# Patient Record
Sex: MALE | Race: WHITE | NOT HISPANIC OR LATINO | Employment: PART TIME | URBAN - METROPOLITAN AREA
[De-identification: names, ages, dates, MRNs, and addresses within clinical notes are randomized per-mention and may not be internally consistent; named-entity substitution may affect disease eponyms.]

---

## 2017-06-22 ENCOUNTER — ALLSCRIPTS OFFICE VISIT (OUTPATIENT)
Dept: OTHER | Facility: OTHER | Age: 67
End: 2017-06-22

## 2017-09-11 ENCOUNTER — TRANSCRIBE ORDERS (OUTPATIENT)
Dept: ADMINISTRATIVE | Facility: HOSPITAL | Age: 67
End: 2017-09-11

## 2017-09-11 DIAGNOSIS — R01.1 HEART MURMUR: Primary | ICD-10-CM

## 2017-09-25 ENCOUNTER — HOSPITAL ENCOUNTER (OUTPATIENT)
Dept: NON INVASIVE DIAGNOSTICS | Facility: CLINIC | Age: 67
Discharge: HOME | End: 2017-09-25
Payer: MEDICARE

## 2017-09-25 ENCOUNTER — GENERIC CONVERSION - ENCOUNTER (OUTPATIENT)
Dept: OTHER | Facility: OTHER | Age: 67
End: 2017-09-25

## 2017-09-25 DIAGNOSIS — R01.1 HEART MURMUR: ICD-10-CM

## 2017-09-25 PROCEDURE — 93306 TTE W/DOPPLER COMPLETE: CPT

## 2017-11-14 ENCOUNTER — ALLSCRIPTS OFFICE VISIT (OUTPATIENT)
Dept: OTHER | Facility: OTHER | Age: 67
End: 2017-11-14

## 2017-11-15 NOTE — CONSULTS
Assessment  1  Elevated PSA (790 93) (R97 20)    Plan  Elevated PSA    · Prostate Needle Biopsy - POC; Status:Active; Requested for:[id number];    Perform: In Office; YCS:[id number]; Ordered;PSA; Ordered By:Luis Eduardo Krueger;    Discussion/Summary  Discussion Summary:   Given the persistence of a PSA elevation of greater than 12 in conjunction with a suspicious area in the right peripheral zone on MRI, I have advised a transrectal biopsies be performed at this time  Risks including bleeding and infection discussed with patient and wife  All questions answered at this time  Counseling Documentation With Imm: The patient was counseled regarding  Self Referrals:   Self Referrals: Yes Thru friends      Chief Complaint  Chief Complaint Free Text Note Form: patient here for elevated PSA      History of Present Illness  HPI: At S patient is a pleasant gentleman seen in regards to a persistent elevated PSA of 12 2  PSA in 2013 was 2 3  Denies any recent systemic illness  No known family history of prostate cancer  He offers no urinary complaints other than enough air flow with otherwise complete bladder emptying sensation  Denies prior prostatitis, UTI, or hematuria  His primary physician had sent him for an MRI of the prostate which also confirms the presence of a suspicious area in the right peripheral zone  Review of Systems  Complete-Male Urology:  Constitutional: No fever or chills, feels well, no tiredness, no recent weight gain or weight loss  Respiratory: No complaints of shortness of breath, no wheezing, no cough, no SOB on exertion, no orthopnea or PND  Cardiovascular: No complaints of slow heart rate, no fast heart rate, no chest pain, no palpitations, no leg claudication, no lower extremity  Gastrointestinal: No complaints of abdominal pain, no constipation, no nausea or vomiting, no diarrhea or bloody stools    Genitourinary: Empty sensation-- and-- stream quality fair, but-- as noted in HPI,-- no dysuria,-- no urinary hesitancy,-- no hematuria,-- no incontinence,-- no nocturia-- and-- no feelings of urinary urgency  Musculoskeletal: No complaints of arthralgia, no myalgias, no joint swelling or stiffness, no limb pain or swelling  Integumentary: No complaints of skin rash or skin lesions, no itching, no skin wound, no dry skin  Hematologic/Lymphatic: No complaints of swollen glands, no swollen glands in the neck, does not bleed easily, no easy bruising  Neurological: No compliants of headache, no confusion, no convulsions, no numbness or tingling, no dizziness or fainting, no limb weakness, no difficulty walking  Active Problems    1  Elevated PSA (790 93) (R97 20)   2  Hypercholesterolemia (272 0) (E78 00)   3  Hypertension (401 9) (I10)   4  Localization-related symptomatic epilepsy and epileptic syndromes with complex partial seizures, not intractable, without status epilepticus (345 40) (G40 209)   5  Osteopenia (733 90) (M85 80)    Past Medical History  1  History of basal cell carcinoma (V10 83) (W87 256)  Active Problems And Past Medical History Reviewed: The active problems and past medical history were reviewed and updated today  Surgical History  1  History of Inguinal Hernia Repair  Surgical History Reviewed: The surgical history was reviewed and updated today  Family History  Father    1  Family history of   Family History Reviewed: The family history was reviewed and updated today  Social History     · Caffeine use (V49 89) (F15 90)   ·    · Never A Smoker   · Social alcohol use (Z78 9)  Social History Reviewed: The social history was reviewed and updated today  Current Meds   1  Aspirin Low Dose 81 MG TABS; TAKE 1 TABLET DAILY; Therapy: 62RUG2086 to (Evaluate:34Num5177); Last Rx:15Kgd5491 Ordered   2  Calcium + D TABS; TAKE 1 TABLET DAILY ( 1200mg); Therapy: (Recorded:93Wki5324) to Recorded   3   Fexofenadine HCl - 180 MG Oral Tablet; TAKE 1 TABLET DAILY; Therapy: (Recorded:11Kbp0761) to Recorded   4  LevETIRAcetam 1000 MG Oral Tablet; TAKE 1 TABLET TWICE DAILY; Therapy: 48JEW2967 to (Evaluate:17Jun2018)  Requested for: 46WPT2383; Last Rx:22Jun2017 Ordered   5  Lisinopril 5 MG Oral Tablet; TAKE 1 TABLET DAILY; Therapy: (Recorded:18Ndw3959) to Recorded   6  Multivitamins Oral Capsule; TAKE 1 CAPSULE DAILY; Therapy: (Recorded:94Rfj9852) to Recorded   7  Simvastatin 40 MG Oral Tablet; TAKE 1 TABLET AT BEDTIME; Therapy: 20TUW2126 to (Evaluate:24Jun2016) Recorded  Medication List Reviewed: The medication list was reviewed and updated today  Allergies  1  No Known Drug Allergies    Vitals  Vital Signs    Recorded: 67UHS1243 08:26AM   Heart Rate 78   Systolic 182   Diastolic 78   Height 6 ft    Weight 191 lb    BMI Calculated 25 9   BSA Calculated 2 09       Physical Exam   Constitutional  General appearance: No acute distress, well appearing and well nourished  Pulmonary  Respiratory effort: No increased work of breathing or signs of respiratory distress  Auscultation of lungs: Clear to auscultation  Cardiovascular  Auscultation of heart: Normal rate and rhythm, normal S1 and S2, without murmurs  Examination of extremities for edema and/or varicosities: Normal    Abdomen  Abdomen: Non-tender, no masses  Genitourinary  Scrotum contents: Normal size, no masses  Epididymis: Normal, no masses  Testes: Normal testes, no masses  Urethral meatus: Normal, no lesions  Penis: Normal, no lesions  Digital rectal exam of prostate: Normal size, no masses  -- Approx 40 gram  No nodule appreciated  Anus, perineum, and rectum: Normal    Musculoskeletal  Gait and station: Normal    Digits and nails: Normal without clubbing or cyanosis  Inspection/palpation of joints, bones, and muscles: Normal    Skin  Skin and subcutaneous tissue: Normal without rashes or lesions     Lymphatic  Palpation of lymph nodes in groin: Normal    Neurologic  Cranial nerves: Cranial nerves 2-12 intact         Signatures   Electronically signed by : ANGI Card ; Nov 14 2017  8:49AM EST                       (Author)

## 2018-01-12 VITALS
BODY MASS INDEX: 25.87 KG/M2 | HEART RATE: 78 BPM | HEIGHT: 72 IN | SYSTOLIC BLOOD PRESSURE: 160 MMHG | WEIGHT: 191 LBS | DIASTOLIC BLOOD PRESSURE: 78 MMHG

## 2018-01-12 VITALS
HEART RATE: 78 BPM | OXYGEN SATURATION: 97 % | HEIGHT: 72 IN | WEIGHT: 191.06 LBS | SYSTOLIC BLOOD PRESSURE: 118 MMHG | BODY MASS INDEX: 25.88 KG/M2 | DIASTOLIC BLOOD PRESSURE: 70 MMHG

## 2018-01-30 ENCOUNTER — TELEPHONE (OUTPATIENT)
Dept: UROLOGY | Facility: AMBULATORY SURGERY CENTER | Age: 68
End: 2018-01-30

## 2018-01-30 PROBLEM — R97.20 ELEVATED PSA: Status: ACTIVE | Noted: 2017-11-14

## 2018-01-30 NOTE — TELEPHONE ENCOUNTER
Patient called office with questions about biopsy prep for his procedure on 2/1/18  Patient states he has already stopped his ASA and vitamins as instructed  He has the antibiotic to start on the morning prior to the biopsy and has the fleet enema to do 2-3 hrs prior to procedure  He asked about eating a decent breakfast since his biopsy is at 12:30  Advised patient he can eat a regular breakfast and all other meds (besides the ASA and vitamins) can be taken on the morning of the biopsy  Patient verbalized understanding and agrees to plan

## 2018-01-30 NOTE — PROGRESS NOTES
Biopsy prostate     Date/Time 1/30/2018 8:30 AM     Performed by  Chaim Watson by YESSY Chavez       Consent: Verbal consent obtained  Written consent obtained  Risks and benefits: risks, benefits and alternatives were discussed  Consent given by: patient  Patient understanding: patient states understanding of the procedure being performed  Patient consent: the patient's understanding of the procedure matches consent given  Procedure consent: procedure consent matches procedure scheduled  Patient identity confirmed: verbally with patient      Local anesthesia used: yes     Anesthesia   Local anesthesia used: yes  Local Anesthetic: lidocaine 1% without epinephrine     Sedation   Patient sedated: no      Patient tolerance: Patient tolerated the procedure well with no immediate complications      Patient Transportation: confirmed         Patient is a pleasant gentleman seen in regards to a persistent elevated PSA of 12 2  PSA in 2013 was 2 3  Denies any recent systemic illness  No known family history of prostate cancer  He offers no urinary complaints other than enough air flow with otherwise complete bladder emptying sensation  Denies prior prostatitis, UTI, or hematuria  His primary physician had sent him for an MRI of the prostate which also confirms the presence of a suspicious area in the right peripheral zone  PROCEDURE- US GUIDED PROSTATE BIOPSY    After identification and obtaining informed consent the patient was placed in the left lateral decubitus position in the ultrasound room  He was prepped in the usual fashion  10 cc of 1% viscous lidocaine was administered per rectum  The 7 5 MHz transrectal probe was then introduced  A periprosthetic nerve block was provided by instilling 10 cc of 1% lidocaine via spinal needle into the periprosthetic space bilaterally  Serial images of the prostate were then obtained   Once completed biopsies were retrieved 4 from each peripheral zone and 2 from each central zone for a total of 12 cores  Patient tolerated this well and will follow up in one to 2 weeks for pathology results  PERTINENT FINDINGS AND PLAN    Prostate volume was 60 5 cc  Scattered calcifications were seen as well as a hypoechoic region within the right peripheral zone  Samples of this region in particular were obtained  Patient tolerated the procedure well  Post biopsy instructions provided  He will return in 2 weeks to review results

## 2018-02-01 ENCOUNTER — PROCEDURE VISIT (OUTPATIENT)
Dept: UROLOGY | Facility: AMBULATORY SURGERY CENTER | Age: 68
End: 2018-02-01
Payer: MEDICARE

## 2018-02-01 VITALS
SYSTOLIC BLOOD PRESSURE: 130 MMHG | BODY MASS INDEX: 25.39 KG/M2 | HEART RATE: 68 BPM | DIASTOLIC BLOOD PRESSURE: 82 MMHG | HEIGHT: 73 IN | WEIGHT: 191.6 LBS

## 2018-02-01 DIAGNOSIS — R97.20 ELEVATED PSA: Primary | ICD-10-CM

## 2018-02-01 PROCEDURE — G0416 PROSTATE BIOPSY, ANY MTHD: HCPCS | Performed by: PATHOLOGY

## 2018-02-01 PROCEDURE — 88344 IMHCHEM/IMCYTCHM EA MLT ANTB: CPT | Performed by: PATHOLOGY

## 2018-02-01 PROCEDURE — G0416 PROSTATE BIOPSY, ANY MTHD: HCPCS | Performed by: UROLOGY

## 2018-02-01 PROCEDURE — 88344 IMHCHEM/IMCYTCHM EA MLT ANTB: CPT | Performed by: UROLOGY

## 2018-02-01 PROCEDURE — 55700 PR BIOPSY OF PROSTATE,NEEDLE/PUNCH: CPT | Performed by: UROLOGY

## 2018-02-01 PROCEDURE — 76872 US TRANSRECTAL: CPT | Performed by: UROLOGY

## 2018-02-01 PROCEDURE — 76942 ECHO GUIDE FOR BIOPSY: CPT | Performed by: UROLOGY

## 2018-02-01 RX ORDER — LANOLIN ALCOHOL/MO/W.PET/CERES
CREAM (GRAM) TOPICAL 2 TIMES DAILY
COMMUNITY

## 2018-02-01 RX ORDER — LISINOPRIL 5 MG/1
1 TABLET ORAL DAILY
COMMUNITY

## 2018-02-01 RX ORDER — CHOLECALCIFEROL (VITAMIN D3) 125 MCG
5 CAPSULE ORAL
COMMUNITY
Start: 2017-03-06

## 2018-02-01 RX ORDER — FEXOFENADINE HCL 180 MG/1
1 TABLET ORAL DAILY
COMMUNITY

## 2018-02-01 RX ORDER — CLOTRIMAZOLE AND BETAMETHASONE DIPROPIONATE 10; .64 MG/G; MG/G
1 CREAM TOPICAL
COMMUNITY
Start: 2017-10-23 | End: 2018-05-16

## 2018-02-01 RX ORDER — SIMVASTATIN 40 MG
1 TABLET ORAL
COMMUNITY
Start: 2016-06-23

## 2018-02-01 RX ORDER — CIPROFLOXACIN 500 MG/1
1 TABLET, FILM COATED ORAL DAILY
COMMUNITY
Start: 2017-12-07 | End: 2018-05-16

## 2018-02-01 RX ORDER — LEVETIRACETAM 1000 MG/1
1 TABLET ORAL 2 TIMES DAILY
COMMUNITY
Start: 2014-12-11

## 2018-02-01 NOTE — PATIENT INSTRUCTIONS
Prostate Biopsy   WHAT YOU NEED TO KNOW:   A prostate biopsy is a procedure to remove samples of tissue from your prostate gland  The prostate is a male sex gland that makes fluid found in semen  It is located just below the bladder  After the samples are removed, they are sent to a lab and tested for cancer  DISCHARGE INSTRUCTIONS:   Seek care immediately if:   · You have heavy bleeding from your rectum  · You urinate very little or not at all  · You have pain from your procedure that gets worse, even after you take pain medicine  Contact your healthcare provider if:   · You have a fever or chills  · You feel pain or burning when you urinate  · Your urine is cloudy or smells bad  · You have questions or concerns about your condition or care  Medicines:  · Medicines  can help decrease pain  You may need medicine to prevent or treat a bacterial infection  Ask how to take pain medicine safely  · Take your medicine as directed  Contact your healthcare provider if you think your medicine is not helping or if you have side effects  Tell him or her if you are allergic to any medicine  Keep a list of the medicines, vitamins, and herbs you take  Include the amounts, and when and why you take them  Bring the list or the pill bottles to follow-up visits  Carry your medicine list with you in case of an emergency  Follow up with your healthcare provider or urologist as directed: You may need to return for more tests or procedures  Write down your questions so you remember to ask them during your visits  © 2017 [employer] Information is for End User's use only and may not be sold, redistributed or otherwise used for commercial purposes  All illustrations and images included in CareNotes® are the copyrighted property of [employer]  or [employer]  The above information is an  only   It is not intended as medical advice for individual conditions or treatments  Talk to your doctor, nurse or pharmacist before following any medical regimen to see if it is safe and effective for you

## 2018-02-09 NOTE — PROGRESS NOTES
2/13/2018    Edmundo Smart  1950  [id number]    Discussion and Plan    Patient is seen for discussion of pathology results revealing adenocarcinoma of the prostate  At this time I spent approximately 45 minutes discussing both a diagnosis of prostate cancer and treatment options  Specific treatments such as active surveillance, brachytherapy, external beam radiation therapy, and radical prostatectomy including open and robotic techniques were reviewed  Risks, benefits, and long-term disease outcomes were explained  Literature was provided and the patient will be again seen in the next one to 2 weeks to finalize treatment planning  He is strongly considering DaVinci prostatectomy  Operative risks and benefits briefly reviewed today  Staging CT and pelvis will be obtained prior to next visit  1  Prostate cancer ([facility] [address] )  - CT abdomen pelvis w wo contrast; Future      Assessment      Patient Active Problem List   Diagnosis    Elevated PSA    Prostate cancer ([facility] [address] )       History of Present Illness    Ingris Dennis is a 79 y o  male seen today in regards to a history of persistently elevated PSA of 12 2  PSA in 2013 was 2 3  Denies any recent systemic illness  No known family history of prostate cancer  He offers no urinary complaints other than enough air flow with otherwise complete bladder emptying sensation  Denies prior prostatitis, UTI, or hematuria  His primary physician had sent him for an MRI of the prostate which also confirms the presence of a suspicious area in the right peripheral zone  He recently underwent prostate biopsies      Urinary Symptom Assessment        Past Medical History  Past Medical History:   Diagnosis Date    Basal cell carcinoma        Past Social History  Past Surgical History:   Procedure Laterality Date    INGUINAL HERNIA REPAIR      PROSTATE BIOPSY  02/01/2018    Dr Wandy Joel        Past Family History  Family History   Problem Relation Age of Onset    No Known Problems Mother  Cancer Brother        Past Social history  Social History     Social History    Marital status: /Civil Union     Spouse name: N/A    Number of children: N/A    Years of education: N/A     Occupational History    Not on file  Social History Main Topics    Smoking status: Never Smoker    Smokeless tobacco: Never Used    Alcohol use Yes      Comment: social     Drug use: No    Sexual activity: Not on file     Other Topics Concern    Not on file     Social History Narrative    No narrative on file       Current Medications  Current Outpatient Prescriptions   Medication Sig Dispense Refill    aspirin (ASPIRIN LOW DOSE) 81 MG tablet Take 1 tablet by mouth daily      calcium citrate-vitamin D (CITRACAL+D) 315-200 MG-UNIT per tablet Take by mouth      clotrimazole-betamethasone (LOTRISONE) 1-0 05 % cream Apply 1 application topically      fexofenadine (ALLEGRA) 180 MG tablet Take 1 tablet by mouth daily      levETIRAcetam (KEPPRA) 1000 MG tablet Take 1 tablet by mouth 2 (two) times a day      lisinopril (ZESTRIL) 5 mg tablet Take 1 tablet by mouth daily      Melatonin 5 MG TABS Take 5 mg by mouth      Pediatric Multivitamins-Fl (MULTIVITAMINS/FL PO) Take 1 capsule by mouth daily      simvastatin (ZOCOR) 40 mg tablet Take 1 tablet by mouth      ciprofloxacin (CIPRO) 500 mg tablet Take 1 tablet by mouth daily       No current facility-administered medications for this visit  Allergies  Allergies   Allergen Reactions    Molds & Smuts      Other reaction(s): Respiratory Distress  Congestion, sore throat, coughing  Past Medical History, Social History, Family History, medications and allergies were reviewed  Review of Systems  Review of Systems   Constitutional: Negative  HENT: Negative  Eyes: Negative  Respiratory: Negative  Cardiovascular: Negative  Gastrointestinal: Negative  Endocrine: Negative      Genitourinary: Negative for difficulty urinating and hematuria  Musculoskeletal: Negative  Skin: Negative  Neurological: Negative  Hematological: Negative  Psychiatric/Behavioral: Negative  Vitals  Vitals:    02/13/18 1038   BP: 128/80   Pulse: 72         Physical Exam    Physical Exam   Constitutional: He is oriented to person, place, and time  He appears well-developed and well-nourished  HENT:   Head: Normocephalic and atraumatic  Eyes: Pupils are equal, round, and reactive to light  Neck: Normal range of motion  Cardiovascular: Normal rate, regular rhythm and normal heart sounds  Pulmonary/Chest: Effort normal and breath sounds normal  No accessory muscle usage  No respiratory distress  Abdominal: Soft  Normal appearance and bowel sounds are normal  There is no tenderness  Genitourinary: Rectum normal and penis normal  No penile tenderness  Genitourinary Comments: Right-sided prostate nodule  Musculoskeletal: Normal range of motion  Neurological: He is alert and oriented to person, place, and time  Skin: Skin is warm, dry and intact  Psychiatric: He has a normal mood and affect  His speech is normal  Cognition and memory are normal    Nursing note and vitals reviewed        Results    No results found for: PSA  Lab Results   Component Value Date    GLUCOSE 96 02/19/2015    CALCIUM 8 7 02/19/2015     02/19/2015    K 4 0 02/19/2015    CO2 29 02/19/2015     02/19/2015    BUN 16 02/19/2015    CREATININE 0 84 02/19/2015     Lab Results   Component Value Date    WBC 3 79 (L) 02/19/2015    HGB 14 6 02/19/2015    HCT 42 7 02/19/2015    MCV 92 02/19/2015     02/19/2015       No results found for this or any previous visit (from the past 1 hour(s)) ]    Case Report   Surgical Pathology Report                         Case: [id number]                                    Authorizing Provider: Lopez Polk MD             Collected:           02/01/2018 1300               Ordering Location:     [address] [place] [address]    Received:            02/01/2018 [facility]                                      [facility]                                                             Pathologist:           Yazmin Foss MD                                                           Specimens:   A) - Prostate, JUAN DANIEL                                                                                   B) - Prostate, LCZ                                                                                   C) - Prostate, RCZ                                                                                   D) - Prostate, RPZ                                                                         Final Diagnosis   A  Prostate, JUAN DANIEL, core needle biopsies:             - Benign prostate glands  - No malignancy is identified, supported on a prostate triple stain, performed on slide A2 with an appropriate control      B  Prostate, LCZ, core needle biopsy:             - Rare atypical prostate glands (less than 5% of the core biopsy), suspicious for prostatic adenocarcinoma, Mni score 3 + 3 = 6, Prognostic Grade I              - Focal loss of staining for basal cell markers (p63 and ) is identified, and positive luminal staining for p504s is noted, (prostate triple stain, performed with an appropriate control)      C  Prostate, RCZ, core needle biopsy:             - Prostatic adenocarcinoma, Denver score 3 + 4 = 7, Prognostic Grade Grade II, involving approximately 30% of 1 of 2 core biopsies              - Less than 10% Min grade 4 prostatic adenocarcinoma             - High-grade PIN              - Loss of staining for basal cell markers (p63 and ) is identified, and positive luminal staining for p504s is noted, (prostate triple stain, performed with an appropriate control)      D   Prostate, RPZ, core needle biopsies:             - Prostatic adenocarcinoma, Denver score 3 + 4 = 7, Prognostic Grade Group II, involving 2 of 4 core biopsies (80%, 75%)  - Approximately 30% Min grade 4 prostatic adenocarcinoma is identified on each core               - Prostatic adenocarcinoma, Hubbard score 3 + 3 = 6, Prognostic Grade Group I, involving less than 5% of 1 of 4 core biopsies              - Loss of staining for basal cell markers (p63 and ) is identified, and positive luminal staining for p504s is noted, (prostate triple stain, performed with an appropriate control)      Note: Unstained slides from block D2 are suggested if additional studies are indicated (e g , Prolaris)      Interpretation performed at , [address]          Electronically signed by Lucas Campbell MD on 2/6/2018 at  2:20 PM

## 2018-02-13 ENCOUNTER — OFFICE VISIT (OUTPATIENT)
Dept: UROLOGY | Facility: CLINIC | Age: 68
End: 2018-02-13
Payer: MEDICARE

## 2018-02-13 VITALS — DIASTOLIC BLOOD PRESSURE: 80 MMHG | HEART RATE: 72 BPM | SYSTOLIC BLOOD PRESSURE: 128 MMHG

## 2018-02-13 DIAGNOSIS — C61 PROSTATE CANCER (HCC): Primary | ICD-10-CM

## 2018-02-13 PROCEDURE — 99215 OFFICE O/P EST HI 40 MIN: CPT | Performed by: UROLOGY

## 2018-02-13 NOTE — PATIENT INSTRUCTIONS
Prostate Cancer   WHAT YOU NEED TO KNOW:   What do I need to know about prostate cancer? The prostate is the male sex gland that helps make semen  It is about the size of a walnut and wraps around the urethra  The urethra is the tube that carries urine from the bladder to the end of the penis  In most cases, prostate cancer is slow growing  What increases my risk for prostate cancer? · Age older than 48 years    · Father or brother with prostate cancer    · Regularly eating fried or high-fat foods    · Eating few fruits and vegetables    · Exposure to high amounts of certain chemicals, such as in cigarette smoke or alkaline batteries    · A sexually transmitted infection (STI)  What are the signs and symptoms of prostate cancer? You may have no symptoms during the early stages  In the later stages, you may have any of the following:  · Trouble starting or stopping the flow of urine    · Feeling the need to urinate often, especially at night    · Pain or a burning feeling when you urinate or ejaculate semen    · Trouble having an erection    · Blood in your urine or semen    · Not being able to urinate at all    · Pain or stiffness in your lower back, hips, or upper thighs  How is prostate cancer diagnosed? · Digital rectal examination (JAX)  is a test to check the size and shape of your prostate  Your healthcare provider will insert a gloved finger into your rectum to feel if your prostate is large, firm, or has lumps  · Prostate-specific antigen (PSA)  is a blood test to check PSA levels  These levels may be increased if you have prostate cancer  · A biopsy  is used to take a sample of your prostate gland to be tested for cancer  The sample may also help healthcare providers determine the stage of your cancer  How is prostate cancer treated? If you have early stage cancer, your healthcare provider may recommend that you have frequent tests and regular follow-up visits to watch for changes   You may also need any of the following:  · Hormone therapy  is medicine used to decrease testosterone (male hormone) levels  · Radiation therapy  is used to kill cancer cells with high-energy x-ray beams  You may receive radiation therapy from outside your body or from small beads or rods placed inside your prostate  · Surgery  may be needed, depending on the stage of the cancer  Part or all of your prostate may be removed  You may also need to have some lymph nodes taken out  This may help keep the cancer from spreading to other parts of your body  What can I do to manage my prostate cancer? · Do not smoke  Nicotine can damage blood vessels and make it more difficult to manage your prostate cancer  Smoking also increases your risk for new or returning cancer and delays healing after treatment  Do not use e-cigarettes or smokeless tobacco in place of cigarettes or to help you quit  They still contain nicotine  Ask your healthcare provider for information if you currently smoke and need help quitting  · Limit or do not drink alcohol as directed  Limit alcohol to 2 drinks per day  A drink is 12 ounces of beer, 1½ ounces of liquor, or 5 ounces of wine  · Eat a variety of healthy foods  Healthy foods include fruits, vegetables, whole-grain breads, low-fat dairy products, beans, lean meats, and fish  Your healthcare provider may also recommend changes to the amounts of calcium and vitamin D you have each day  · Manage your weight  Obesity may increase your risk for problems from prostate cancer  Limit or do not have high-calorie foods or drinks  · Exercise as directed  Exercise may help you recover after treatment and may help prevent your prostate cancer from returning  Exercise can also help you manage your weight  Try to get at least 30 minutes of exercise 5 days a week, such as walking  · Ask about sexual activity    Ask your healthcare provider when it is safe for you to start having sex after your treatment  Medicines may be given if you have trouble getting or maintaining an erection  · Manage incontinence  You may have incontinence (trouble controlling when you urinate) after treatment  Ask your healthcare provider for information on managing urinary incontinence  You may be able to gain control over your urination with techniques or medicines  · Drink liquids as directed  Ask how much liquid to drink each day and which liquids are best for you  Drink extra liquids to prevent dehydration  You will also need to replace fluid if you are vomiting or have diarrhea from cancer treatments  Call 911 for any of the following:   · Your leg feels warm, tender, and painful  It may look swollen and red  · You suddenly feel lightheaded and short of breath  · You have chest pain when you take a deep breath or cough  · You cough up blood  When should I contact my healthcare provider? · You have a fever  · You feel you cannot cope with your illness  · You have blood in your urine or have trouble urinating  · You have pain that does not get better after you take your medicine  · You have questions or concerns about your condition or care  CARE AGREEMENT:   You have the right to help plan your care  Learn about your health condition and how it may be treated  Discuss treatment options with your caregivers to decide what care you want to receive  You always have the right to refuse treatment  The above information is an  only  It is not intended as medical advice for individual conditions or treatments  Talk to your doctor, nurse or pharmacist before following any medical regimen to see if it is safe and effective for you  © 2017 [address] [employer] Information is for End User's use only and may not be sold, redistributed or otherwise used for commercial purposes   All illustrations and images included in CareNotes® are the copyrighted property of Justin OBREGON  or [employer]

## 2018-02-21 ENCOUNTER — HOSPITAL ENCOUNTER (OUTPATIENT)
Dept: RADIOLOGY | Age: 68
Discharge: HOME | End: 2018-02-21
Payer: MEDICARE

## 2018-02-21 DIAGNOSIS — C61 PROSTATE CANCER (HCC): ICD-10-CM

## 2018-02-21 PROCEDURE — 74178 CT ABD&PLV WO CNTR FLWD CNTR: CPT

## 2018-02-21 RX ADMIN — IOHEXOL 100 ML: 350 INJECTION, SOLUTION INTRAVENOUS at 13:27

## 2018-03-01 PROBLEM — R97.20 ELEVATED PSA: Status: RESOLVED | Noted: 2017-11-14 | Resolved: 2018-03-01

## 2018-03-06 ENCOUNTER — OFFICE VISIT (OUTPATIENT)
Dept: UROLOGY | Facility: CLINIC | Age: 68
End: 2018-03-06
Payer: MEDICARE

## 2018-03-06 VITALS
DIASTOLIC BLOOD PRESSURE: 86 MMHG | HEART RATE: 62 BPM | SYSTOLIC BLOOD PRESSURE: 122 MMHG | WEIGHT: 190.2 LBS | BODY MASS INDEX: 25.21 KG/M2 | HEIGHT: 73 IN

## 2018-03-06 DIAGNOSIS — C61 PROSTATE CANCER (HCC): Primary | ICD-10-CM

## 2018-03-06 PROCEDURE — 99215 OFFICE O/P EST HI 40 MIN: CPT | Performed by: UROLOGY

## 2018-05-15 ENCOUNTER — TELEPHONE (OUTPATIENT)
Dept: PREADMISSION TESTING | Facility: HOSPITAL | Age: 68
End: 2018-05-15

## 2018-05-16 ENCOUNTER — OFFICE VISIT (OUTPATIENT)
Dept: LAB | Facility: HOSPITAL | Age: 68
End: 2018-05-16
Attending: UROLOGY
Payer: MEDICARE

## 2018-05-16 ENCOUNTER — APPOINTMENT (OUTPATIENT)
Dept: LAB | Facility: HOSPITAL | Age: 68
End: 2018-05-16
Attending: UROLOGY
Payer: MEDICARE

## 2018-05-16 ENCOUNTER — TRANSCRIBE ORDERS (OUTPATIENT)
Dept: LAB | Facility: HOSPITAL | Age: 68
End: 2018-05-16

## 2018-05-16 DIAGNOSIS — C61 PROSTATE CANCER (HCC): ICD-10-CM

## 2018-05-16 LAB
ABO GROUP BLD: NORMAL
ANION GAP SERPL CALCULATED.3IONS-SCNC: 6 MMOL/L (ref 4–13)
APTT PPP: 35 SECONDS (ref 24–36)
BASOPHILS # BLD AUTO: 0.01 THOUSANDS/ΜL (ref 0–0.1)
BASOPHILS NFR BLD AUTO: 0 % (ref 0–1)
BLD GP AB SCN SERPL QL: NEGATIVE
BUN SERPL-MCNC: 22 MG/DL (ref 5–25)
CALCIUM SERPL-MCNC: 9 MG/DL (ref 8.3–10.1)
CHLORIDE SERPL-SCNC: 105 MMOL/L (ref 100–108)
CO2 SERPL-SCNC: 28 MMOL/L (ref 21–32)
CREAT SERPL-MCNC: 1.18 MG/DL (ref 0.6–1.3)
EOSINOPHIL # BLD AUTO: 0.14 THOUSAND/ΜL (ref 0–0.61)
EOSINOPHIL NFR BLD AUTO: 3 % (ref 0–6)
ERYTHROCYTE [DISTWIDTH] IN BLOOD BY AUTOMATED COUNT: 13 % (ref 11.6–15.1)
GFR SERPL CREATININE-BSD FRML MDRD: 63 ML/MIN/1.73SQ M
GLUCOSE SERPL-MCNC: 84 MG/DL (ref 65–140)
HCT VFR BLD AUTO: 43.8 % (ref 36.5–49.3)
HGB BLD-MCNC: 15.1 G/DL (ref 12–17)
IMM GRANULOCYTES # BLD AUTO: 0 THOUSAND/UL (ref 0–0.2)
IMM GRANULOCYTES NFR BLD AUTO: 0 % (ref 0–2)
INR PPP: 1.07 (ref 0.86–1.16)
LYMPHOCYTES # BLD AUTO: 1.07 THOUSANDS/ΜL (ref 0.6–4.47)
LYMPHOCYTES NFR BLD AUTO: 26 % (ref 14–44)
MCH RBC QN AUTO: 31.9 PG (ref 26.8–34.3)
MCHC RBC AUTO-ENTMCNC: 34.5 G/DL (ref 31.4–37.4)
MCV RBC AUTO: 92 FL (ref 82–98)
MONOCYTES # BLD AUTO: 0.34 THOUSAND/ΜL (ref 0.17–1.22)
MONOCYTES NFR BLD AUTO: 8 % (ref 4–12)
NEUTROPHILS # BLD AUTO: 2.62 THOUSANDS/ΜL (ref 1.85–7.62)
NEUTS SEG NFR BLD AUTO: 63 % (ref 43–75)
NRBC BLD AUTO-RTO: 0 /100 WBCS
PLATELET # BLD AUTO: 133 THOUSANDS/UL (ref 149–390)
PMV BLD AUTO: 9.1 FL (ref 8.9–12.7)
POTASSIUM SERPL-SCNC: 4.5 MMOL/L (ref 3.5–5.3)
PROTHROMBIN TIME: 13.9 SECONDS (ref 11.8–14.2)
RBC # BLD AUTO: 4.74 MILLION/UL (ref 3.88–5.62)
RH BLD: POSITIVE
SODIUM SERPL-SCNC: 139 MMOL/L (ref 136–145)
SPECIMEN EXPIRATION DATE: NORMAL
WBC # BLD AUTO: 4.18 THOUSAND/UL (ref 4.31–10.16)

## 2018-05-16 PROCEDURE — 93005 ELECTROCARDIOGRAM TRACING: CPT

## 2018-05-16 PROCEDURE — 86850 RBC ANTIBODY SCREEN: CPT

## 2018-05-16 PROCEDURE — 86900 BLOOD TYPING SEROLOGIC ABO: CPT

## 2018-05-16 PROCEDURE — 86901 BLOOD TYPING SEROLOGIC RH(D): CPT

## 2018-05-16 PROCEDURE — 85730 THROMBOPLASTIN TIME PARTIAL: CPT

## 2018-05-16 PROCEDURE — 80048 BASIC METABOLIC PNL TOTAL CA: CPT

## 2018-05-16 PROCEDURE — 85610 PROTHROMBIN TIME: CPT

## 2018-05-16 PROCEDURE — 85025 COMPLETE CBC W/AUTO DIFF WBC: CPT

## 2018-05-16 PROCEDURE — 36415 COLL VENOUS BLD VENIPUNCTURE: CPT

## 2018-05-16 NOTE — PRE-PROCEDURE INSTRUCTIONS
Pre-Surgery Instructions:   Medication Instructions    aspirin (ASPIRIN LOW DOSE) 81 MG tablet Instructed patient per Anesthesia Guidelines   calcium citrate-vitamin D (CITRACAL+D) 315-200 MG-UNIT per tablet Instructed patient per Anesthesia Guidelines   fexofenadine (ALLEGRA) 180 MG tablet Instructed patient per Anesthesia Guidelines   levETIRAcetam (KEPPRA) 1000 MG tablet Instructed patient per Anesthesia Guidelines   lisinopril (ZESTRIL) 5 mg tablet Instructed patient per Anesthesia Guidelines   Melatonin 5 MG TABS Instructed patient per Anesthesia Guidelines   Pediatric Multivitamins-Fl (MULTIVITAMINS/FL PO) Instructed patient per Anesthesia Guidelines   simvastatin (ZOCOR) 40 mg tablet Instructed patient per Anesthesia Guidelines

## 2018-05-17 LAB
ATRIAL RATE: 52 BPM
P AXIS: 50 DEGREES
PR INTERVAL: 148 MS
QRS AXIS: 76 DEGREES
QRSD INTERVAL: 94 MS
QT INTERVAL: 400 MS
QTC INTERVAL: 372 MS
T WAVE AXIS: 55 DEGREES
VENTRICULAR RATE: 52 BPM

## 2018-05-17 PROCEDURE — 93010 ELECTROCARDIOGRAM REPORT: CPT | Performed by: INTERNAL MEDICINE

## 2018-05-22 ENCOUNTER — ANESTHESIA EVENT (OUTPATIENT)
Dept: PERIOP | Facility: HOSPITAL | Age: 68
End: 2018-05-22
Payer: MEDICARE

## 2018-05-23 ENCOUNTER — ANESTHESIA (OUTPATIENT)
Dept: PERIOP | Facility: HOSPITAL | Age: 68
End: 2018-05-23
Payer: MEDICARE

## 2018-05-23 ENCOUNTER — HOSPITAL ENCOUNTER (OUTPATIENT)
Facility: HOSPITAL | Age: 68
Discharge: HOME | End: 2018-05-24
Attending: UROLOGY | Admitting: UROLOGY
Payer: MEDICARE

## 2018-05-23 DIAGNOSIS — C61 PROSTATE CANCER (HCC): ICD-10-CM

## 2018-05-23 LAB
ALBUMIN SERPL BCP-MCNC: 3.3 G/DL (ref 3.5–5)
ALP SERPL-CCNC: 47 U/L (ref 46–116)
ALT SERPL W P-5'-P-CCNC: 26 U/L (ref 12–78)
ANION GAP SERPL CALCULATED.3IONS-SCNC: 8 MMOL/L (ref 4–13)
AST SERPL W P-5'-P-CCNC: 18 U/L (ref 5–45)
BILIRUB SERPL-MCNC: 0.55 MG/DL (ref 0.2–1)
BUN SERPL-MCNC: 13 MG/DL (ref 5–25)
CALCIUM SERPL-MCNC: 8.3 MG/DL (ref 8.3–10.1)
CHLORIDE SERPL-SCNC: 110 MMOL/L (ref 100–108)
CO2 SERPL-SCNC: 24 MMOL/L (ref 21–32)
CREAT SERPL-MCNC: 1.09 MG/DL (ref 0.6–1.3)
ERYTHROCYTE [DISTWIDTH] IN BLOOD BY AUTOMATED COUNT: 12.8 % (ref 11.6–15.1)
GFR SERPL CREATININE-BSD FRML MDRD: 69 ML/MIN/1.73SQ M
GLUCOSE P FAST SERPL-MCNC: 146 MG/DL (ref 65–99)
GLUCOSE SERPL-MCNC: 146 MG/DL (ref 65–140)
HCT VFR BLD AUTO: 39.1 % (ref 36.5–49.3)
HGB BLD-MCNC: 13.6 G/DL (ref 12–17)
MCH RBC QN AUTO: 31.9 PG (ref 26.8–34.3)
MCHC RBC AUTO-ENTMCNC: 34.8 G/DL (ref 31.4–37.4)
MCV RBC AUTO: 92 FL (ref 82–98)
PLATELET # BLD AUTO: 101 THOUSANDS/UL (ref 149–390)
PMV BLD AUTO: 9.1 FL (ref 8.9–12.7)
POTASSIUM SERPL-SCNC: 3.9 MMOL/L (ref 3.5–5.3)
PROT SERPL-MCNC: 6 G/DL (ref 6.4–8.2)
RBC # BLD AUTO: 4.27 MILLION/UL (ref 3.88–5.62)
SODIUM SERPL-SCNC: 142 MMOL/L (ref 136–145)
WBC # BLD AUTO: 5.91 THOUSAND/UL (ref 4.31–10.16)

## 2018-05-23 PROCEDURE — 80053 COMPREHEN METABOLIC PANEL: CPT | Performed by: UROLOGY

## 2018-05-23 PROCEDURE — 55866 LAPS SURG PRST8ECT RPBIC RAD: CPT | Performed by: PHYSICIAN ASSISTANT

## 2018-05-23 PROCEDURE — 55866 LAPS SURG PRST8ECT RPBIC RAD: CPT | Performed by: UROLOGY

## 2018-05-23 PROCEDURE — 88309 TISSUE EXAM BY PATHOLOGIST: CPT | Performed by: PATHOLOGY

## 2018-05-23 PROCEDURE — 88341 IMHCHEM/IMCYTCHM EA ADD ANTB: CPT | Performed by: PATHOLOGY

## 2018-05-23 PROCEDURE — 88342 IMHCHEM/IMCYTCHM 1ST ANTB: CPT | Performed by: PATHOLOGY

## 2018-05-23 PROCEDURE — 85027 COMPLETE CBC AUTOMATED: CPT | Performed by: UROLOGY

## 2018-05-23 PROCEDURE — 86920 COMPATIBILITY TEST SPIN: CPT

## 2018-05-23 RX ORDER — SODIUM CHLORIDE, SODIUM LACTATE, POTASSIUM CHLORIDE, CALCIUM CHLORIDE 600; 310; 30; 20 MG/100ML; MG/100ML; MG/100ML; MG/100ML
INJECTION, SOLUTION INTRAVENOUS CONTINUOUS PRN
Status: DISCONTINUED | OUTPATIENT
Start: 2018-05-23 | End: 2018-05-23 | Stop reason: SURG

## 2018-05-23 RX ORDER — ROCURONIUM BROMIDE 10 MG/ML
INJECTION, SOLUTION INTRAVENOUS AS NEEDED
Status: DISCONTINUED | OUTPATIENT
Start: 2018-05-23 | End: 2018-05-23 | Stop reason: SURG

## 2018-05-23 RX ORDER — METOCLOPRAMIDE HYDROCHLORIDE 5 MG/ML
10 INJECTION INTRAMUSCULAR; INTRAVENOUS ONCE AS NEEDED
Status: DISCONTINUED | OUTPATIENT
Start: 2018-05-23 | End: 2018-05-23 | Stop reason: HOSPADM

## 2018-05-23 RX ORDER — ONDANSETRON 2 MG/ML
4 INJECTION INTRAMUSCULAR; INTRAVENOUS ONCE AS NEEDED
Status: DISCONTINUED | OUTPATIENT
Start: 2018-05-23 | End: 2018-05-23 | Stop reason: HOSPADM

## 2018-05-23 RX ORDER — ACETAMINOPHEN 325 MG/1
650 TABLET ORAL EVERY 4 HOURS PRN
Status: DISCONTINUED | OUTPATIENT
Start: 2018-05-23 | End: 2018-05-24 | Stop reason: HOSPADM

## 2018-05-23 RX ORDER — FENTANYL CITRATE/PF 50 MCG/ML
50 SYRINGE (ML) INJECTION
Status: DISCONTINUED | OUTPATIENT
Start: 2018-05-23 | End: 2018-05-23 | Stop reason: HOSPADM

## 2018-05-23 RX ORDER — ONDANSETRON 2 MG/ML
4 INJECTION INTRAMUSCULAR; INTRAVENOUS EVERY 4 HOURS PRN
Status: DISCONTINUED | OUTPATIENT
Start: 2018-05-23 | End: 2018-05-24 | Stop reason: HOSPADM

## 2018-05-23 RX ORDER — AMOXICILLIN 250 MG
2 CAPSULE ORAL DAILY
Status: DISCONTINUED | OUTPATIENT
Start: 2018-05-23 | End: 2018-05-24 | Stop reason: HOSPADM

## 2018-05-23 RX ORDER — EPHEDRINE SULFATE 50 MG/ML
INJECTION, SOLUTION INTRAVENOUS AS NEEDED
Status: DISCONTINUED | OUTPATIENT
Start: 2018-05-23 | End: 2018-05-23 | Stop reason: SURG

## 2018-05-23 RX ORDER — DEXTROSE, SODIUM CHLORIDE, SODIUM LACTATE, POTASSIUM CHLORIDE, AND CALCIUM CHLORIDE 5; .6; .31; .03; .02 G/100ML; G/100ML; G/100ML; G/100ML; G/100ML
125 INJECTION, SOLUTION INTRAVENOUS CONTINUOUS
Status: DISCONTINUED | OUTPATIENT
Start: 2018-05-23 | End: 2018-05-24

## 2018-05-23 RX ORDER — PRAVASTATIN SODIUM 40 MG
40 TABLET ORAL
Status: DISCONTINUED | OUTPATIENT
Start: 2018-05-23 | End: 2018-05-24 | Stop reason: HOSPADM

## 2018-05-23 RX ORDER — MAGNESIUM HYDROXIDE 1200 MG/15ML
LIQUID ORAL AS NEEDED
Status: DISCONTINUED | OUTPATIENT
Start: 2018-05-23 | End: 2018-05-23 | Stop reason: HOSPADM

## 2018-05-23 RX ORDER — HYDROCODONE BITARTRATE AND ACETAMINOPHEN 5; 325 MG/1; MG/1
1 TABLET ORAL EVERY 4 HOURS PRN
Status: DISCONTINUED | OUTPATIENT
Start: 2018-05-23 | End: 2018-05-24 | Stop reason: HOSPADM

## 2018-05-23 RX ORDER — MORPHINE SULFATE 2 MG/ML
2 INJECTION, SOLUTION INTRAMUSCULAR; INTRAVENOUS
Status: DISCONTINUED | OUTPATIENT
Start: 2018-05-23 | End: 2018-05-24 | Stop reason: HOSPADM

## 2018-05-23 RX ORDER — FENTANYL CITRATE 50 UG/ML
INJECTION, SOLUTION INTRAMUSCULAR; INTRAVENOUS AS NEEDED
Status: DISCONTINUED | OUTPATIENT
Start: 2018-05-23 | End: 2018-05-23 | Stop reason: SURG

## 2018-05-23 RX ORDER — BACITRACIN, NEOMYCIN, POLYMYXIN B 400; 3.5; 5 [USP'U]/G; MG/G; [USP'U]/G
1 OINTMENT TOPICAL 2 TIMES DAILY
Status: DISCONTINUED | OUTPATIENT
Start: 2018-05-23 | End: 2018-05-24 | Stop reason: HOSPADM

## 2018-05-23 RX ORDER — LEVETIRACETAM 500 MG/1
1000 TABLET ORAL 2 TIMES DAILY
Status: DISCONTINUED | OUTPATIENT
Start: 2018-05-23 | End: 2018-05-24 | Stop reason: HOSPADM

## 2018-05-23 RX ORDER — LISINOPRIL 5 MG/1
5 TABLET ORAL DAILY
Status: DISCONTINUED | OUTPATIENT
Start: 2018-05-23 | End: 2018-05-24 | Stop reason: HOSPADM

## 2018-05-23 RX ORDER — ONDANSETRON 2 MG/ML
INJECTION INTRAMUSCULAR; INTRAVENOUS AS NEEDED
Status: DISCONTINUED | OUTPATIENT
Start: 2018-05-23 | End: 2018-05-23 | Stop reason: SURG

## 2018-05-23 RX ORDER — LORATADINE 10 MG/1
10 TABLET ORAL DAILY
Status: DISCONTINUED | OUTPATIENT
Start: 2018-05-23 | End: 2018-05-24 | Stop reason: HOSPADM

## 2018-05-23 RX ORDER — SODIUM CHLORIDE 9 MG/ML
INJECTION, SOLUTION INTRAVENOUS CONTINUOUS PRN
Status: DISCONTINUED | OUTPATIENT
Start: 2018-05-23 | End: 2018-05-23 | Stop reason: SURG

## 2018-05-23 RX ORDER — MEPERIDINE HYDROCHLORIDE 25 MG/ML
12.5 INJECTION INTRAMUSCULAR; INTRAVENOUS; SUBCUTANEOUS
Status: DISCONTINUED | OUTPATIENT
Start: 2018-05-23 | End: 2018-05-23 | Stop reason: HOSPADM

## 2018-05-23 RX ORDER — LIDOCAINE HYDROCHLORIDE 10 MG/ML
INJECTION, SOLUTION INFILTRATION; PERINEURAL AS NEEDED
Status: DISCONTINUED | OUTPATIENT
Start: 2018-05-23 | End: 2018-05-23 | Stop reason: SURG

## 2018-05-23 RX ORDER — SODIUM CHLORIDE, SODIUM LACTATE, POTASSIUM CHLORIDE, CALCIUM CHLORIDE 600; 310; 30; 20 MG/100ML; MG/100ML; MG/100ML; MG/100ML
20 INJECTION, SOLUTION INTRAVENOUS CONTINUOUS
Status: DISCONTINUED | OUTPATIENT
Start: 2018-05-23 | End: 2018-05-24

## 2018-05-23 RX ORDER — PROPOFOL 10 MG/ML
INJECTION, EMULSION INTRAVENOUS CONTINUOUS PRN
Status: DISCONTINUED | OUTPATIENT
Start: 2018-05-23 | End: 2018-05-23 | Stop reason: SURG

## 2018-05-23 RX ADMIN — Medication 500 MG: at 08:41

## 2018-05-23 RX ADMIN — ROCURONIUM BROMIDE 20 MG: 10 INJECTION INTRAVENOUS at 09:12

## 2018-05-23 RX ADMIN — BACITRACIN, NEOMYCIN, POLYMYXIN B 1 SMALL APPLICATION: 400; 3.5; 5 OINTMENT TOPICAL at 13:20

## 2018-05-23 RX ADMIN — ONDANSETRON 4 MG: 2 INJECTION INTRAMUSCULAR; INTRAVENOUS at 10:54

## 2018-05-23 RX ADMIN — Medication 2 TABLET: at 13:20

## 2018-05-23 RX ADMIN — SODIUM CHLORIDE, SODIUM LACTATE, POTASSIUM CHLORIDE, AND CALCIUM CHLORIDE: .6; .31; .03; .02 INJECTION, SOLUTION INTRAVENOUS at 08:08

## 2018-05-23 RX ADMIN — SUGAMMADEX 345 MG: 100 INJECTION, SOLUTION INTRAVENOUS at 11:19

## 2018-05-23 RX ADMIN — DEXAMETHASONE SODIUM PHOSPHATE 10 MG: 10 INJECTION INTRAMUSCULAR; INTRAVENOUS at 08:40

## 2018-05-23 RX ADMIN — PROPOFOL 120 MCG/KG/MIN: 10 INJECTION, EMULSION INTRAVENOUS at 08:42

## 2018-05-23 RX ADMIN — ROCURONIUM BROMIDE 20 MG: 10 INJECTION INTRAVENOUS at 10:00

## 2018-05-23 RX ADMIN — HYDROMORPHONE HYDROCHLORIDE 0.5 MG: 1 INJECTION, SOLUTION INTRAMUSCULAR; INTRAVENOUS; SUBCUTANEOUS at 09:19

## 2018-05-23 RX ADMIN — ROCURONIUM BROMIDE 20 MG: 10 INJECTION INTRAVENOUS at 09:34

## 2018-05-23 RX ADMIN — LIDOCAINE HYDROCHLORIDE 50 MG: 10 INJECTION, SOLUTION INFILTRATION; PERINEURAL at 08:37

## 2018-05-23 RX ADMIN — ROCURONIUM BROMIDE 50 MG: 10 INJECTION INTRAVENOUS at 08:37

## 2018-05-23 RX ADMIN — FENTANYL CITRATE 100 MCG: 50 INJECTION, SOLUTION INTRAMUSCULAR; INTRAVENOUS at 08:37

## 2018-05-23 RX ADMIN — DEXTROSE, SODIUM CHLORIDE, SODIUM LACTATE, POTASSIUM CHLORIDE, AND CALCIUM CHLORIDE 125 ML/HR: 5; .6; .31; .03; .02 INJECTION, SOLUTION INTRAVENOUS at 12:05

## 2018-05-23 RX ADMIN — ROCURONIUM BROMIDE 20 MG: 10 INJECTION INTRAVENOUS at 09:39

## 2018-05-23 RX ADMIN — CEFAZOLIN SODIUM 1000 MG: 1 SOLUTION INTRAVENOUS at 16:42

## 2018-05-23 RX ADMIN — EPHEDRINE SULFATE 10 MG: 50 INJECTION, SOLUTION INTRAMUSCULAR; INTRAVENOUS; SUBCUTANEOUS at 09:08

## 2018-05-23 RX ADMIN — ROCURONIUM BROMIDE 10 MG: 10 INJECTION INTRAVENOUS at 08:44

## 2018-05-23 RX ADMIN — DEXTROSE, SODIUM CHLORIDE, SODIUM LACTATE, POTASSIUM CHLORIDE, AND CALCIUM CHLORIDE 125 ML/HR: 5; .6; .31; .03; .02 INJECTION, SOLUTION INTRAVENOUS at 21:46

## 2018-05-23 RX ADMIN — ACETAMINOPHEN 650 MG: 325 TABLET, FILM COATED ORAL at 14:57

## 2018-05-23 RX ADMIN — SODIUM CHLORIDE: 0.9 INJECTION, SOLUTION INTRAVENOUS at 08:41

## 2018-05-23 RX ADMIN — METRONIDAZOLE 500 MG: 500 INJECTION, SOLUTION INTRAVENOUS at 16:11

## 2018-05-23 RX ADMIN — FENTANYL CITRATE 50 MCG: 50 INJECTION, SOLUTION INTRAMUSCULAR; INTRAVENOUS at 11:23

## 2018-05-23 RX ADMIN — PRAVASTATIN SODIUM 40 MG: 40 TABLET ORAL at 16:42

## 2018-05-23 RX ADMIN — LEVETIRACETAM 1000 MG: 500 TABLET ORAL at 18:24

## 2018-05-23 RX ADMIN — LISINOPRIL 5 MG: 5 TABLET ORAL at 13:20

## 2018-05-23 RX ADMIN — BACITRACIN, NEOMYCIN, POLYMYXIN B 1 SMALL APPLICATION: 400; 3.5; 5 OINTMENT TOPICAL at 18:24

## 2018-05-23 NOTE — RESPIRATORY THERAPY NOTE
RT Protocol Note  Layla Bradley 76 y o  male MRN: [id number]  Unit/Bed#: [facility] 833-01 Encounter: [id number]    Assessment    Principal Problem:    Prostate cancer ([address] )      Home Pulmonary Medications:  n/a       Past Medical History:   Diagnosis Date    Basal cell carcinoma     Hyperlipidemia     Hypertension     PONV (postoperative nausea and vomiting)     Seizures (HCC)     simple complex partial seizure--last seizure in 2002      Social History     Social History    Marital status: /Civil Union     Spouse name: N/A    Number of children: N/A    Years of education: N/A     Social History Main Topics    Smoking status: Never Smoker    Smokeless tobacco: Never Used    Alcohol use No    Drug use: No    Sexual activity: Not Asked     Other Topics Concern    None     Social History Narrative    None       Subjective         Objective    Physical Exam:   Assessment Type: (P) During-treatment  General Appearance: (P) Alert, Awake  Respiratory Pattern: (P) Normal  Chest Assessment: (P) Chest expansion symmetrical  Bilateral Breath Sounds: (P) Clear  O2 Device: (P) R/A    Vitals:  Blood pressure 121/72, pulse 67, temperature 98 °F (36 7 °C), temperature source Oral, resp  rate 18, height 6' 1" (1 854 m), weight 86 2 kg (190 lb), SpO2 97 %  Imaging and other studies: I have personally reviewed pertinent reports        O2 Device: (P) R/A     Plan             Resp Comments: (P) pt ordered I/S, pt achieved goal, pt did 1500 on i/s, pt takes no pulmonary meds at home and has no pulmonary hx, pt in no respiratory distress, protocol d/c'd

## 2018-05-23 NOTE — ANESTHESIA PREPROCEDURE EVALUATION
Review of Systems/Medical History  Patient summary reviewed    History of anesthetic complications PONV    Cardiovascular  EKG reviewed, Hyperlipidemia, Hypertension ,   Comment: LEFT VENTRICLE:  Size was normal   Systolic function was normal  Ejection fraction was estimated to be 65 %  Left ventricular diastolic function parameters were normal      RIGHT VENTRICLE:  The size was normal   Systolic function was normal      LEFT ATRIUM:  The atrium was mildly dilated      RIGHT ATRIUM:  The atrium was mildly dilated      MITRAL VALVE:  There was mild to moderate regurgitation      TRICUSPID VALVE:  There was mild regurgitation  Pulmonary artery systolic pressure was within the normal range    ,  Pulmonary  Negative pulmonary ROS        GI/Hepatic  Negative GI/hepatic ROS          Negative  ROS Prostatic disorder, history of prostate cancer       Endo/Other  Negative endo/other ROS      GYN  Negative gynecology ROS          Hematology  Negative hematology ROS      Musculoskeletal  Negative musculoskeletal ROS        Neurology  Seizures well controlled,     Psychology   Negative psychology ROS              Physical Exam    Airway    Mallampati score: II  TM Distance: >3 FB  Neck ROM: full     Dental       Cardiovascular  Cardiovascular exam normal    Pulmonary  Pulmonary exam normal     Other Findings        Anesthesia Plan  ASA Score- 2     Anesthesia Type- general with ASA Monitors  Additional Monitors: arterial line  Airway Plan: ETT  Plan Factors-    Induction- intravenous  Postoperative Plan-     Informed Consent- Anesthetic plan and risks discussed with patient  I personally reviewed this patient with the CRNA  Discussed and agreed on the Anesthesia Plan with the CRNA  Venecia Taylor

## 2018-05-23 NOTE — PLAN OF CARE
DISCHARGE PLANNING     Discharge to home or other facility with appropriate resources Progressing        GENITOURINARY - ADULT     Maintains or returns to baseline urinary function Progressing     Absence of urinary retention Progressing     Urinary catheter remains patent Progressing        INFECTION - ADULT     Absence or prevention of progression during hospitalization Progressing     Absence of fever/infection during neutropenic period Progressing        Knowledge Deficit     Patient/family/caregiver demonstrates understanding of disease process, treatment plan, medications, and discharge instructions Progressing        Nutrition/Hydration-ADULT     Nutrient/Hydration intake appropriate for improving, restoring or maintaining nutritional needs Progressing        PAIN - ADULT     Verbalizes/displays adequate comfort level or baseline comfort level Progressing        SAFETY ADULT     Patient will remain free of falls Progressing     Maintain or return to baseline ADL function Progressing     Maintain or return mobility status to optimal level Progressing

## 2018-05-23 NOTE — ANESTHESIA POSTPROCEDURE EVALUATION
Post-Op Assessment Note      CV Status:  Stable    Mental Status:  Alert and awake    Hydration Status:  Euvolemic    PONV Controlled:  Controlled    Airway Patency:  Patent    Post Op Vitals Reviewed: Yes          Staff: CRNA           BP   135/72   Temp 98 °F (36 7 °C) (05/23/18 1136)    Pulse 82 (05/23/18 1136)   Resp (P) 20 (05/23/18 1136)    SpO2 (P) 100 % (05/23/18 1136)

## 2018-05-23 NOTE — OP NOTE
OPERATIVE REPORT  PATIENT NAME: Harinder Bhakta    :  1950  MRN: [id number]  Pt Location: [facility] OR ROOM 14    SURGERY DATE: 2018    Surgeon(s) and Role:     * Trevon Stern MD - Primary     * Brenda Dominguez PA-C - Assisting    Preop Diagnosis:  Prostate cancer ([facility] ) Enrandez Asper    Post-Op Diagnosis Codes:     * Prostate cancer ([facility] [address] ) Ernandez Asper    Procedure(s) (LRB):  PROSTATECTOMY RADICAL W ROBOTICS (N/A)    Specimen(s):  ID Type Source Tests Collected by Time Destination   1 :  Tissue Prostate TISSUE EXAM Trevon Stern MD 2018 1102        Estimated Blood Loss:   300 mL    Drains:  Closed/Suction Drain Left LLQ Bulb 10 Fr  (Active)   Site Description Unable to view 2018 11:36 AM   Dressing Status Clean;Dry; Intact 2018 11:36 AM   Drainage Appearance Serosanguineous 2018 11:36 AM   Status To bulb suction 2018 11:36 AM   Number of days: 0       Urethral Catheter Latex 20 Fr  (Active)   Site Assessment Clean;Skin intact; Patent 2018 11:36 AM   Collection Container Standard drainage bag 2018 11:36 AM   Securement Method Securing device (Describe) 2018 11:36 AM   Number of days: 0       [REMOVED] Urethral Catheter Latex 18 Fr  (Removed)   Number of days: 0       Anesthesia Type:   General    Operative Indications:  Prostate cancer ([facility] [address] ) [C61]      Operative Findings:  none    Complications:   None    Procedure and Technique:    After in formed consent including risks of bleeding, infection, bowel/vascular injury, urinary incontinence, impotence, disease recurrence and need for secondary procedures, patient was properly identified and placed supine in the operating room theater  General anesthesia was administered  He was then placed in the low dorsal lithotomy position with arms tucked at the side care to pad all contact points  Sterilely prepped and draped in usual fashion  #18 Malay Theodore catheter was placed  A 12 mm incision was made with a 11 blade supraumbilical region   Veress needle was introduced and the abdomen insufflated to 15 mmHg  A 12 mm trocar was then inserted  Laparoscopy was performed showing no significant intra-abdominal adhesions  The remaining trochars were placed at this time  Two 8 mm trochars were placed below the umbilicus in the midclavicular line bilaterally  A 8 mm trochar was placed above the left iliac crest  A 12 mm trocar was placed above the right iliac crest  The patient was placed in steep Trendelenburg and the robot docked at this time  Dissection was initiated by mobilizing the bladder from its anterior peritoneal attachments  Space of Retzius was entered and fibrofatty tissue overlying the prostate was resected  Endopelvic fascia was incised bilaterally working from the base of the prostate toward the apex  With the apex reached the dorsal venous complex was oversewn with 2 interrupted figure-of-eight 0-Vicryl sutures  Prostatovesical junction was taken down in a bladder neck sparing approach  Theodore catheter was then withdrawn and posterior bladder neck incised  Dissection proceeded further until the seminal vesicles and vasa were reached  The vasa were transected and the seminal vesicles dissected out in their entirety  Prostate pedicles were then taken down with a combination of bipolar cautery and sharp scissor dissection  The dorsal venous complex and urethra were then sharply transected as well  Specimen was set aside within an Endo-Catch bag  Inspection showed no evidence of rectal injury  Vesicourethral anastomosis is done in a standard fashion using a double-armed 3-0 Monocryl suture  With the posterior half of the anastomosis completed, a new catheter was placed under direct vision into the bladder  Anterior anastomosis was then performed and then tied down  Irrigation showed no evidence of extravasation  Therefore at this time the robot was undocked  Specimen string was retrieved via the midline port   A 10 flat LAURA was placed via the left a robotic trocar  The abdomen was then desufflated and all trochars were removed  Midline incision was extended slightly to facilitate delivery of the prostate specimen  Fascia was closed with #1 PDS  Skin was closed with 4-0 Monocryl and histacryl  Patient awakened from anesthesia having tolerated the procedure well       I was present for the entire procedure    Patient Disposition:  PACU     SIGNATURE: Alber Jean MD  DATE: May 23, 2018  TIME: 11:46 AM

## 2018-05-24 VITALS
TEMPERATURE: 99.3 F | OXYGEN SATURATION: 98 % | HEART RATE: 76 BPM | DIASTOLIC BLOOD PRESSURE: 73 MMHG | RESPIRATION RATE: 18 BRPM | HEIGHT: 73 IN | SYSTOLIC BLOOD PRESSURE: 134 MMHG | WEIGHT: 190 LBS | BODY MASS INDEX: 25.18 KG/M2

## 2018-05-24 LAB
ANION GAP SERPL CALCULATED.3IONS-SCNC: 5 MMOL/L (ref 4–13)
BUN SERPL-MCNC: 10 MG/DL (ref 5–25)
CALCIUM SERPL-MCNC: 8.5 MG/DL (ref 8.3–10.1)
CHLORIDE SERPL-SCNC: 109 MMOL/L (ref 100–108)
CO2 SERPL-SCNC: 27 MMOL/L (ref 21–32)
CREAT SERPL-MCNC: 0.97 MG/DL (ref 0.6–1.3)
ERYTHROCYTE [DISTWIDTH] IN BLOOD BY AUTOMATED COUNT: 12.5 % (ref 11.6–15.1)
GFR SERPL CREATININE-BSD FRML MDRD: 80 ML/MIN/1.73SQ M
GLUCOSE SERPL-MCNC: 121 MG/DL (ref 65–140)
HCT VFR BLD AUTO: 37.2 % (ref 36.5–49.3)
HGB BLD-MCNC: 12.7 G/DL (ref 12–17)
MCH RBC QN AUTO: 31.8 PG (ref 26.8–34.3)
MCHC RBC AUTO-ENTMCNC: 34.1 G/DL (ref 31.4–37.4)
MCV RBC AUTO: 93 FL (ref 82–98)
PLATELET # BLD AUTO: 115 THOUSANDS/UL (ref 149–390)
PMV BLD AUTO: 8.9 FL (ref 8.9–12.7)
POTASSIUM SERPL-SCNC: 4.7 MMOL/L (ref 3.5–5.3)
RBC # BLD AUTO: 4 MILLION/UL (ref 3.88–5.62)
SODIUM SERPL-SCNC: 141 MMOL/L (ref 136–145)
WBC # BLD AUTO: 7.18 THOUSAND/UL (ref 4.31–10.16)

## 2018-05-24 PROCEDURE — 99024 POSTOP FOLLOW-UP VISIT: CPT | Performed by: NURSE PRACTITIONER

## 2018-05-24 PROCEDURE — 80048 BASIC METABOLIC PNL TOTAL CA: CPT | Performed by: UROLOGY

## 2018-05-24 PROCEDURE — 85027 COMPLETE CBC AUTOMATED: CPT | Performed by: UROLOGY

## 2018-05-24 RX ORDER — HYDROCODONE BITARTRATE AND ACETAMINOPHEN 5; 325 MG/1; MG/1
1 TABLET ORAL EVERY 4 HOURS PRN
Qty: 20 TABLET | Refills: 0 | Status: SHIPPED | OUTPATIENT
Start: 2018-05-24 | End: 2018-06-03

## 2018-05-24 RX ORDER — CIPROFLOXACIN 500 MG/1
500 TABLET, FILM COATED ORAL 2 TIMES DAILY
Qty: 6 TABLET | Refills: 0 | Status: SHIPPED | OUTPATIENT
Start: 2018-05-24 | End: 2018-05-27

## 2018-05-24 RX ADMIN — LISINOPRIL 5 MG: 5 TABLET ORAL at 08:46

## 2018-05-24 RX ADMIN — DEXTROSE, SODIUM CHLORIDE, SODIUM LACTATE, POTASSIUM CHLORIDE, AND CALCIUM CHLORIDE 125 ML/HR: 5; .6; .31; .03; .02 INJECTION, SOLUTION INTRAVENOUS at 06:12

## 2018-05-24 RX ADMIN — BACITRACIN, NEOMYCIN, POLYMYXIN B 1 SMALL APPLICATION: 400; 3.5; 5 OINTMENT TOPICAL at 08:46

## 2018-05-24 RX ADMIN — CEFAZOLIN SODIUM 1000 MG: 1 SOLUTION INTRAVENOUS at 00:12

## 2018-05-24 RX ADMIN — LEVETIRACETAM 1000 MG: 500 TABLET ORAL at 08:46

## 2018-05-24 RX ADMIN — Medication 2 TABLET: at 08:46

## 2018-05-24 RX ADMIN — LORATADINE 10 MG: 10 TABLET ORAL at 08:46

## 2018-05-24 RX ADMIN — METRONIDAZOLE 500 MG: 500 INJECTION, SOLUTION INTRAVENOUS at 00:52

## 2018-05-24 NOTE — POST OP PROGRESS NOTES
UROLOGY PROGRESS NOTE   Patient Identifiers: Ana Dickerson (MRN [id number])  Date of Service: 5/24/2018    Subjective:     24 HR EVENTS:   no significant events  Patient has  no complaints  Patient remains afebrile  He denies any complaints  Objective:     VITALS:    Vitals:    05/23/18 2300   BP: 118/68   Pulse: 81   Resp: 18   Temp: 97 5 °F (36 4 °C)   SpO2: 97%       INS & OUTS:  I/O last 24 hours: In: 4408 3 [P O :800;  I V :3608 3]  Out: 6136 [Urine:3625; Drains:70; Blood:300]    LABS:  Lab Results   Component Value Date    HGB 12 7 05/24/2018    HCT 37 2 05/24/2018    WBC 7 18 05/24/2018     (L) 05/24/2018   ]    Lab Results   Component Value Date     05/24/2018    K 4 7 05/24/2018     (H) 05/24/2018    CO2 27 05/24/2018    BUN 10 05/24/2018    CREATININE 0 97 05/24/2018    CALCIUM 8 5 05/24/2018    GLUCOSE 121 05/24/2018   ]    INPATIENT MEDS:    Current Facility-Administered Medications:     acetaminophen (TYLENOL) tablet 650 mg, 650 mg, Oral, Q4H PRN, Claude Gonzalez MD, 650 mg at 05/23/18 1457    dextrose 5 % in lactated Ringer's infusion, 125 mL/hr, Intravenous, Continuous, Claude Gonzalez MD, Last Rate: 125 mL/hr at 05/24/18 0612, 125 mL/hr at 05/24/18 0612    HYDROcodone-acetaminophen (NORCO) 5-325 mg per tablet 1 tablet, 1 tablet, Oral, Q4H PRN, Claude Gonzalez MD    lactated ringers infusion, 20 mL/hr, Intravenous, Continuous, Nisha Conquest, CRNA, Stopped at 05/23/18 1205    levETIRAcetam (KEPPRA) tablet 1,000 mg, 1,000 mg, Oral, BID, Claude Gonzalez MD, 1,000 mg at 05/23/18 1824    lisinopril (ZESTRIL) tablet 5 mg, 5 mg, Oral, Daily, Claude Gonzalez MD, 5 mg at 05/23/18 1320    loratadine (CLARITIN) tablet 10 mg, 10 mg, Oral, Daily, Claude Gonzalez MD    morphine injection 2 mg, 2 mg, Intravenous, Q1H PRN, Claude Gonzalez MD    naloxone [facility]) 0 04 mg/mL syringe 0 04 mg, 0 04 mg, Intravenous, Q1MIN PRN, Claude Gonzalez MD    neomycin-bacitracin-polymyxin b (NEOSPORIN) ointment 1 small application, 1 small application, Topical, BID, Bertie Goodell, MD, 1 small application at 24/06/37 1824    ondansetron [facility] injection 4 mg, 4 mg, Intravenous, Q4H PRN, Bertie Goodell, MD    pravastatin (PRAVACHOL) tablet 40 mg, 40 mg, Oral, Daily With Kenneth Mcmanus MD, 40 mg at 05/23/18 1642    senna-docusate sodium (SENOKOT S) 8 6-50 mg per tablet 2 tablet, 2 tablet, Oral, Daily, Bertie Goodell, MD, 2 tablet at 05/23/18 1320      Physical Exam:     GEN: alert and oriented x 3    RESP: breathing comfortably with no accessory muscle use    ABD: soft, appropriately tender to palpation, non-distended   INCISION: no erythema, induration, or drainage   EXT: no significant peripheral edema   DRAINS: serosanguineous  CHAVES: in place draining clear yellow urine  no clots        Assessment:   POD 1 prostatectomy radical with robotic    Plan:   -Remains stable, creatinine 0 97, WBC 7 18, Hgb 12 7  -LAURA output 70cc in last 24 hours  Removed at bedside without any difficulties  -Advance diet, OOB and ambulating, continue incentive spirometer, pain control  -Maintain chaves catheter, patient will be sent home with catheter, discharge teaching  -Discharge planning, follow up in approximately 2 weeks      RN participated in rounds with AP  Plan of care discussed with patient and RN

## 2018-05-24 NOTE — DISCHARGE SUMMARY
Discharge Summary   Mariaelena Ling 76 y o  male MRN: [id number]  Unit/Bed#: [facility] 833-01 Encounter: [id number]    Admission Date:  5/23/18    Discharge Date: 5/24/18    Admitting Diagnosis: Prostate cancer [facility]) Aide Verdugo    Discharge Diagnosis: Prostate cancer    Resolved Problems  Date Reviewed: 3/6/2018    None          Attending: Dr Gilmer Valdez    Procedures Performed: No orders of the defined types were placed in this encounter  Robotic radical prostatectomy    Pathology: Pending    Hospital Course: 1 day    Condition at Discharge: good     Discharge instructions/Information to patient and family:   See after visit summary for information provided to patient and family  Provisions for Follow-Up Care:  See after visit summary for information related to follow-up care and any pertinent home health orders  Disposition: See After Visit Summary for discharge disposition information  Planned Readmission: No    Discharge Statement   I spent 25 minutes discharging the patient  This time was spent on the day of discharge  I had direct contact with the patient on the day of discharge  Additional documentation is required if more than 30 minutes were spent on discharge  Discharge Medications:  See after visit summary for reconciled discharge medications provided to patient and family

## 2018-05-25 ENCOUNTER — TELEPHONE (OUTPATIENT)
Dept: UROLOGY | Facility: AMBULATORY SURGERY CENTER | Age: 68
End: 2018-05-25

## 2018-05-25 DIAGNOSIS — C61 PROSTATE CANCER (HCC): Primary | ICD-10-CM

## 2018-05-25 LAB
ABO GROUP BLD BPU: NORMAL
ABO GROUP BLD BPU: NORMAL
BPU ID: NORMAL
BPU ID: NORMAL
CROSSMATCH: NORMAL
CROSSMATCH: NORMAL
UNIT DISPENSE STATUS: NORMAL
UNIT DISPENSE STATUS: NORMAL
UNIT PRODUCT CODE: NORMAL
UNIT PRODUCT CODE: NORMAL
UNIT RH: NORMAL
UNIT RH: NORMAL

## 2018-05-31 DIAGNOSIS — C61 PROSTATE CANCER (HCC): Primary | ICD-10-CM

## 2018-05-31 RX ORDER — CIPROFLOXACIN 500 MG/1
500 TABLET, FILM COATED ORAL EVERY 12 HOURS SCHEDULED
Qty: 6 TABLET | Refills: 0 | Status: SHIPPED | OUTPATIENT
Start: 2018-05-31 | End: 2018-06-03

## 2018-05-31 NOTE — TELEPHONE ENCOUNTER
Patient of Dr Stew Sanders s/p prostatectomy on 5/23/18 is scheduled to come in for discussion and Theodore removal on 6/7/18  He does not have the script for Cipro  He would like it sent to [facility] on [address] [facility]  He is aware to start Cipro the morning before Theodore removal   Patient also asked if leaking around the sides of catheter near the penis is normal   Advised patient yes, that is due to bladder spasms  Patient states it is not much and does not leak all the time

## 2018-06-05 NOTE — PROGRESS NOTES
6/7/2018    Glenna Cook  1950  [id number]    Discussion and Plan    Patient continues to recover well status post [place] prostatectomy for stage T2c [place] 7 adenocarcinoma  Theodore catheter removed without incident  Kegel exercises reviewed  Patient will otherwise return in 1 month with PSA prior to visit  All questions answered at this time  1  Prostate cancer ([facility] [address] )  - PSA Total, Diagnostic; Future    Assessment      Patient Active Problem List   Diagnosis    Prostate cancer [facility])       History of Present Illness    Monica Moraes is a 76 y o  male seen today in regards to a history of persistently elevated PSA of 12 2 and biopsy proven prostate cancer  PSA in 2013 was 2 3  Denies any recent systemic illness  No known family history of prostate cancer  He offers no urinary complaints other than enough air flow with otherwise complete bladder emptying sensation  Denies prior prostatitis, UTI, or hematuria  His primary physician had sent him for an MRI of the prostate which also confirms the presence of a suspicious area in the right peripheral zone  Prostate biopsies demonstrating [place] 7 cancer  Staging CT shows no evidence of metastatic disease  He recently underwent DaVinci prostatectomy  Pathology confirmed [place] 7 stage T2c adenocarcinoma with all margins negative  Findings reviewed with patient and wife      Urinary Symptom Assessment        Past Medical History  Past Medical History:   Diagnosis Date    Basal cell carcinoma     Hyperlipidemia     Hypertension     PONV (postoperative nausea and vomiting)     Seizures (HCC)     simple complex partial seizure--last seizure in 2002        Past Social History  Past Surgical History:   Procedure Laterality Date    INGUINAL HERNIA REPAIR      [place] LAP,PROSTATECTOMY,RADICAL,W/NERVE SPARE,INCL ROBOTIC N/A 5/23/2018    Procedure: Whitley Clipper W ROBOTICS;  Surgeon: Loni Herron MD;  [facility];  Service: Urology    PROSTATE BIOPSY 02/01/2018    Dr Abiodun Craft         Past Family History  Family History   Problem Relation Age of Onset    No Known Problems Mother     Cancer Brother        Past Social history  Social History     Social History    Marital status: /Civil Union     Spouse name: N/A    Number of children: N/A    Years of education: N/A     Occupational History    Not on file  Social History Main Topics    Smoking status: Never Smoker    Smokeless tobacco: Never Used    Alcohol use No    Drug use: No    Sexual activity: Not on file     Other Topics Concern    Not on file     Social History Narrative    No narrative on file       Current Medications  Current Outpatient Prescriptions   Medication Sig Dispense Refill    aspirin (ASPIRIN LOW DOSE) 81 MG tablet Take 1 tablet by mouth daily      calcium citrate-vitamin D (CITRACAL+D) 315-200 MG-UNIT per tablet Take by mouth 2 (two) times a day        fexofenadine (ALLEGRA) 180 MG tablet Take 1 tablet by mouth daily      levETIRAcetam (KEPPRA) 1000 MG tablet Take 1 tablet by mouth 2 (two) times a day      lisinopril (ZESTRIL) 5 mg tablet Take 1 tablet by mouth daily      Melatonin 5 MG TABS Take 5 mg by mouth daily at bedtime        Pediatric Multivitamins-Fl (MULTIVITAMINS/FL PO) Take 1 capsule by mouth daily      simvastatin (ZOCOR) 40 mg tablet Take 1 tablet by mouth       No current facility-administered medications for this visit  Allergies  Allergies   Allergen Reactions    Molds & Smuts      Other reaction(s): Respiratory Distress  Congestion, sore throat, coughing  seasonal       Past Medical History, Social History, Family History, medications and allergies were reviewed  Review of Systems  Review of Systems   Constitutional: Negative  HENT: Negative  Eyes: Negative  Respiratory: Negative  Cardiovascular: Negative  Gastrointestinal: Negative  Endocrine: Negative      Musculoskeletal: Negative  Skin: Negative  Neurological: Negative  Hematological: Negative  Psychiatric/Behavioral: Negative  Vitals  Vitals:    06/07/18 1114   BP: 130/84   BP Location: Left arm   Patient Position: Sitting   Cuff Size: Adult   Pulse: 76   Weight: 86 4 kg (190 lb 6 4 oz)   Height: 6' 1" (1 854 m)         Physical Exam    Physical Exam   Constitutional: He is oriented to person, place, and time  He appears well-developed and well-nourished  HENT:   Head: Normocephalic and atraumatic  Eyes: Pupils are equal, round, and reactive to light  Neck: Normal range of motion  Cardiovascular: Normal rate, regular rhythm and normal heart sounds  Pulmonary/Chest: Effort normal and breath sounds normal  No accessory muscle usage  No respiratory distress  Abdominal: Soft  Normal appearance and bowel sounds are normal  There is no tenderness  Musculoskeletal: Normal range of motion  Neurological: He is alert and oriented to person, place, and time  Skin: Skin is warm, dry and intact  Psychiatric: He has a normal mood and affect  His speech is normal  Cognition and memory are normal    Nursing note and vitals reviewed  Results    Below listed labs, pathology results, and radiology images were personally reviewed:    No results found for: PSA  Lab Results   Component Value Date    GLUCOSE 121 05/24/2018    CALCIUM 8 5 05/24/2018     05/24/2018    K 4 7 05/24/2018    CO2 27 05/24/2018     (H) 05/24/2018    BUN 10 05/24/2018    CREATININE 0 97 05/24/2018     Lab Results   Component Value Date    WBC 7 18 05/24/2018    HGB 12 7 05/24/2018    HCT 37 2 05/24/2018    MCV 93 05/24/2018     (L) 05/24/2018       No results found for this or any previous visit (from the past 1 hour(s))  ]    Surgical Pathology Report                         Case: [id number]                                    Authorizing Provider: Winsome Hackett MD             Collected:           05/23/2018 1102               Ordering Location:     [facility]      Received:            05/23/2018 [address] [place] [address] Operating Room                                                       Pathologist:           Apolonia Rao MD                                                         Specimen:    Prostate                                                                                   Final Diagnosis   A  Prostate, radical prostatectomy:  -  Prostatic adenocarcinoma, acinar type, Davis grade 4+3=7 (see synoptic report)      PROSTATE CARCINOMA TUMOR STAGING SUMMARY (includes specimen A of this case):  1  Specimen identification:       - Procedure:  Radical prostatectomy     - Prostate size and weight:  4 9 x 4 2 x 4 1 cm, 56g     - Lymph node sampling:  Not performed   2  Tumor     - Histologic type:  Acinar adenocarcinoma     - Histologic Grade: Min Pattern:       * primary pattern:  4     * secondary pattern:  3     * tertiary pattern:  N/A     * total Min Score: 7      * Grade Group:  3     * Percentage of pattern 4:60-70%     * Percentage of pattern 5: N/A     * Intraductal carcinoma: Not identified    - Tumor quantitation:  5-10%    - Extraprostatic extension (pT2): Not identified    - Urinary bladder neck invasion:  Not identified    - Seminal vesicle invasion:  Not identified   5  Margins: All surgical resection margins negative for carcinoma   6  Margin positivity in area of extraprostatic extension:  N/A   7  Treatment effect on carcinoma:  No known prior therapy   8  Lymph-vascular invasion:  Not identified (confirmed by immunohistochemical stains performed with appropriate controls on block A31 for CD31 and D2-40)     9  Perineural invasion:  Present     10  Regional lymph nodes (pNX):  No lymph nodes submitted  11  Additional pathologic findings:  atrophy    12  Ancillary studies:  N/A  13  PSA: No recent studies available  14   Best representative block if additional studies are needed:  A30  15  8th Ed AJCC Tumor Stage:  at least Stage  I -pT2, pNx, Kress's score  4+3=7      Electronically signed by Anne-Marie Tarango MD on 5/29/2018 at  8:20 AM

## 2018-06-07 ENCOUNTER — OFFICE VISIT (OUTPATIENT)
Dept: UROLOGY | Facility: AMBULATORY SURGERY CENTER | Age: 68
End: 2018-06-07

## 2018-06-07 VITALS
BODY MASS INDEX: 25.23 KG/M2 | WEIGHT: 190.4 LBS | HEART RATE: 76 BPM | HEIGHT: 73 IN | SYSTOLIC BLOOD PRESSURE: 130 MMHG | DIASTOLIC BLOOD PRESSURE: 84 MMHG

## 2018-06-07 DIAGNOSIS — C61 PROSTATE CANCER (HCC): Primary | ICD-10-CM

## 2018-06-07 PROCEDURE — 99024 POSTOP FOLLOW-UP VISIT: CPT | Performed by: UROLOGY

## 2018-06-11 ENCOUNTER — TELEPHONE (OUTPATIENT)
Dept: UROLOGY | Facility: AMBULATORY SURGERY CENTER | Age: 68
End: 2018-06-11

## 2018-06-11 NOTE — TELEPHONE ENCOUNTER
Patient managed by Dr Nakul Pepper and seen at the [facility] office on 6/7/18 for post op prostatectomy visit and Theodore catheter removal       Patient is calling today to report the tip of his penis where the catheter was is very reddened and painful  Patient has been applying Neosporin to the tip as instructed  Leakage of urine is making the situation worse and adding discomfort to the area  Patient is asking if there is anything else he could do to decrease the irritation and redness at the urethral opening

## 2018-06-11 NOTE — TELEPHONE ENCOUNTER
Called and spoke with patient informing him to apply cream 2x daily  Patient knows to call office if he seems to be getting worse or if the area is still irritated  He also informed that he is still experiencing leakage and I informed him that is normal to keep on with muscle exercises and it will subside within a few weeks to a couple months   All questions were answered at time of conversation

## 2018-06-11 NOTE — TELEPHONE ENCOUNTER
Recommend Neosporin to head of penis twice a day, if symptoms worsen or do not improve by the end of the week patient should notify our office

## 2018-06-15 ENCOUNTER — TELEPHONE (OUTPATIENT)
Dept: UROLOGY | Facility: AMBULATORY SURGERY CENTER | Age: 68
End: 2018-06-15

## 2018-06-15 DIAGNOSIS — B37.2 CANDIDAL DERMATITIS: Primary | ICD-10-CM

## 2018-06-15 RX ORDER — NYSTATIN 100000 [USP'U]/G
POWDER TOPICAL 2 TIMES DAILY
Qty: 15 G | Refills: 0 | Status: SHIPPED | OUTPATIENT
Start: 2018-06-15 | End: 2018-07-03 | Stop reason: SDUPTHER

## 2018-06-15 NOTE — TELEPHONE ENCOUNTER
Spoke with patient  He states the rash started around the groin, but has gotten worse and is also present on upper thighs  Patient is wearing depends for his leaking s/p DVP, and admits the environment is very moist from the leakage  He is also complaining of redness and bumps at the site of urethra where Theodore was  It is very irritated and moist environment is making it more difficult to get rid of  Up until today, patient has been placing Neosporin on the rash  Please advise and if script needed, send to [facility] [address]

## 2018-06-15 NOTE — TELEPHONE ENCOUNTER
Spoke with patient's wife, Lachelle Winston  Made her aware of Nystain powder and how to use  Instructed wife to make sure patient keeps area clean and dry  Instructed wife, if no improvement, patient should call the office

## 2018-06-15 NOTE — TELEPHONE ENCOUNTER
Patient managed by Dr Bebe Drake and s/p prostatectomy on 5/23/18  Patient is c/o rash on both sides of his legs which is not improving with ointment he is using  The rash is becoming more red and is painful  Phone lines are down in the office  Will call patient once phone service is available

## 2018-07-02 NOTE — PROGRESS NOTES
7/3/2018    Hanh Barrow  1950  [id number]      Assessment  -[place] 7 T2c prostate cancer s/p DVP 5/23/18    Discussion/Plan  Jose Richardson is a 76 y o  male being managed by Dr Lee Vick        -We reviewed results of recent PSA post DVP which is 0 05  -Encouraged patient to continue practicing people exercises for urinary incontinence  We also reviewed performing timed voiding to ensure complete bladder emptying  We discussed referral to pelvic floor physical therapy  However at this time patient would like to continue monitoring urinary symptoms and will call our office if he wishes to pursue  Refill for nystatin powder sent to patient's pharmacy  -Follow up in 3 months with PSA  -All questions answered, patient agrees with plan      History of Present Illness  76 y o  male with a history of [place] 7 T2c prostate cancer s/p DaVinci prostatectomy 5/23/18 presents today for follow up  Theodore catheter was removed on 6/7/18 without any difficulty  Patient had called office numerous times after surgery for new onset groin rash and urinary leakage  He was prescribed Nystatin powder  Patient states that he continues to have urinary incontinence which he finds bothersome  He wears 1 sanitary pad per day and finds that symptoms are exacerbated with change in position  He denies any leakage at night  Patient states that he is an avid swimmer and finds that symptoms are prohibiting him  Persistent leakage has caused groin rash however nystatin powder has helped  He denies any episodes of gross hematuria or dysuria  He is otherwise doing well postoperatively  Review of Systems  Review of Systems   Constitutional: Negative  HENT: Negative  Respiratory: Negative  Cardiovascular: Negative  Gastrointestinal: Negative  Genitourinary: Negative for decreased urine volume, difficulty urinating, dysuria, enuresis, frequency, hematuria and urgency  Urinary incontinence   Musculoskeletal: Negative  Skin: Negative  Neurological: Negative  Psychiatric/Behavioral: Negative  AUA SYMPTOM SCORE      Most Recent Value   AUA SYMPTOM SCORE   How often have you had a sensation of not emptying your bladder completely after you finished urinating? 0   How often have you had to urinate again less than two hours after you finished urinating? 2   How often have you found you stopped and started again several times when you urinate?  0   How often have you found it difficult to postpone urination? 0   How often have you had a weak urinary stream?  0   How often have you had to push or strain to begin urination? 0   How many times did you most typically get up to urinate from the time you went to bed at night until the time you got up in the morning? 2   Quality of Life: If you were to spend the rest of your life with your urinary condition just the way it is now, how would you feel about that?  3   AUA SYMPTOM SCORE  4            Past Medical History  Past Medical History:   Diagnosis Date    Basal cell carcinoma     Hyperlipidemia     Hypertension     PONV (postoperative nausea and vomiting)     Seizures (HCC)     simple complex partial seizure--last seizure in 2002        Past Social History  Past Surgical History:   Procedure Laterality Date    INGUINAL HERNIA REPAIR      AL LAP,PROSTATECTOMY,RADICAL,W/NERVE SPARE,INCL ROBOTIC N/A 5/23/2018    Procedure: Suzanne Blackman;  Surgeon: Luciana Lundberg MD;  [facility];  Service: Urology    PROSTATE BIOPSY  02/01/2018    Dr Nathan Cruz         Past Family History  Family History   Problem Relation Age of Onset    No Known Problems Mother     Cancer Brother        Past Social history  Social History     Social History    Marital status: /Civil Union     Spouse name: N/A    Number of children: N/A    Years of education: N/A     Occupational History    Not on file       Social History Main Topics    Smoking status: Never Smoker    Smokeless tobacco: Never Used    Alcohol use No    Drug use: No    Sexual activity: Not on file     Other Topics Concern    Not on file     Social History Narrative    No narrative on file       Current Medications  Current Outpatient Prescriptions   Medication Sig Dispense Refill    aspirin (ASPIRIN LOW DOSE) 81 MG tablet Take 1 tablet by mouth daily      calcium citrate-vitamin D (CITRACAL+D) 315-200 MG-UNIT per tablet Take by mouth 2 (two) times a day        fexofenadine (ALLEGRA) 180 MG tablet Take 1 tablet by mouth daily      levETIRAcetam (KEPPRA) 1000 MG tablet Take 1 tablet by mouth 2 (two) times a day      lisinopril (ZESTRIL) 5 mg tablet Take 1 tablet by mouth daily      Melatonin 5 MG TABS Take 5 mg by mouth daily at bedtime        nystatin (MYCOSTATIN) powder Apply topically 2 (two) times a day 15 g 0    Pediatric Multivitamins-Fl (MULTIVITAMINS/FL PO) Take 1 capsule by mouth daily      simvastatin (ZOCOR) 40 mg tablet Take 1 tablet by mouth       No current facility-administered medications for this visit  Allergies  Allergies   Allergen Reactions    Molds & Smuts      Other reaction(s): Respiratory Distress  Congestion, sore throat, coughing  seasonal       Past Medical History, Social History, Family History, medications and allergies were reviewed  Vitals  There were no vitals filed for this visit  Physical Exam  Physical Exam   Constitutional: He is oriented to person, place, and time  He appears well-developed and well-nourished  HENT:   Head: Normocephalic  Eyes: Pupils are equal, round, and reactive to light  Neck: Normal range of motion  Cardiovascular: Normal rate and regular rhythm  Pulmonary/Chest: Effort normal    Abdominal: Soft  Normal appearance  There is no CVA tenderness  Musculoskeletal: Normal range of motion  Neurological: He is alert and oriented to person, place, and time   He has normal reflexes  Skin: Skin is warm and dry  Psychiatric: He has a normal mood and affect  His behavior is normal  Judgment and thought content normal        Results    I have personally reviewed all pertinent lab results and reviewed with patient  No results found for: PSA  Lab Results   Component Value Date    GLUCOSE 121 05/24/2018    CALCIUM 8 5 05/24/2018     05/24/2018    K 4 7 05/24/2018    CO2 27 05/24/2018     (H) 05/24/2018    BUN 10 05/24/2018    CREATININE 0 97 05/24/2018     Lab Results   Component Value Date    WBC 7 18 05/24/2018    HGB 12 7 05/24/2018    HCT 37 2 05/24/2018    MCV 93 05/24/2018     (L) 05/24/2018     No results found for this or any previous visit (from the past 1 hour(s))

## 2018-07-03 ENCOUNTER — OFFICE VISIT (OUTPATIENT)
Dept: UROLOGY | Facility: AMBULATORY SURGERY CENTER | Age: 68
End: 2018-07-03

## 2018-07-03 ENCOUNTER — TELEPHONE (OUTPATIENT)
Dept: UROLOGY | Facility: AMBULATORY SURGERY CENTER | Age: 68
End: 2018-07-03

## 2018-07-03 VITALS
HEIGHT: 73 IN | WEIGHT: 191.6 LBS | BODY MASS INDEX: 25.39 KG/M2 | HEART RATE: 80 BPM | DIASTOLIC BLOOD PRESSURE: 70 MMHG | SYSTOLIC BLOOD PRESSURE: 110 MMHG

## 2018-07-03 DIAGNOSIS — C61 PROSTATE CANCER (HCC): Primary | ICD-10-CM

## 2018-07-03 DIAGNOSIS — B37.2 CANDIDAL DERMATITIS: ICD-10-CM

## 2018-07-03 PROCEDURE — 99024 POSTOP FOLLOW-UP VISIT: CPT | Performed by: NURSE PRACTITIONER

## 2018-07-03 RX ORDER — NYSTATIN 100000 [USP'U]/G
POWDER TOPICAL 2 TIMES DAILY
Qty: 15 G | Refills: 1 | Status: SHIPPED | OUTPATIENT
Start: 2018-07-03 | End: 2019-01-28 | Stop reason: ALTCHOICE

## 2018-07-03 NOTE — TELEPHONE ENCOUNTER
Patient managed by Dr Evon Cage and s/p prostatectomy on 5/23/18  He just saw Murleen People, and forgot to ask about any lifting restrictions  Advised patient lifting restrictions are lifted after 6 wks post op  Patient verbalized understanding

## 2018-07-10 ENCOUNTER — TELEPHONE (OUTPATIENT)
Dept: UROLOGY | Facility: CLINIC | Age: 68
End: 2018-07-10

## 2018-07-10 NOTE — TELEPHONE ENCOUNTER
Called and spoke to patient  Patient accepted appointment for tomorrow at 8:45 with [place] and the [place] office  Appointment was scheduled by Haily since schedule was on hold

## 2018-07-10 NOTE — TELEPHONE ENCOUNTER
Pt calling, said he did not want to be transferred to [facility], would like a call back    [phone]

## 2018-07-10 NOTE — TELEPHONE ENCOUNTER
Patient managed by Dr Krueger at the [facility]  Patient is s/p prostatectomy on 5/23/18 and was last seen on 7/3/18  Patient called and left message stating that were the drainage tube came out is red and swollen  Called and spoke to patient  Patient states that the drainage tube came out of his abdomen and that where it was is red and swollen  Patient reports that there is a clear drainage and a little bit of blood coming out  Patient states that it does look like it has opened up a little  Patient denies any fevers, chills, nausea or vomiting  Patient states that he has been keeping it covered and has been applying neosporin

## 2018-07-10 NOTE — TELEPHONE ENCOUNTER
Patient should be scheduled for an OV wound check   Offer patient 8:45 with Caryle Purser at [place] 7/11

## 2018-07-11 ENCOUNTER — OFFICE VISIT (OUTPATIENT)
Dept: UROLOGY | Facility: AMBULATORY SURGERY CENTER | Age: 68
End: 2018-07-11

## 2018-07-11 VITALS
HEART RATE: 66 BPM | DIASTOLIC BLOOD PRESSURE: 74 MMHG | BODY MASS INDEX: 25.29 KG/M2 | WEIGHT: 190.8 LBS | SYSTOLIC BLOOD PRESSURE: 136 MMHG | HEIGHT: 73 IN

## 2018-07-11 DIAGNOSIS — C61 PROSTATE CANCER (HCC): Primary | ICD-10-CM

## 2018-07-11 PROCEDURE — 99024 POSTOP FOLLOW-UP VISIT: CPT | Performed by: PHYSICIAN ASSISTANT

## 2018-07-11 RX ORDER — AMOXICILLIN AND CLAVULANATE POTASSIUM 875; 125 MG/1; MG/1
1 TABLET, FILM COATED ORAL EVERY 12 HOURS SCHEDULED
COMMUNITY
Start: 2018-07-08 | End: 2019-01-28

## 2018-07-11 NOTE — PROGRESS NOTES
7/11/2018      Chief Complaint   Patient presents with    Prostate Cancer    Wound Check       Assessment and Plan    76 y o  male managed by Dr Grey Decker    1  [place] 7 T2c prostate cancer s/p RALP 5/23/18  - due again for PSA 3 months     2  Wound check  - wound appears non-infected, is on Augmentin for cellulitis of the finger, will finish this and if recurrence of his wound erythema and drainage occurs will notify our office       History of Present Illness  Lashay Maciel is a 76 y o  male with [place] 7 T2c prostate cancer s/p RALP 5/23/18 here for evaluation of his LAURA drain insertion site  The patient states two days ago it was erythematous and with drainage  Woke up this morning with much improvement  Started Augmentin Sunday for cellulitis of his finger  Review of Systems   Constitutional: Negative for activity change, chills and fever  Gastrointestinal: Negative for abdominal distention and abdominal pain  Musculoskeletal: Negative for back pain and gait problem  Psychiatric/Behavioral: Negative for behavioral problems and confusion         Past Medical History  Past Medical History:   Diagnosis Date    Basal cell carcinoma     Hyperlipidemia     Hypertension     PONV (postoperative nausea and vomiting)     Seizures (HCC)     simple complex partial seizure--last seizure in 2002     Urinary incontinence        Past Social History  Past Surgical History:   Procedure Laterality Date    INGUINAL HERNIA REPAIR      [place] LAP,PROSTATECTOMY,RADICAL,W/NERVE SPARE,INCL ROBOTIC N/A 5/23/2018    Procedure: PROSTATECTOMY RADICAL W ROBOTICS;  Surgeon: Patti Bynum MD;  [facility];  Service: Urology    PROSTATE BIOPSY  02/01/2018    Dr Ida Rubin       History   Smoking Status    Never Smoker   Smokeless Tobacco    Never Used       Past Family History  Family History   Problem Relation Age of Onset    No Known Problems Mother     Cancer Brother Past Social history  Social History     Social History    Marital status: /Civil Union     Spouse name: N/A    Number of children: N/A    Years of education: N/A     Occupational History    Not on file  Social History Main Topics    Smoking status: Never Smoker    Smokeless tobacco: Never Used    Alcohol use No    Drug use: No    Sexual activity: Not on file     Other Topics Concern    Not on file     Social History Narrative    No narrative on file       Current Medications  Current Outpatient Prescriptions   Medication Sig Dispense Refill    amoxicillin-clavulanate (AUGMENTIN) 875-125 mg per tablet Take 1 tablet by mouth every 12 (twelve) hours        aspirin (ASPIRIN LOW DOSE) 81 MG tablet Take 1 tablet by mouth daily      calcium citrate-vitamin D (CITRACAL+D) 315-200 MG-UNIT per tablet Take by mouth 2 (two) times a day        fexofenadine (ALLEGRA) 180 MG tablet Take 1 tablet by mouth daily      levETIRAcetam (KEPPRA) 1000 MG tablet Take 1 tablet by mouth 2 (two) times a day      lisinopril (ZESTRIL) 5 mg tablet Take 1 tablet by mouth daily      Melatonin 5 MG TABS Take 5 mg by mouth daily at bedtime        nystatin (MYCOSTATIN) powder Apply topically 2 (two) times a day 15 g 1    Pediatric Multivitamins-Fl (MULTIVITAMINS/FL PO) Take 1 capsule by mouth daily      simvastatin (ZOCOR) 40 mg tablet Take 1 tablet by mouth       No current facility-administered medications for this visit  Allergies  Allergies   Allergen Reactions    Molds & Smuts      Other reaction(s): Respiratory Distress  Congestion, sore throat, coughing  seasonal    Other      Other reaction(s): Respiratory Distress  Congestion, sore throat, coughing            The following portions of the patient's history were reviewed and updated as appropriate: allergies, current medications, past medical history, past social history, past surgical history and problem list       Vitals  Vitals:    07/11/18 1147 BP: 136/74   BP Location: Left arm   Patient Position: Sitting   Cuff Size: Adult   Pulse: 66   Weight: 86 5 kg (190 lb 12 8 oz)   Height: 6' 1" (1 854 m)           Physical Exam  Constitutional   General appearance: Patient is seated and in no acute distress, well appearing and well nourished  Head and Face   Head and face: Normal     Eyes   Conjunctiva and lids: No erythema, swelling or discharge  Ears, Nose, Mouth, and Throat   Hearing: Normal     Pulmonary   Respiratory effort: No increased work of breathing or signs of respiratory distress  Cardiovascular   Examination of extremities for edema and/or varicosities: Normal     Abdomen   Abdomen: Non-tender, no masses  LAURA drain insertion site well approximated and mildly erythematous with no fluctuance or drainage   Musculoskeletal   Gait and station: Normal    Skin   Skin and subcutaneous tissue: Warm, dry, and intact  No visible lesions or rashes  Psychiatric   Judgment and insight: Normal  Recent and remote memory:  Normal  Mood and affect: Normal      Results  No results found for this or any previous visit (from the past 1 hour(s))  ]  No results found for: PSA  Lab Results   Component Value Date    GLUCOSE 121 05/24/2018    CALCIUM 8 5 05/24/2018     05/24/2018    K 4 7 05/24/2018    CO2 27 05/24/2018     (H) 05/24/2018    BUN 10 05/24/2018    CREATININE 0 97 05/24/2018     Lab Results   Component Value Date    WBC 7 18 05/24/2018    HGB 12 7 05/24/2018    HCT 37 2 05/24/2018    MCV 93 05/24/2018     (L) 05/24/2018       Orders  No orders of the defined types were placed in this encounter

## 2018-08-14 NOTE — TELEPHONE ENCOUNTER
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  Seizures are rare; last one was more than 15 years ago when he self reduced his medication  He is doing well on levetiracetam without cognitive or psychiatric issues  Due to the presence of left temporal epileptiform discharges on the most recent EEG he is not a candidate for medication wean  He was instructed that he can follow-up with his PCP for medication refills given that he has not had a seizure for 15 years, stable dose of levetiracetam 1000mg twice a day, and no side effects  Please call the patient if he intends to follow-up with me for medication refill or is he willing to request future refills from his PCP? If he wants to continue to get LEV refills with us, he needs to make a follow-up appointment, can be seen by [facility]

## 2018-08-16 RX ORDER — LEVETIRACETAM 1000 MG/1
TABLET ORAL
Qty: 180 TABLET | Refills: 3 | OUTPATIENT
Start: 2018-08-16

## 2018-08-16 NOTE — TELEPHONE ENCOUNTER
Called and Left a message on pt's answering machine for a call back  Letter mailed home to contact the office

## 2018-08-20 NOTE — TELEPHONE ENCOUNTER
Pt called and advised pt of all of the below  He verbalized clear understanding of all instructions  He does not intend to f/u with you since he has not had any seizure  He will f/u with his pcp to request future refills        Thanks

## 2018-08-23 ENCOUNTER — TRANSCRIBE ORDERS (OUTPATIENT)
Dept: ADMINISTRATIVE | Facility: HOSPITAL | Age: 68
End: 2018-08-23

## 2018-08-23 DIAGNOSIS — I34.0 NON-RHEUMATIC MITRAL REGURGITATION: Primary | ICD-10-CM

## 2018-09-24 ENCOUNTER — HOSPITAL ENCOUNTER (OUTPATIENT)
Dept: NON INVASIVE DIAGNOSTICS | Facility: CLINIC | Age: 68
Discharge: HOME | End: 2018-09-24
Payer: MEDICARE

## 2018-09-24 DIAGNOSIS — I34.0 NON-RHEUMATIC MITRAL REGURGITATION: ICD-10-CM

## 2018-09-24 PROCEDURE — 93306 TTE W/DOPPLER COMPLETE: CPT | Performed by: INTERNAL MEDICINE

## 2018-09-24 PROCEDURE — 93306 TTE W/DOPPLER COMPLETE: CPT

## 2018-10-08 NOTE — PROGRESS NOTES
10/9/2018    Yokasta Mahmood  1950  [id number]      Assessment  -[place] 7 T2c prostate cancer s/p DVP 5/23/18  -Erectile dysfunction    Discussion/Plan  Cachorro Joshua is a 76 y o  male being managed by Dr Zach Zavala        -We reviewed results of recent PSA which is 0 11, previously 0 05  We discussed follow up in 3 months with repeat PSA  Will continue to monitor at this time  -Patient noted to have tiny fragment of suture at prior left LAURA drain site  This was removed without any difficulty  Instructed patient to continue applying otc Neosporin cream after showering  Advised patient to call with any signs of infection  -Discussed options for management of ED  Patient wishes to try Viagra  Reviewed mechanism of action, as well as administration, and potential side effects  Patient verbalized understanding  Will send prescription to patient's [facility] pharmacy  If medication is too costly, he will call office for prescription to [facility]  -Follow up in 3 months or sooner if needed   -All questions answered, patient agrees with plan      History of Present Illness  76 y o  male with a history of Gl 7 T2c prostate cancer s/p DaVinci prostatectomy 5/23/18 and ED presents today for follow up  Patient was seen in office on 07/11/2018 for evaluation of LAURA drain insertion site  He was advised to finish course of Augmentin prescribed for cellulitis of his finger  Patient states he has been doing well postoperatively  He reports occasional and rare episodes of urinary stress incontinence  He does not require use of sanitary pad or diaper  He denies any episodes of gross hematuria or dysuria  Patient reports left LAURA drain site continues to have mild redness  He denies any purulent drainage, tenderness, no fever or chills  He continues to experience difficulties with sustaining an erection after surgery  Review of Systems  Review of Systems   Constitutional: Negative  HENT: Negative      Respiratory: Negative  Cardiovascular: Negative  Gastrointestinal: Negative  Genitourinary: Negative for decreased urine volume, difficulty urinating, dysuria, flank pain, frequency, hematuria and urgency  Musculoskeletal: Negative  Skin: Negative  Neurological: Negative  Psychiatric/Behavioral: Negative  AUA SYMPTOM SCORE      Most Recent Value   AUA SYMPTOM SCORE   How often have you had a sensation of not emptying your bladder completely after you finished urinating? 0   How often have you had to urinate again less than two hours after you finished urinating? 0   How often have you found you stopped and started again several times when you urinate?  0   How often have you found it difficult to postpone urination? 0   How often have you had a weak urinary stream?  0   How often have you had to push or strain to begin urination?   0   How many times did you most typically get up to urinate from the time you went to bed at night until the time you got up in the morning?  0   Quality of Life: If you were to spend the rest of your life with your urinary condition just the way it is now, how would you feel about that?  2   AUA SYMPTOM SCORE  0            Past Medical History  Past Medical History:   Diagnosis Date    Basal cell carcinoma     Hyperlipidemia     Hypertension     PONV (postoperative nausea and vomiting)     Seizures ([facility] [address] )     simple complex partial seizure--last seizure in 2002     Urinary incontinence        Past Social History  Past Surgical History:   Procedure Laterality Date    INGUINAL HERNIA REPAIR      TN LAP,PROSTATECTOMY,RADICAL,W/NERVE SPARE,INCL ROBOTIC N/A 5/23/2018    Procedure: Jm Winters;  Surgeon: Roshan Young MD;  [facility];  Service: Urology    PROSTATE BIOPSY  02/01/2018    Dr Tania Thompson         Past Family History  Family History   Problem Relation Age of Onset    No Known Problems Mother  Cancer Brother        Past Social history  Social History     Social History    Marital status: /Civil Union     Spouse name: N/A    Number of children: N/A    Years of education: N/A     Occupational History    Not on file  Social History Main Topics    Smoking status: Never Smoker    Smokeless tobacco: Never Used    Alcohol use No    Drug use: No    Sexual activity: Not on file     Other Topics Concern    Not on file     Social History Narrative    No narrative on file       Current Medications  Current Outpatient Prescriptions   Medication Sig Dispense Refill    amoxicillin-clavulanate (AUGMENTIN) 875-125 mg per tablet Take 1 tablet by mouth every 12 (twelve) hours        aspirin (ASPIRIN LOW DOSE) 81 MG tablet Take 1 tablet by mouth daily      calcium citrate-vitamin D (CITRACAL+D) 315-200 MG-UNIT per tablet Take by mouth 2 (two) times a day        fexofenadine (ALLEGRA) 180 MG tablet Take 1 tablet by mouth daily      levETIRAcetam (KEPPRA) 1000 MG tablet Take 1 tablet by mouth 2 (two) times a day      lisinopril (ZESTRIL) 5 mg tablet Take 1 tablet by mouth daily      Melatonin 5 MG TABS Take 5 mg by mouth daily at bedtime        nystatin (MYCOSTATIN) powder Apply topically 2 (two) times a day 15 g 1    Pediatric Multivitamins-Fl (MULTIVITAMINS/FL PO) Take 1 capsule by mouth daily      simvastatin (ZOCOR) 40 mg tablet Take 1 tablet by mouth       No current facility-administered medications for this visit  Allergies  Allergies   Allergen Reactions    Molds & Smuts      Other reaction(s): Respiratory Distress  Congestion, sore throat, coughing  seasonal    Other      Other reaction(s): Respiratory Distress  Congestion, sore throat, coughing  Past Medical History, Social History, Family History, medications and allergies were reviewed  Vitals  There were no vitals filed for this visit      Physical Exam  Physical Exam   Constitutional: He is oriented to person, place, and time  He appears well-developed and well-nourished  HENT:   Head: Normocephalic  Eyes: Pupils are equal, round, and reactive to light  Neck: Normal range of motion  Cardiovascular: Normal rate and regular rhythm  Pulmonary/Chest: Effort normal    Abdominal: Soft  Normal appearance  There is no CVA tenderness  Genitourinary:   Genitourinary Comments: Old right LAURA drain site healed  Old left LAURA drain site appears to have black suture at surface of skin     Musculoskeletal: Normal range of motion  Neurological: He is alert and oriented to person, place, and time  Skin: Skin is warm  Psychiatric: He has a normal mood and affect  His behavior is normal  Judgment and thought content normal        Results    I have personally reviewed all pertinent lab results and reviewed with patient  No results found for: PSA  Lab Results   Component Value Date    GLUCOSE 96 02/19/2015    CALCIUM 8 5 05/24/2018     05/24/2018    K 4 7 05/24/2018    CO2 27 05/24/2018     (H) 05/24/2018    BUN 10 05/24/2018    CREATININE 0 97 05/24/2018     Lab Results   Component Value Date    WBC 7 18 05/24/2018    HGB 12 7 05/24/2018    HCT 37 2 05/24/2018    MCV 93 05/24/2018     (L) 05/24/2018     No results found for this or any previous visit (from the past 1 hour(s))

## 2018-10-09 ENCOUNTER — OFFICE VISIT (OUTPATIENT)
Dept: UROLOGY | Facility: AMBULATORY SURGERY CENTER | Age: 68
End: 2018-10-09
Payer: MEDICARE

## 2018-10-09 VITALS
SYSTOLIC BLOOD PRESSURE: 140 MMHG | HEIGHT: 73 IN | DIASTOLIC BLOOD PRESSURE: 78 MMHG | BODY MASS INDEX: 25.63 KG/M2 | WEIGHT: 193.4 LBS | HEART RATE: 56 BPM

## 2018-10-09 DIAGNOSIS — N52.31 ERECTILE DYSFUNCTION AFTER RADICAL PROSTATECTOMY: ICD-10-CM

## 2018-10-09 DIAGNOSIS — C61 PROSTATE CANCER (HCC): Primary | ICD-10-CM

## 2018-10-09 PROCEDURE — 99213 OFFICE O/P EST LOW 20 MIN: CPT | Performed by: NURSE PRACTITIONER

## 2018-10-09 RX ORDER — SILDENAFIL 50 MG/1
100 TABLET, FILM COATED ORAL DAILY PRN
Qty: 10 TABLET | Refills: 0 | Status: SHIPPED | OUTPATIENT
Start: 2018-10-09 | End: 2019-01-28

## 2018-10-10 DIAGNOSIS — N52.9 ERECTILE DYSFUNCTION, UNSPECIFIED ERECTILE DYSFUNCTION TYPE: Primary | ICD-10-CM

## 2018-10-10 RX ORDER — SILDENAFIL CITRATE 20 MG/1
20 TABLET ORAL AS NEEDED
Qty: 90 TABLET | Refills: 3 | Status: SHIPPED | OUTPATIENT
Start: 2018-10-10 | End: 2018-10-10 | Stop reason: SDUPTHER

## 2018-10-10 RX ORDER — SILDENAFIL CITRATE 20 MG/1
20 TABLET ORAL AS NEEDED
Qty: 90 TABLET | Refills: 2 | Status: SHIPPED | OUTPATIENT
Start: 2018-10-10 | End: 2019-01-28

## 2018-10-10 NOTE — TELEPHONE ENCOUNTER
Patient of Dr Jd Walker with hx of prostate cancer and ED  Patient stated that he had called his insurance company regarding ED medication and they will help pay toward Cialis 5mg but he would rather if you ordered the Sildenafil from [facility]

## 2018-11-19 RX ORDER — TADALAFIL 5 MG/1
5 TABLET ORAL DAILY PRN
Qty: 10 TABLET | Refills: 0 | Status: CANCELLED | OUTPATIENT
Start: 2018-11-19

## 2018-11-19 NOTE — TELEPHONE ENCOUNTER
Patient spoke with insurance Cialis would be the medication which would require an auth please have Dr Krueger put the order in for this medication so I can send information over to the insurance    Thanks  Paulino Negron

## 2018-11-19 NOTE — TELEPHONE ENCOUNTER
I had spoken with patient and he stated that he had tried to take on empty stomach and not effective  He wants to know if he can try another po medication since the Sildenafil did not work  His pharmacy is [facility] on [address]  Please advise

## 2018-11-19 NOTE — TELEPHONE ENCOUNTER
Encourage patient to take on an empty stomach, and allow 1 hour for absorption  Can be common to have ED issues postoperatively  If oral PDE5 inhibitors are not effective, typically next step involves intracavernosal injections

## 2018-11-19 NOTE — TELEPHONE ENCOUNTER
I called patient to have him check with insurance company as to which one would be covered    He will return call and inform us of above and we will escribe to [facility]

## 2018-11-20 RX ORDER — TADALAFIL 20 MG/1
20 TABLET ORAL DAILY PRN
Qty: 10 TABLET | Refills: 11 | Status: SHIPPED | OUTPATIENT
Start: 2018-11-20 | End: 2019-01-28

## 2018-11-27 ENCOUNTER — TELEPHONE (OUTPATIENT)
Dept: UROLOGY | Facility: MEDICAL CENTER | Age: 68
End: 2018-11-27

## 2018-11-27 NOTE — TELEPHONE ENCOUNTER
[facility] calling for verbal auth to Revatio 20 mg and Viagra 50 mg, please call back  [phone]    Reference #- [id number]

## 2018-11-27 NOTE — TELEPHONE ENCOUNTER
I tried to call patient to see which medication that he wanted  I was unable to get patient  The phone rang numerous times with no answer  I then called [facility] and they are not sure which medication the patient wants    They will contact patient and have medication transferred to them

## 2019-01-20 NOTE — PROGRESS NOTES
1/21/2019    Mariel Squires  1950  [id number]    Discussion and Plan    Repeat PSA will be obtained at this time  I discussed the fact that this is concerning for a [address] of residual disease if confirmed  Treatment options discussed including elective pelvic radiation  Referral to Radiation Oncology provided  Patient will continue Viagra  Return in 2 weeks to discuss overall plan  All questions answered at this time  1  Prostate cancer ([facility] [address] )  - PSA Total, Diagnostic; Future  - Ambulatory referral to Radiation Oncology; Future    Assessment      Patient Active Problem List   Diagnosis    Prostate cancer [facility])       History of Present Illness    Jinny Capone is a 76 y o  male seen today in regards to a history of Gl 7 T2c prostate cancer s/p DaVinci prostatectomy 5/23/18 and ED presents today for follow up  Patient was seen in office on 07/11/2018 for evaluation of LAURA drain insertion site  He was advised to finish course of Augmentin prescribed for cellulitis of his finger  Patient states he has been doing well postoperatively  He reports occasional and rare episodes of urinary stress incontinence  He does not require use of sanitary pad or diaper  PSA unfortunately has become detectable at 0 26  I again reviewed his prior pathology which had demonstrated [place] 7 staged T2 prostate cancer with all margins negative      Urinary Symptom Assessment        Past Medical History  Past Medical History:   Diagnosis Date    Basal cell carcinoma     Hyperlipidemia     Hypertension     PONV (postoperative nausea and vomiting)     Seizures ([facility])     simple complex partial seizure--last seizure in 2002     Urinary incontinence        Past Social History  Past Surgical History:   Procedure Laterality Date    INGUINAL HERNIA REPAIR      TN LAP,PROSTATECTOMY,RADICAL,W/NERVE SPARE,INCL ROBOTIC N/A 5/23/2018    Procedure: [facility]JO ROBOTICS;  Surgeon: Douglas Hanson MD;  Location: [facility] OR;  Service: Urology    PROSTATE BIOPSY  02/01/2018    Dr Kojo De La Garza         Past Family History  Family History   Problem Relation Age of Onset    No Known Problems Mother     Cancer Brother        Past Social history  Social History     Social History    Marital status: /Civil Union     Spouse name: N/A    Number of children: N/A    Years of education: N/A     Occupational History    Not on file       Social History Main Topics    Smoking status: Never Smoker    Smokeless tobacco: Never Used    Alcohol use No    Drug use: No    Sexual activity: Not on file     Other Topics Concern    Not on file     Social History Narrative    No narrative on file       Current Medications  Current Outpatient Prescriptions   Medication Sig Dispense Refill    aspirin (ASPIRIN LOW DOSE) 81 MG tablet Take 1 tablet by mouth daily      calcium citrate-vitamin D (CITRACAL+D) 315-200 MG-UNIT per tablet Take by mouth 2 (two) times a day        fexofenadine (ALLEGRA) 180 MG tablet Take 1 tablet by mouth daily      levETIRAcetam (KEPPRA) 1000 MG tablet Take 1 tablet by mouth 2 (two) times a day      lisinopril (ZESTRIL) 5 mg tablet Take 1 tablet by mouth daily      Melatonin 5 MG TABS Take 5 mg by mouth daily at bedtime        nystatin (MYCOSTATIN) powder Apply topically 2 (two) times a day 15 g 1    Pediatric Multivitamins-Fl (MULTIVITAMINS/FL PO) Take 1 capsule by mouth daily      simvastatin (ZOCOR) 40 mg tablet Take 1 tablet by mouth      amoxicillin-clavulanate (AUGMENTIN) 875-125 mg per tablet Take 1 tablet by mouth every 12 (twelve) hours        sildenafil (REVATIO) 20 mg tablet Take 1 tablet (20 mg total) by mouth as needed (ed) Take 1 to 5 tablets 1 hour prior intercourse Start with 1 tablet (Patient not taking: Reported on 1/21/2019 ) 90 tablet 2    sildenafil (VIAGRA) 50 MG tablet Take 2 tablets (100 mg total) by mouth daily as needed for erectile dysfunction Start with 1/2 tablet as needed, may titrate medication to 100mg (Patient not taking: Reported on 1/21/2019 ) 10 tablet 0    tadalafil (CIALIS) 20 MG tablet Take 1 tablet (20 mg total) by mouth daily as needed for erectile dysfunction (Patient not taking: Reported on 1/21/2019 ) 10 tablet 11     No current facility-administered medications for this visit  Allergies  Allergies   Allergen Reactions    Molds & Smuts      Other reaction(s): Respiratory Distress  Congestion, sore throat, coughing  seasonal    Other      Other reaction(s): Respiratory Distress  Congestion, sore throat, coughing  Past Medical History, Social History, Family History, medications and allergies were reviewed  Review of Systems  Review of Systems   Constitutional: Negative  HENT: Negative  Eyes: Negative  Respiratory: Negative  Cardiovascular: Negative  Gastrointestinal: Negative  Endocrine: Negative  Genitourinary: Negative for decreased urine volume, difficulty urinating, hematuria and urgency  Musculoskeletal: Negative  Skin: Negative  Neurological: Negative  Hematological: Negative  Psychiatric/Behavioral: Negative  Vitals  Vitals:    01/21/19 0754   BP: 132/68   BP Location: Left arm   Patient Position: Sitting   Cuff Size: Adult   Pulse: 66   Weight: 88 kg (194 lb)   Height: 6' 1" (1 854 m)         Physical Exam    Physical Exam   Constitutional: He is oriented to person, place, and time  He appears well-developed and well-nourished  HENT:   Head: Normocephalic and atraumatic  Eyes: Pupils are equal, round, and reactive to light  Neck: Normal range of motion  Cardiovascular: Normal rate, regular rhythm and normal heart sounds  Pulmonary/Chest: Effort normal and breath sounds normal  No accessory muscle usage  No respiratory distress  Abdominal: Soft  Normal appearance and bowel sounds are normal  There is no tenderness  Musculoskeletal: Normal range of motion  Neurological: He is alert and oriented to person, place, and time  Skin: Skin is warm, dry and intact  Psychiatric: He has a normal mood and affect  His speech is normal  Cognition and memory are normal    Nursing note and vitals reviewed  Results    Below listed labs, pathology results, and radiology images were personally reviewed:    No results found for: PSA  Lab Results   Component Value Date    GLUCOSE 96 02/19/2015    CALCIUM 8 5 05/24/2018     02/19/2015    K 4 7 05/24/2018    CO2 27 05/24/2018     (H) 05/24/2018    BUN 10 05/24/2018    CREATININE 0 97 05/24/2018     Lab Results   Component Value Date    WBC 7 18 05/24/2018    HGB 12 7 05/24/2018    HCT 37 2 05/24/2018    MCV 93 05/24/2018     (L) 05/24/2018       No results found for this or any previous visit (from the past 1 hour(s)) ]        Case Report   Surgical Pathology Report                         Case: [id number]                                    Authorizing Provider: Toyin Harvey MD             Collected:           05/23/2018 1102               Ordering Location:     [address]      Received:            05/23/2018 [address] [place] [address] Operating Room                                                       Pathologist:           Yazmin Bingham MD                                                         Specimen:    Prostate                                                                                   Final Diagnosis   A  Prostate, radical prostatectomy:  -  Prostatic adenocarcinoma, acinar type, [place] grade 4+3=7 (see synoptic report)      PROSTATE CARCINOMA TUMOR STAGING SUMMARY (includes specimen A of this case):  1  Specimen identification:       - Procedure:  Radical prostatectomy     - Prostate size and weight:  4 9 x 4 2 x 4 1 cm, 56g     - Lymph node sampling:  Not performed   2   Tumor     - Histologic type:  Acinar adenocarcinoma     - Histologic Grade: Min Pattern:       * primary pattern:  4     * secondary pattern:  3     * tertiary pattern:  N/A     * total [place] Score: 7      * Grade Group:  3     * Percentage of pattern 4:60-70%     * Percentage of pattern 5: N/A     * Intraductal carcinoma: Not identified    - Tumor quantitation:  5-10%    - Extraprostatic extension (pT2): Not identified    - Urinary bladder neck invasion:  Not identified    - Seminal vesicle invasion:  Not identified   5  Margins: All surgical resection margins negative for carcinoma   6  Margin positivity in area of extraprostatic extension:  N/A   7  Treatment effect on carcinoma:  No known prior therapy   8  Lymph-vascular invasion:  Not identified (confirmed by immunohistochemical stains performed with appropriate controls on block A31 for CD31 and D2-40)     9  Perineural invasion:  Present     10  Regional lymph nodes (pNX):  No lymph nodes submitted  11  Additional pathologic findings:  atrophy    12  Ancillary studies:  N/A  13  PSA: No recent studies available  14   Best representative block if additional studies are needed:  A30  15  8th Ed AJCC Tumor Stage:  at least Stage  I -pT2, pNx, [place]'s score  4+3=7      Electronically signed by Yazmin Bingham MD on 5/29/2018 at  8:20 AM

## 2019-01-21 ENCOUNTER — OFFICE VISIT (OUTPATIENT)
Dept: UROLOGY | Facility: AMBULATORY SURGERY CENTER | Age: 69
End: 2019-01-21
Payer: MEDICARE

## 2019-01-21 VITALS
HEART RATE: 66 BPM | HEIGHT: 73 IN | WEIGHT: 194 LBS | SYSTOLIC BLOOD PRESSURE: 132 MMHG | DIASTOLIC BLOOD PRESSURE: 68 MMHG | BODY MASS INDEX: 25.71 KG/M2

## 2019-01-21 DIAGNOSIS — C61 PROSTATE CANCER (HCC): Primary | ICD-10-CM

## 2019-01-21 PROCEDURE — 99214 OFFICE O/P EST MOD 30 MIN: CPT | Performed by: UROLOGY

## 2019-01-22 ENCOUNTER — TELEPHONE (OUTPATIENT)
Dept: UROLOGY | Facility: AMBULATORY SURGERY CENTER | Age: 69
End: 2019-01-22

## 2019-01-22 NOTE — TELEPHONE ENCOUNTER
No additional tests needed at this time  Reviewed results of recent PSA  He will follow up as scheduled with Dr Shai Verdugo on 2/11/19          ----- Message -----   From: Carlos Harmon   Sent: 1/22/2019   9:46 AM   To: [facility]   Subject: Visit Follow-Up Question                         Dr Shai Verdugo,   I have contacted the radiation oncologist since my PSA went up a bit after my most recent blood test  I have an appointment scheduled for January 28  Is there anything else I need to do before this appointment takes place? Thank you       Ezekiel Lopez

## 2019-01-28 ENCOUNTER — RADIATION ONCOLOGY CONSULT (OUTPATIENT)
Dept: RADIATION ONCOLOGY | Facility: HOSPITAL | Age: 69
End: 2019-01-28
Attending: STUDENT IN AN ORGANIZED HEALTH CARE EDUCATION/TRAINING PROGRAM
Payer: MEDICARE

## 2019-01-28 ENCOUNTER — CLINICAL SUPPORT (OUTPATIENT)
Dept: RADIATION ONCOLOGY | Facility: HOSPITAL | Age: 69
End: 2019-01-28
Attending: UROLOGY

## 2019-01-28 VITALS
OXYGEN SATURATION: 96 % | DIASTOLIC BLOOD PRESSURE: 70 MMHG | TEMPERATURE: 97.6 F | BODY MASS INDEX: 25.62 KG/M2 | WEIGHT: 194.2 LBS | SYSTOLIC BLOOD PRESSURE: 136 MMHG | RESPIRATION RATE: 18 BRPM | HEART RATE: 73 BPM

## 2019-01-28 DIAGNOSIS — C61 PROSTATE CANCER (HCC): Primary | ICD-10-CM

## 2019-01-28 DIAGNOSIS — C61 PROSTATE CANCER (HCC): ICD-10-CM

## 2019-01-28 PROCEDURE — 99215 OFFICE O/P EST HI 40 MIN: CPT | Performed by: STUDENT IN AN ORGANIZED HEALTH CARE EDUCATION/TRAINING PROGRAM

## 2019-01-28 NOTE — PROGRESS NOTES
Randi Wells  1950  Mr Rosalva Pickard is a 76 y o  male    Chief Complaint   Patient presents with    Consult     Radiation Oncology       Cancer Staging  No matching staging information was found for the patient  Oncology History    Patient presents today for RT consult for prostate cancer, referred by Dr Jalyn Guadalupe  PSA rising of 0 27 s/p prostatectomy in May 2018  76year old male with elevated PSA in November 2017 at 12 2  His PCP sent him for an MRI of the prostate which also confirmed the presence of a suspicious area in the right peripheral zone  He was referred to Dr Jalyn Guadalupe, Urologist and underwent prostate biopsy on 2/1/18 revealing prostate adenocarcinoma [place]oun 7 (3 + 4 = 7)  Staging CT showed no evidence of metastatic disease  He then underwent DaVinci radical prostatectomy on 5/23/18  PSA history:  8/24/15: 3 55  8/24/17: 12 28  10/12/17: 12 20  7/2/18: 0 05  10/1/18: 0 11  1/7/19: 0 26  1/21/19: 0 27    1/21/19 Urology follow-up  Discussed rising PSA is concerning for a potential island of residual disease  Discussed treatment options including pelvic radiation, will refer to Rad Onc      2/11/19 Urology, Dr Jalyn Guadalupe follow-up          Prostate cancer [facility])    2/2018 Initial Diagnosis     Prostate cancer ([facility] [address] )         2/1/2018 Biopsy     Prostate Biopsy (Dr Jalyn Guadalupe)    A  Prostate, JUAN DANIEL, core needle biopsies:             - Benign prostate glands  - No malignancy is identified, supported on a prostate triple stain, performed on slide A2 with an appropriate control      B  Prostate, LCZ, core needle biopsy:             - Rare atypical prostate glands (less than 5% of the core biopsy), suspicious for prostatic adenocarcinoma, Min score 3 + 3 = 6, Prognostic Grade I              - Focal loss of staining for basal cell markers (p63 and ) is identified, and positive luminal staining for p504s is noted, (prostate triple stain, performed with an appropriate control)      C   Prostate, RCZ, core needle biopsy:             - Prostatic adenocarcinoma, Deerfield score 3 + 4 = 7, Prognostic Grade Grade II, involving approximately 30% of 1 of 2 core biopsies              - Less than 10% Min grade 4 prostatic adenocarcinoma             - High-grade PIN              - Loss of staining for basal cell markers (p63 and ) is identified, and positive luminal staining for p504s is noted, (prostate triple stain, performed with an appropriate control)      D  Prostate, RPZ, core needle biopsies:             - Prostatic adenocarcinoma, Min score 3 + 4 = 7, Prognostic Grade Group II, involving 2 of 4 core biopsies (80%, 75%)  - Approximately 30% Min grade 4 prostatic adenocarcinoma is identified on each core  - Prostatic adenocarcinoma, Deerfield score 3 + 3 = 6, Prognostic Grade Group I, involving less than 5% of 1 of 4 core biopsies              - Loss of staining for basal cell markers (p63 and ) is identified, and positive luminal staining for p504s is noted, (prostate triple stain, performed with an appropriate control)               5/23/2018 Surgery     Prostate, radical prostatectomy: (Dr Jd Walker)  -  Prostatic adenocarcinoma, acinar type, Deerfield grade 4+3=7 (see synoptic report)      1  Specimen identification:       - Procedure:  Radical prostatectomy     - Prostate size and weight:  4 9 x 4 2 x 4 1 cm, 56g     - Lymph node sampling:  Not performed   2   Tumor     - Histologic type:  Acinar adenocarcinoma     - Histologic Grade: Deerfield Pattern:       * primary pattern:  4     * secondary pattern:  3     * tertiary pattern:  N/A     * total Deerfield Score: 7      * Grade Group:  3     * Percentage of pattern 4:60-70%     * Percentage of pattern 5: N/A     * Intraductal carcinoma: Not identified    - Tumor quantitation:  5-10%    - Extraprostatic extension (pT2): Not identified    - Urinary bladder neck invasion:  Not identified    - Seminal vesicle invasion:  Not identified   5  Margins: All surgical resection margins negative for carcinoma   6  Margin positivity in area of extraprostatic extension:  N/A   7  Treatment effect on carcinoma:  No known prior therapy   8  Lymph-vascular invasion:  Not identified (confirmed by immunohistochemical stains performed with appropriate controls on block A31 for CD31 and D2-40)   9  Perineural invasion:  Present   10  Regional lymph nodes (pNX):  No lymph nodes submitted  11  Additional pathologic findings:  atrophy  12  Ancillary studies:  N/A  13  PSA: No recent studies available  14  Best representative block if additional studies are needed:  A30  15  8th Ed AJCC Tumor Stage:  at least Stage  I -pT2, pNx, Effingham's score  4+3=7            Clinical Trial:  No    Screening  Tobacco  Current tobacco user: no  If yes, brief counseling provided: No    Hypertension  Hypertension screening performed: yes  Normotensive:  yes  If no, referred to PCP: no    Depression Screening  Screened for depression using PHQ-2: yes    Screened for depression using PHQ-9:  no  Screening positive or negative:  negative  If score >4, was any of the following actions taken?    Additional evaluation for depression, suicide risk assesment, referral to PCP or psychiatry, medication started:  no    Advanced Care Planning for Patients >65 years  Advanced Care Planning Discussed:  yes  Patient named surrogate decision maker or care plan in chart: no      Health Maintenance   Topic Date Due    Hepatitis C Screening  1950    Depression Screening PHQ  1950    Medicare Annual Wellness Visit (AWV)  1950    CRC Screening: Colonoscopy  1950    Fall Risk  05/07/2015    DTaP,Tdap,and Td Vaccines (2 - Td) 11/29/2022    INFLUENZA VACCINE  Completed    Pneumococcal PPSV23/PCV13 65+ Years / High and Highest Risk  Completed       Patient Active Problem List   Diagnosis    Prostate cancer [facility])     Past Medical History:   Diagnosis Date    Basal cell carcinoma     Hyperlipidemia     Hypertension     PONV (postoperative nausea and vomiting)     Prostate cancer (HCC)     Seizures (HCC)     simple complex partial seizure--last seizure in 2004    Urinary incontinence      Past Surgical History:   Procedure Laterality Date    INGUINAL HERNIA REPAIR      [place] LAP,PROSTATECTOMY,RADICAL,W/NERVE SPARE,INCL ROBOTIC N/A 5/23/2018    Procedure: Flora Sullivan;  Surgeon: Cody Umana MD;  [facility];  Service: Urology    PROSTATE BIOPSY  02/01/2018    Dr Elda Fritz  05/2018     Family History   Problem Relation Age of Onset    No Known Problems Mother     Cancer Father     Cancer Brother      Social History     Social History    Marital status: /Civil Union     Spouse name: N/A    Number of children: N/A    Years of education: N/A     Occupational History    Not on file       Social History Main Topics    Smoking status: Never Smoker    Smokeless tobacco: Never Used    Alcohol use No    Drug use: No    Sexual activity: Not on file     Other Topics Concern    Not on file     Social History Narrative    No narrative on file       Current Outpatient Prescriptions:     aspirin (ASPIRIN LOW DOSE) 81 MG tablet, Take 1 tablet by mouth daily, Disp: , Rfl:     calcium citrate-vitamin D (CITRACAL+D) 315-200 MG-UNIT per tablet, Take by mouth 2 (two) times a day  , Disp: , Rfl:     fexofenadine (ALLEGRA) 180 MG tablet, Take 1 tablet by mouth daily, Disp: , Rfl:     levETIRAcetam (KEPPRA) 1000 MG tablet, Take 1 tablet by mouth 2 (two) times a day, Disp: , Rfl:     lisinopril (ZESTRIL) 5 mg tablet, Take 1 tablet by mouth daily, Disp: , Rfl:     Melatonin 5 MG TABS, Take 5 mg by mouth daily at bedtime  , Disp: , Rfl:     Pediatric Multivitamins-Fl (MULTIVITAMINS/FL PO), Take 1 capsule by mouth daily, Disp: , Rfl:     simvastatin (ZOCOR) 40 mg tablet, Take 1 tablet by mouth, Disp: , Rfl: Allergies   Allergen Reactions    Molds & Smuts      Other reaction(s): Respiratory Distress  Congestion, sore throat, coughing  seasonal    Other      Other reaction(s): Respiratory Distress  Congestion, sore throat, coughing  Review of Systems:  Review of Systems   Constitutional: Negative  HENT: Negative  Eyes: Negative  Respiratory: Negative  Cardiovascular: Negative  Gastrointestinal: Negative  Endocrine: Negative  Genitourinary: Negative  Denies dysuria, hematuria, steady stream, denies nocturia   Musculoskeletal: Negative  Skin: Negative  Allergic/Immunologic: Negative  Neurological: Negative  Hematological: Negative  Psychiatric/Behavioral: Negative  Vitals:    01/28/19 1300   BP: 136/70   BP Location: Right arm   Pulse: 73   Resp: 18   Temp: 97 6 °F (36 4 °C)   TempSrc: Temporal   SpO2: 96%   Weight: 88 1 kg (194 lb 3 2 oz)            Imaging:No results found      Teaching RT education packet provided and discussed

## 2019-01-28 NOTE — PROGRESS NOTES
Consultation - Radiation Oncology     MYD:[id number] : 1950  Encounter: [id number]  Patient Information: [address] COMPLAINT  Chief Complaint   Patient presents with    Consult     Radiation Oncology     Cancer Staging  No matching staging information was found for the patient  History of Present Illness   Jude Lujan is a 76y o  year old male who presents  today for RT consult for prostate cancer, referred by Dr Nick Marrero  PSA rising of 0 27 s/p prostatectomy in May 2018        76year old male with elevated PSA in 2017 at 12 2  His PCP sent him for an MRI of the prostate which also confirmed the presence of a suspicious area in the right peripheral zone  He was referred to Dr Nick Marrero, Urologist and underwent prostate biopsy on 18 revealing prostate adenocarcinoma Min 7 (3 + 4 = 7)  Staging CT showed no evidence of metastatic disease  He then underwent DaVinci radical prostatectomy on 18       PSA history:  8/24/15: 3 55  17: 12 28  10/12/17: 12 20  18: 0 05  10/1/18: 0 11  19: 0 26  19: 0 27     19 Urology follow-up  Discussed rising PSA is concerning for a potential island of residual disease  Discussed treatment options including pelvic radiation, will refer to Rad Onc       19 Urology, Dr Nick Marrero follow-up            Historical Information      Prostate cancer [facility])    2018 Initial Diagnosis     Prostate cancer ([facility] [address] )         2018 Biopsy     Prostate Biopsy (Dr Nick Marrero)    A  Prostate, JUAN DANIEL, core needle biopsies:             - Benign prostate glands  - No malignancy is identified, supported on a prostate triple stain, performed on slide A2 with an appropriate control      B   Prostate, LCZ, core needle biopsy:             - Rare atypical prostate glands (less than 5% of the core biopsy), suspicious for prostatic adenocarcinoma, Min score 3 + 3 = 6, Prognostic Grade I              - Focal loss of staining for basal cell markers (p63 and ) is identified, and positive luminal staining for p504s is noted, (prostate triple stain, performed with an appropriate control)      C  Prostate, RCZ, core needle biopsy:             - Prostatic adenocarcinoma, Min score 3 + 4 = 7, Prognostic Grade Grade II, involving approximately 30% of 1 of 2 core biopsies              - Less than 10% Hope grade 4 prostatic adenocarcinoma             - High-grade PIN              - Loss of staining for basal cell markers (p63 and ) is identified, and positive luminal staining for p504s is noted, (prostate triple stain, performed with an appropriate control)      D  Prostate, RPZ, core needle biopsies:             - Prostatic adenocarcinoma, Min score 3 + 4 = 7, Prognostic Grade Group II, involving 2 of 4 core biopsies (80%, 75%)  - Approximately 30% Min grade 4 prostatic adenocarcinoma is identified on each core  - Prostatic adenocarcinoma, Min score 3 + 3 = 6, Prognostic Grade Group I, involving less than 5% of 1 of 4 core biopsies              - Loss of staining for basal cell markers (p63 and ) is identified, and positive luminal staining for p504s is noted, (prostate triple stain, performed with an appropriate control)               5/23/2018 Surgery     Prostate, radical prostatectomy: (Dr Blanca Elder)  -  Prostatic adenocarcinoma, acinar type, Min grade 4+3=7 (see synoptic report)      1  Specimen identification:       - Procedure:  Radical prostatectomy     - Prostate size and weight:  4 9 x 4 2 x 4 1 cm, 56g     - Lymph node sampling:  Not performed   2   Tumor     - Histologic type:  Acinar adenocarcinoma     - Histologic Grade: Hope Pattern:       * primary pattern:  4     * secondary pattern:  3     * tertiary pattern:  N/A     * total Min Score: 7      * Grade Group:  3     * Percentage of pattern 4:60-70%     * Percentage of pattern 5: N/A     * Intraductal carcinoma: Not identified    - Tumor quantitation:  5-10%    - Extraprostatic extension (pT2): Not identified    - Urinary bladder neck invasion:  Not identified    - Seminal vesicle invasion:  Not identified   5  Margins: All surgical resection margins negative for carcinoma   6  Margin positivity in area of extraprostatic extension:  N/A   7  Treatment effect on carcinoma:  No known prior therapy   8  Lymph-vascular invasion:  Not identified (confirmed by immunohistochemical stains performed with appropriate controls on block A31 for CD31 and D2-40)   9  Perineural invasion:  Present   10  Regional lymph nodes (pNX):  No lymph nodes submitted  11  Additional pathologic findings:  atrophy  12  Ancillary studies:  N/A  13  PSA: No recent studies available  14   Best representative block if additional studies are needed:  A30  15  8th Ed AJCC Tumor Stage:  at least Stage  I -pT2, pNx, Min's score  4+3=7              Past Medical History:   Diagnosis Date    Basal cell carcinoma     Hyperlipidemia     Hypertension     PONV (postoperative nausea and vomiting)     Prostate cancer (HCC)     Seizures (HCC)     simple complex partial seizure--last seizure in 2004    Urinary incontinence      Past Surgical History:   Procedure Laterality Date    INGUINAL HERNIA REPAIR      [place] LAP,PROSTATECTOMY,RADICAL,W/NERVE SPARE,INCL ROBOTIC N/A 5/23/2018    Procedure: Federico Mass;  Surgeon: Kaity Blackman MD;  [facility];  Service: Urology    PROSTATE BIOPSY  02/01/2018    Dr Mei Davis  05/2018       Family History   Problem Relation Age of Onset    No Known Problems Mother     Cancer Father     Cancer Brother        Social History   History   Alcohol Use No     History   Drug Use No     History   Smoking Status    Never Smoker   Smokeless Tobacco    Never Used         Meds/Allergies     Current Outpatient Prescriptions:     aspirin (ASPIRIN LOW DOSE) 81 MG tablet, Take 1 tablet by mouth daily, Disp: , Rfl:     calcium citrate-vitamin D (CITRACAL+D) 315-200 MG-UNIT per tablet, Take by mouth 2 (two) times a day  , Disp: , Rfl:     fexofenadine (ALLEGRA) 180 MG tablet, Take 1 tablet by mouth daily, Disp: , Rfl:     levETIRAcetam (KEPPRA) 1000 MG tablet, Take 1 tablet by mouth 2 (two) times a day, Disp: , Rfl:     lisinopril (ZESTRIL) 5 mg tablet, Take 1 tablet by mouth daily, Disp: , Rfl:     Melatonin 5 MG TABS, Take 5 mg by mouth daily at bedtime  , Disp: , Rfl:     Pediatric Multivitamins-Fl (MULTIVITAMINS/FL PO), Take 1 capsule by mouth daily, Disp: , Rfl:     simvastatin (ZOCOR) 40 mg tablet, Take 1 tablet by mouth, Disp: , Rfl:   Allergies   Allergen Reactions    Molds & Smuts      Other reaction(s): Respiratory Distress  Congestion, sore throat, coughing  seasonal    Other      Other reaction(s): Respiratory Distress  Congestion, sore throat, coughing  Clinical Trial:  No     Screening  Tobacco  Current tobacco user: no  If yes, brief counseling provided: No     Hypertension  Hypertension screening performed: yes  Normotensive:  yes  If no, referred to PCP: no     Depression Screening  Screened for depression using PHQ-2: yes     Screened for depression using PHQ-9:  no  Screening positive or negative:  negative  If score >4, was any of the following actions taken? Additional evaluation for depression, suicide risk assesment, referral to PCP or psychiatry, medication started:  no     Advanced Care Planning for Patients >65 years  Advanced Care Planning Discussed:  yes  Patient named surrogate decision maker or care plan in chart: no      Review of Systems Constitutional: Negative  HENT: Negative  Eyes: Negative  Respiratory: Negative  Cardiovascular: Negative  Gastrointestinal: Negative  Endocrine: Negative  Genitourinary: Negative  Denies dysuria, hematuria, steady stream, denies nocturia   Musculoskeletal: Negative      Skin: Negative  Allergic/Immunologic: Negative  Neurological: Negative  Hematological: Negative  Psychiatric/Behavioral: Negative  OBJECTIVE:   /70 (BP Location: Right arm)   Pulse 73   Temp 97 6 °F (36 4 °C) (Temporal)   Resp 18   Wt 88 1 kg (194 lb 3 2 oz)   SpO2 96%   BMI 25 62 kg/m²   Pain Assessment:  0  Performance Status: ECOG/Zubrod/WHO: 0 - Asymptomatic    Physical Exam GENERAL:  Appears stated age, in no apparent distress  Alert and oriented  HEENT:  Normocephalic, atraumatic   extraocular muscles intact  Oral mucosa moist   PULMONARY:  Respirations unlabored  CARDIOVASCULAR:  Regular rate  ABDOMEN:  Soft, nondistended  NEUROLOGIC: Moving all extremities, No focal deficits noted  EXTREMITIES: no clubbing, cyanosis, or edema  PSYCHIATRIC: normal mood and affect  Appropriate thought content and judgement  RESULTS  Lab Results  No results found for: PSA      Imaging Studies  No results found  Pathology: as above, GS 4+3=7, negative margins, ELLIE -, SVI -, PNI+      ASSESSMENT  1  Prostate cancer ([facility] [address] )  NM PET CT skull base to mid thigh     Cancer Staging  No matching staging information was found for the patient  Risk Category: recurrent  Bone Scan: no  Androgen Deprivation Therapy: potentially      PLAN/DISCUSSION  Orders Placed This Encounter   Procedures    NM PET CT skull base to mid thigh          Kyaw Navarrete is a 76y o  year old male with pretreatment PSA of 12 2, pT2Nx, GS 4+3=7, adenocarcinoma of the prostate, status post radical prostatectomy 5/23/18, with post treatment PSA 0 05, now rising to 0 27  We discussed the pathology results from radical prostatectomy and explained that per NCCN guidelines, patient meets criteria for PSA persistence/recurrence as his PSA should have dropped to undetectable after radical prostatectomy  Given he now has rising PSA, I explained that NCCN guidelines recommend proceeding with imaging for restaging    If imaging is negative for distant metastatic disease, recommendations will be for EBRT +/- ADT  If there is distant metastatic disease, he will definitely need ADT, and whether he needs EBRT in that scenario is questionable  Patient will plan to discuss ADT with Dr Peggy Sanabria at scheduled follow up  I explained the logistics of treatment in the setting of negative AXUMIN PET scan, explained treatment would be with full bladder to decrease risk of radiation enteritis and diarrhea  We discussed  side effects of treatment include but are not limited to fatigue, abdominal cramping, diarrhea, cystitis, hematuria, urinary frequency/urgency, nocturia, proctitis, and impotence  After this discussion we agreed to the following:     PLAN:  Final recommendations pending further workup with AXUMIN PET scan  Patient and his wife were in good understanding of these recommendations  All of their questions were answered to their apparent satisfaction  Thank you for allowing us to participate in the care of Mr Chapo Mcgrath  Jan aTlbot MD  1/28/2019,2:44 PM      Portions of the record may have been created with voice recognition software   Occasional wrong word or "sound a like" substitutions may have occurred due to the inherent limitations of voice recognition software   Read the chart carefully and recognize, using context, where substitutions have occurred

## 2019-02-06 ENCOUNTER — HOSPITAL ENCOUNTER (OUTPATIENT)
Dept: RADIOLOGY | Age: 69
Discharge: HOME | End: 2019-02-06
Payer: MEDICARE

## 2019-02-06 DIAGNOSIS — C61 PROSTATE CANCER (HCC): ICD-10-CM

## 2019-02-06 PROCEDURE — A9588 FLUCICLOVINE F-18: HCPCS

## 2019-02-06 PROCEDURE — 78815 PET IMAGE W/CT SKULL-THIGH: CPT

## 2019-02-08 NOTE — PROGRESS NOTES
2/11/2019    Valery Rodríguez  1950  [id number]    Discussion and Plan    PET scan confirms the absence of metastatic disease  We discussed the given the slow level of PSA recurrence it is unlikely that imageable findings would be noted  We therefore again reviewed the prospect of pelvic radiation with or without short-term hormone deprivation  Specific risks of hormone deprivation therapy reviewed and Lupron Depot injection provided today without incident  He will follow up with radiation oncology as scheduled  Return in 3 months with PSA prior to visit  All questions answered at this time  1  Prostate cancer ([facility] [address] )  - PSA Total, Diagnostic; Future  - leuprolide (LUPRON DEPOT 6 MONTH KIT) IM injection kit 45 mg    Assessment      Patient Active Problem List   Diagnosis    Prostate cancer [facility])       History of Present Illness    Lobo Jacinto is a 76 y o  male seen today in regards to a history of  Gl 7 T2c prostate cancer s/p DaVinci prostatectomy 5/23/18 and ED presents today for follow up  Ayse Braun reports occasional and rare episodes of urinary stress incontinence   He does not require use of sanitary pad or diaper  PSA unfortunately has become detectable at 0 26  I again reviewed his prior pathology which had demonstrated [place] 7 staged T2 prostate cancer with all margins negative  Repeat PSA confirms this finding  He has since been seen by radiation oncology    PET scan is otherwise negative for metastatic dissemination      Urinary Symptom Assessment        Past Medical History  Past Medical History:   Diagnosis Date    Basal cell carcinoma     Hyperlipidemia     Hypertension     PONV (postoperative nausea and vomiting)     Prostate cancer (HCC)     Seizures (HCC)     simple complex partial seizure--last seizure in 2004    Urinary incontinence        Past Social History  Past Surgical History:   Procedure Laterality Date    INGUINAL HERNIA REPAIR      [place] LAP,PROSTATECTOMY,RADICAL,W/NERVE VEGA,CLARICE ROBOTIC N/A 5/23/2018    Procedure: Onofre Mei;  Surgeon: Evelyn Patel MD;  [facility];  Service: Urology    PROSTATE BIOPSY  02/01/2018    Dr Adriane Dia  05/2018       Past Family History  Family History   Problem Relation Age of Onset    No Known Problems Mother     Cancer Father     Cancer Brother        Past Social history  Social History     Socioeconomic History    Marital status: /Civil Union     Spouse name: Not on file    Number of children: Not on file    Years of education: Not on file    Highest education level: Not on file   Occupational History    Not on file   Social Needs    Financial resource strain: Not on file    Food insecurity:     Worry: Not on file     Inability: Not on file    Transportation needs:     Medical: Not on file     Non-medical: Not on file   Tobacco Use    Smoking status: Never Smoker    Smokeless tobacco: Never Used   Substance and Sexual Activity    Alcohol use: No    Drug use: No    Sexual activity: Not on file   Lifestyle    Physical activity:     Days per week: Not on file     Minutes per session: Not on file    Stress: Not on file   Relationships    Social connections:     Talks on phone: Not on file     Gets together: Not on file     Attends Restorationist service: Not on file     Active member of club or organization: Not on file     Attends meetings of clubs or organizations: Not on file     Relationship status: Not on file    Intimate partner violence:     Fear of current or ex partner: Not on file     Emotionally abused: Not on file     Physically abused: Not on file     Forced sexual activity: Not on file   Other Topics Concern    Not on file   Social History Narrative    Not on file       Current Medications  Current Outpatient Medications   Medication Sig Dispense Refill    aspirin (ASPIRIN LOW DOSE) 81 MG tablet Take 1 tablet by mouth daily      calcium citrate-vitamin D (CITRACAL+D) 315-200 MG-UNIT per tablet Take by mouth 2 (two) times a day        fexofenadine (ALLEGRA) 180 MG tablet Take 1 tablet by mouth daily      levETIRAcetam (KEPPRA) 1000 MG tablet Take 1 tablet by mouth 2 (two) times a day      lisinopril (ZESTRIL) 5 mg tablet Take 1 tablet by mouth daily      Melatonin 5 MG TABS Take 5 mg by mouth daily at bedtime        Pediatric Multivitamins-Fl (MULTIVITAMINS/FL PO) Take 1 capsule by mouth daily      simvastatin (ZOCOR) 40 mg tablet Take 1 tablet by mouth       No current facility-administered medications for this visit  Allergies  Allergies   Allergen Reactions    Molds & Smuts      Other reaction(s): Respiratory Distress  Congestion, sore throat, coughing  seasonal    Other      Other reaction(s): Respiratory Distress  Congestion, sore throat, coughing  Past Medical History, Social History, Family History, medications and allergies were reviewed  Review of Systems  Review of Systems   Constitutional: Negative  HENT: Negative  Eyes: Negative  Respiratory: Negative  Cardiovascular: Negative  Gastrointestinal: Negative  Endocrine: Negative  Genitourinary: Negative for decreased urine volume, difficulty urinating, hematuria and urgency  Musculoskeletal: Negative  Skin: Negative  Neurological: Negative  Hematological: Negative  Psychiatric/Behavioral: Negative  Vitals  Vitals:    02/11/19 1057   BP: 126/60   BP Location: Left arm   Patient Position: Sitting   Cuff Size: Standard   Pulse: 56   Weight: 87 1 kg (192 lb)   Height: 6' 1" (1 854 m)         Physical Exam    Physical Exam   Constitutional: He is oriented to person, place, and time  He appears well-developed and well-nourished  HENT:   Head: Normocephalic and atraumatic  Eyes: Pupils are equal, round, and reactive to light  Neck: Normal range of motion  Cardiovascular: Normal rate, regular rhythm and normal heart sounds  Pulmonary/Chest: Effort normal and breath sounds normal  No accessory muscle usage  No respiratory distress  Abdominal: Soft  Normal appearance and bowel sounds are normal  There is no tenderness  Musculoskeletal: Normal range of motion  Neurological: He is alert and oriented to person, place, and time  Skin: Skin is warm, dry and intact  Psychiatric: He has a normal mood and affect  His speech is normal  Cognition and memory are normal    Nursing note and vitals reviewed  Results    Below listed labs, pathology results, and radiology images were personally reviewed:    No results found for: PSA  Lab Results   Component Value Date    GLUCOSE 96 02/19/2015    CALCIUM 8 5 05/24/2018     02/19/2015    K 4 7 05/24/2018    CO2 27 05/24/2018     (H) 05/24/2018    BUN 10 05/24/2018    CREATININE 0 97 05/24/2018     Lab Results   Component Value Date    WBC 7 18 05/24/2018    HGB 12 7 05/24/2018    HCT 37 2 05/24/2018    MCV 93 05/24/2018     (L) 05/24/2018       No results found for this or any previous visit (from the past 1 hour(s)) ]    -PSA   Ref Range & Units 1/21/19 10:09 AM   PSA, Total <4 00 ng/mL 0 27      AXUMIN PET/CT SCAN     INDICATION:  History of prostate cancer  Prior prostatectomy 5/23/2018  Rising PSA  C61: Malignant neoplasm of prostate     MODIFIER: PS          COMPARISON:  CT abdomen pelvis 2/21/2018     CELL TYPE:  prostatic adenocarcinoma, Min=7; 2/1/18 Prostate     TECHNIQUE: 10 3 mCi F-18 Axumin administered IV  Multiplanar attenuation corrected and non-attenuation corrected PET images are available for interpretation, and contiguous, low dose, axial CT sections were obtained from the skull vertex through the   femurs  Intravenous contrast material was not utilized  FINDINGS:       BRAIN:      No acute abnormalities are seen  HEAD/NECK:  There is a physiologic distribution of the radiotracer    Normal salivary gland uptake is demonstrated      CT images:  Unremarkable       CHEST:   There is a physiologic distribution of the radiotracer      CT images: 5 mm nodule noted in the right upper lobe laterally, subpleural location, image 123 series 3  No focal radiotracer uptake here but this may be too small to characterize  Moderate coronary artery calcifications  ABDOMEN:  There is a physiologic distribution of the radiotracer      CT images: Unremarkable  PELVIS:  There is a physiologic distribution of the radiotracer       No suspicious focal radiotracer uptake at the prostate bed  No radiotracer avid lymph nodes  CT images: Surgical clips in the left inguinal region  Tiny fat-containing right inguinal hernia  OSSEOUS STRUCTURES:   There is a physiologic distribution of the radiotracer  CT images: No significant findings  IMPRESSION:     1  No focal radiotracer uptake suspicious for disease recurrence  2   5 mm pulmonary nodule in the right upper lobe  No focal radiotracer uptake here but this may be too small to characterize    Based on current Fleischner Society 2017 Guidelines on incidental pulmonary nodule, patients with a known malignancy are at   increased risk of metastasis and should receive initial three month follow-up chest CT      The study was marked in EPIC for significant notification

## 2019-02-11 ENCOUNTER — OFFICE VISIT (OUTPATIENT)
Dept: UROLOGY | Facility: AMBULATORY SURGERY CENTER | Age: 69
End: 2019-02-11
Payer: MEDICARE

## 2019-02-11 VITALS
WEIGHT: 192 LBS | BODY MASS INDEX: 25.45 KG/M2 | DIASTOLIC BLOOD PRESSURE: 60 MMHG | HEIGHT: 73 IN | HEART RATE: 56 BPM | SYSTOLIC BLOOD PRESSURE: 126 MMHG

## 2019-02-11 DIAGNOSIS — C61 PROSTATE CANCER (HCC): Primary | ICD-10-CM

## 2019-02-11 PROCEDURE — 96402 CHEMO HORMON ANTINEOPL SQ/IM: CPT

## 2019-02-11 PROCEDURE — 99215 OFFICE O/P EST HI 40 MIN: CPT | Performed by: UROLOGY

## 2019-02-12 ENCOUNTER — RADIATION ONCOLOGY FOLLOW-UP (OUTPATIENT)
Dept: RADIATION ONCOLOGY | Facility: HOSPITAL | Age: 69
End: 2019-02-12
Attending: STUDENT IN AN ORGANIZED HEALTH CARE EDUCATION/TRAINING PROGRAM
Payer: MEDICARE

## 2019-02-12 ENCOUNTER — CLINICAL SUPPORT (OUTPATIENT)
Dept: RADIATION ONCOLOGY | Facility: HOSPITAL | Age: 69
End: 2019-02-12
Attending: UROLOGY

## 2019-02-12 VITALS
TEMPERATURE: 97.7 F | SYSTOLIC BLOOD PRESSURE: 124 MMHG | OXYGEN SATURATION: 98 % | RESPIRATION RATE: 18 BRPM | HEART RATE: 74 BPM | DIASTOLIC BLOOD PRESSURE: 78 MMHG

## 2019-02-12 DIAGNOSIS — C61 PROSTATE CANCER (HCC): Primary | ICD-10-CM

## 2019-02-12 PROCEDURE — 99214 OFFICE O/P EST MOD 30 MIN: CPT | Performed by: STUDENT IN AN ORGANIZED HEALTH CARE EDUCATION/TRAINING PROGRAM

## 2019-02-12 NOTE — PROGRESS NOTES
Follow-up - Radiation Oncology   Margarito Nelson 1950 76 y o  male [id number]      History of Present Illness   Cancer Staging  No matching staging information was found for the patient  Margarito Nelson is a 76y o  year old male with pretreatment PSA of 12 2, pT2Nx, GS 4+3=7, adenocarcinoma of the prostate, status post radical prostatectomy 5/23/18, with post treatment PSA 0 05, now rising to 0 27  Patient returns post PET/CT      Initial consult with Dr Britta Soto on 1/28/19       76year old male with pretreatment PSA of 12 2, pT2Nx, GS 4+3=7, adenocarcinoma of the prostate, status post radical prostatectomy 5/23/18, with post treatment PSA 0 05, now rising to 0 27      PSA history:  8/24/15: 3 55  8/24/17: 12 28  10/12/17: 12 20  7/2/18: 0 05  10/1/18: 0 11  1/7/19: 0 26  1/21/19: 0 27     Given he now has rising PSA, recommend proceeding with imaging for restaging   If imaging is negative for distant metastatic disease, recommendations will be for EBRT +/- ADT   If there is distant metastatic disease, he will definitely need ADT, and whether he needs EBRT in that scenario is questionable   Patient will plan to discuss ADT with Dr Vika Temple at scheduled follow up      2/6/19 PET/CT   IMPRESSION:   1  No focal radiotracer uptake suspicious for disease recurrence  2   5 mm pulmonary nodule in the right upper lobe  No focal radiotracer uptake here but this may be too small to characterize   Based on current Fleischner Society 2017 Guidelines on incidental pulmonary nodule, patients with a known malignancy are at   increased risk of metastasis and should receive initial three month follow-up chest CT      2/11/19 Urology, Dr Vika Temple follow-up  Reviewed PET scan results, confirming no evidence of metastatic disease  First Lupron injection administered     Plan: Follow-up with Rad Onc as scheduled, return to urology in 3 months with repeat PSA       5/14/19 Urology follow-up           Historical Information Prostate cancer ([facility] [address] )    2/2018 Initial Diagnosis     [facility]         2/1/2018 Biopsy     Prostate Biopsy (Dr Blanca Elder)    A  Prostate, JUAN DANIEL, core needle biopsies:             - Benign prostate glands  - No malignancy is identified, supported on a prostate triple stain, performed on slide A2 with an appropriate control      B  Prostate, LCZ, core needle biopsy:             - Rare atypical prostate glands (less than 5% of the core biopsy), suspicious for prostatic adenocarcinoma, Min score 3 + 3 = 6, Prognostic Grade I              - Focal loss of staining for basal cell markers (p63 and ) is identified, and positive luminal staining for p504s is noted, (prostate triple stain, performed with an appropriate control)      C  Prostate, RCZ, core needle biopsy:             - Prostatic adenocarcinoma, [place] score 3 + 4 = 7, Prognostic Grade Grade II, involving approximately 30% of 1 of 2 core biopsies              - Less than 10% [place] grade 4 prostatic adenocarcinoma             - High-grade PIN              - Loss of staining for basal cell markers (p63 and ) is identified, and positive luminal staining for p504s is noted, (prostate triple stain, performed with an appropriate control)      D  Prostate, RPZ, core needle biopsies:             - Prostatic adenocarcinoma, [place] score 3 + 4 = 7, Prognostic Grade Group II, involving 2 of 4 core biopsies (80%, 75%)  - Approximately 30% Min grade 4 prostatic adenocarcinoma is identified on each core               - Prostatic adenocarcinoma, Min score 3 + 3 = 6, Prognostic Grade Group I, involving less than 5% of 1 of 4 core biopsies              - Loss of staining for basal cell markers (p63 and ) is identified, and positive luminal staining for p504s is noted, (prostate triple stain, performed with an appropriate control)               5/23/2018 Surgery     Prostate, radical prostatectomy: (Dr Blanca Elder)  - Prostatic adenocarcinoma, acinar type, [place] grade 4+3=7 (see synoptic report)      1  Specimen identification:       - Procedure:  Radical prostatectomy     - Prostate size and weight:  4 9 x 4 2 x 4 1 cm, 56g     - Lymph node sampling:  Not performed   2  Tumor     - Histologic type:  Acinar adenocarcinoma     - Histologic Grade: Min Pattern:       * primary pattern:  4     * secondary pattern:  3     * tertiary pattern:  N/A     * total [place] Score: 7      * Grade Group:  3     * Percentage of pattern 4:60-70%     * Percentage of pattern 5: N/A     * Intraductal carcinoma: Not identified    - Tumor quantitation:  5-10%    - Extraprostatic extension (pT2): Not identified    - Urinary bladder neck invasion:  Not identified    - Seminal vesicle invasion:  Not identified   5  Margins: All surgical resection margins negative for carcinoma   6  Margin positivity in area of extraprostatic extension:  N/A   7  Treatment effect on carcinoma:  No known prior therapy   8  Lymph-vascular invasion:  Not identified (confirmed by immunohistochemical stains performed with appropriate controls on block A31 for CD31 and D2-40)   9  Perineural invasion:  Present   10  Regional lymph nodes (pNX):  No lymph nodes submitted  11  Additional pathologic findings:  atrophy  12  Ancillary studies:  N/A  13  PSA: No recent studies available  14   Best representative block if additional studies are needed:  A30  15  8th Ed AJCC Tumor Stage:  at least Stage  I -pT2, pNx, [place]'s score  4+3=7         2/11/2019 -  Hormone Therapy     Lupron Depot IM injection             Past Medical History:   Diagnosis Date    Basal cell carcinoma     Hyperlipidemia     Hypertension     PONV (postoperative nausea and vomiting)     Prostate cancer (HCC)     Seizures (HCC)     simple complex partial seizure--last seizure in 2004    Urinary incontinence      Past Surgical History:   Procedure Laterality Date    INGUINAL HERNIA REPAIR      [place] LAP,PROSTATECTOMY,RADICAL,W/NERVE SPARE,INCL ROBOTIC N/A 5/23/2018    Procedure: Gongora Pavithra ROBOTICS;  Surgeon: Jean Perdomo MD;  [facility];  Service: Urology    PROSTATE BIOPSY  02/01/2018    Dr Oskar Shah  05/2018       Social History   Social History     Substance and Sexual Activity   Alcohol Use No     Social History     Substance and Sexual Activity   Drug Use No     Social History     Tobacco Use   Smoking Status Never Smoker   Smokeless Tobacco Never Used         Meds/Allergies     Current Outpatient Medications:     aspirin (ASPIRIN LOW DOSE) 81 MG tablet, Take 1 tablet by mouth daily, Disp: , Rfl:     calcium citrate-vitamin D (CITRACAL+D) 315-200 MG-UNIT per tablet, Take by mouth 2 (two) times a day  , Disp: , Rfl:     fexofenadine (ALLEGRA) 180 MG tablet, Take 1 tablet by mouth daily, Disp: , Rfl:     levETIRAcetam (KEPPRA) 1000 MG tablet, Take 1 tablet by mouth 2 (two) times a day, Disp: , Rfl:     lisinopril (ZESTRIL) 5 mg tablet, Take 1 tablet by mouth daily, Disp: , Rfl:     Melatonin 5 MG TABS, Take 5 mg by mouth daily at bedtime  , Disp: , Rfl:     Pediatric Multivitamins-Fl (MULTIVITAMINS/FL PO), Take 1 capsule by mouth daily, Disp: , Rfl:     simvastatin (ZOCOR) 40 mg tablet, Take 1 tablet by mouth, Disp: , Rfl:   Allergies   Allergen Reactions    Molds & Smuts      Other reaction(s): Respiratory Distress  Congestion, sore throat, coughing  seasonal    Other      Other reaction(s): Respiratory Distress  Congestion, sore throat, coughing            Review of Systems Constitutional: Negative     HENT: Negative     Eyes: Negative     Respiratory: Negative     Cardiovascular: Negative     Gastrointestinal: Negative     Endocrine: Negative     Genitourinary: Negative          Denies dysuria, hematuria, steady stream, denies nocturia   Musculoskeletal: Negative     Skin: Negative     Allergic/Immunologic: Negative     Neurological: Negative     Hematological: Negative     Psychiatric/Behavioral: Negative  OBJECTIVE:   /78 (BP Location: Right arm)   Pulse 74   Temp 97 7 °F (36 5 °C) (Temporal)   Resp 18   SpO2 98%   Pain Assessment:  0  ECOG/Zubrod/WHO: 0 - Asymptomatic    Physical Exam GENERAL:  Appears stated age, in no apparent distress  Alert and oriented  HEENT:  Normocephalic, atraumatic   extraocular muscles intact  Oral mucosa moist   PULMONARY:  Respirations unlabored  CARDIOVASCULAR:  Regular rate  NEUROLOGIC: Moving all extremities, No focal deficits noted  EXTREMITIES: no clubbing, cyanosis, or edema  PSYCHIATRIC: normal mood and affect  Appropriate thought content and judgement  RESULTS    Lab Results: No results found for this or any previous visit (from the past 672 hour(s))  Imaging Studies:Nm Pet Ct Skull Base To Mid Thigh    Result Date: 2/6/2019  Narrative: AXUMIN PET/CT SCAN  INDICATION:  History of prostate cancer  Prior prostatectomy 5/23/2018  Rising PSA  C61: Malignant neoplasm of prostate  MODIFIER: PS      COMPARISON:  CT abdomen pelvis 2/21/2018  CELL TYPE:  prostatic adenocarcinoma, [place]=7; 2/1/18 Prostate  TECHNIQUE: 10 3 mCi F-18 Axumin administered IV  Multiplanar attenuation corrected and non-attenuation corrected PET images are available for interpretation, and contiguous, low dose, axial CT sections were obtained from the skull vertex through the femurs  Intravenous contrast material was not utilized  FINDINGS:   BRAIN:    No acute abnormalities are seen  HEAD/NECK:  There is a physiologic distribution of the radiotracer  Normal salivary gland uptake is demonstrated  CT images:  Unremarkable  CHEST:   There is a physiologic distribution of the radiotracer  CT images: 5 mm nodule noted in the right upper lobe laterally, subpleural location, image 123 series 3  No focal radiotracer uptake here but this may be too small to characterize    Moderate coronary artery calcifications  ABDOMEN:  There is a physiologic distribution of the radiotracer  CT images: Unremarkable  PELVIS:  There is a physiologic distribution of the radiotracer  No suspicious focal radiotracer uptake at the prostate bed  No radiotracer avid lymph nodes  CT images: Surgical clips in the left inguinal region  Tiny fat-containing right inguinal hernia  OSSEOUS STRUCTURES:   There is a physiologic distribution of the radiotracer  CT images: No significant findings  Impression:  1  No focal radiotracer uptake suspicious for disease recurrence  2   5 mm pulmonary nodule in the right upper lobe  No focal radiotracer uptake here but this may be too small to characterize  Based on current Fleischner Society 2017 Guidelines on incidental pulmonary nodule, patients with a known malignancy are at increased risk of metastasis and should receive initial three month follow-up chest CT  The study was marked in EPIC for significant notification  Workstation performed: [id number]           Assessment/Plan:  No orders of the defined types were placed in this encounter  Lani Pagan is a 76y o  year old male with with pretreatment PSA of 12 2, pT2Nx, GS 4+3=7, adenocarcinoma of the prostate, status post radical prostatectomy 5/23/18, with post treatment PSA 0 05, now rising to 0 27  We discussed the results of the PET scan which are negative for evidence of metastases  He will require follow up CT chest in 3 months for new lung nodule  At this time, Patient has received his lupron shot  We discussed that we will plan to have him return in 7 weeks for CT simulation  I explained the logistics of treatment, including   treatment with full bladder to decrease risk of radiation enteritis and diarrhea    We discussed  side effects of treatment include but are not limited to fatigue, abdominal cramping, diarrhea, cystitis, hematuria, urinary frequency/urgency, nocturia, proctitis, and impotence  Informed consent was obtained with plan for patient to return for CT simulation  Recommendations will be for 66 6Gy in 37 fractions, with initial fields to include whole pelvis  Patient and his wife were in good understanding of these recommendations and all of their questions were answered to their apparent Siva Ferrell MD  2/12/2019,11:33 AM    Portions of the record may have been created with voice recognition software   Occasional wrong word or "sound a like" substitutions may have occurred due to the inherent limitations of voice recognition software   Read the chart carefully and recognize, using context, where substitutions have occurred

## 2019-02-12 NOTE — PROGRESS NOTES
Tyra Valentin  1950   Mr Lily Eric is a 76 y o  male       Chief Complaint   Patient presents with    Follow-up     Radiation Oncology       Cancer Staging  No matching staging information was found for the patient  Oncology History    Patient returns post PET/CT  Initial consult with Dr Mary Ellen Thomas on 1/28/19      76year old male with pretreatment PSA of 12 2, pT2Nx, GS 4+3=7, adenocarcinoma of the prostate, status post radical prostatectomy 5/23/18, with post treatment PSA 0 05, now rising to 0 27  PSA history:  8/24/15: 3 55  8/24/17: 12 28  10/12/17: 12 20  7/2/18: 0 05  10/1/18: 0 11  1/7/19: 0 26  1/21/19: 0 27     Given he now has rising PSA, recommend proceeding with imaging for restaging  If imaging is negative for distant metastatic disease, recommendations will be for EBRT +/- ADT  If there is distant metastatic disease, he will definitely need ADT, and whether he needs EBRT in that scenario is questionable  Patient will plan to discuss ADT with Dr Pam Herrmann at scheduled follow up  2/6/19 PET/CT   IMPRESSION:   1  No focal radiotracer uptake suspicious for disease recurrence  2   5 mm pulmonary nodule in the right upper lobe  No focal radiotracer uptake here but this may be too small to characterize  Based on current Fleischner Society 2017 Guidelines on incidental pulmonary nodule, patients with a known malignancy are at   increased risk of metastasis and should receive initial three month follow-up chest CT      2/11/19 Urology, Dr Pam Herrmann follow-up  Reviewed PET scan results, confirming no evidence of metastatic disease  First Lupron injection administered  Plan: Follow-up with Rad Onc as scheduled, return to urology in 3 months with repeat PSA      5/14/19 Urology follow-up          Prostate cancer [facility])    2/2018 Initial Diagnosis     Prostate cancer ([address] )         2/1/2018 Biopsy     Prostate Biopsy (Dr Pam Herrmann)    A   Prostate, JUAN DANIEL, core needle biopsies:             - Benign prostate glands  - No malignancy is identified, supported on a prostate triple stain, performed on slide A2 with an appropriate control      B  Prostate, LCZ, core needle biopsy:             - Rare atypical prostate glands (less than 5% of the core biopsy), suspicious for prostatic adenocarcinoma, [place] score 3 + 3 = 6, Prognostic Grade I              - Focal loss of staining for basal cell markers (p63 and ) is identified, and positive luminal staining for p504s is noted, (prostate triple stain, performed with an appropriate control)      C  Prostate, RCZ, core needle biopsy:             - Prostatic adenocarcinoma, [place] score 3 + 4 = 7, Prognostic Grade Grade II, involving approximately 30% of 1 of 2 core biopsies              - Less than 10% E[place]ille grade 4 prostatic adenocarcinoma             - High-grade PIN              - Loss of staining for basal cell markers (p63 and ) is identified, and positive luminal staining for p504s is noted, (prostate triple stain, performed with an appropriate control)      D  Prostate, RPZ, core needle biopsies:             - Prostatic adenocarcinoma, [place] score 3 + 4 = 7, Prognostic Grade Group II, involving 2 of 4 core biopsies (80%, 75%)  - Approximately 30% Min grade 4 prostatic adenocarcinoma is identified on each core  - Prostatic adenocarcinoma, Min score 3 + 3 = 6, Prognostic Grade Group I, involving less than 5% of 1 of 4 core biopsies              - Loss of staining for basal cell markers (p63 and ) is identified, and positive luminal staining for p504s is noted, (prostate triple stain, performed with an appropriate control)               5/23/2018 Surgery     Prostate, radical prostatectomy: (Dr Blanca Elder)  -  Prostatic adenocarcinoma, acinar type, Erieville grade 4+3=7 (see synoptic report)      1   Specimen identification:       - Procedure:  Radical prostatectomy     - Prostate size and weight:  4 9 x 4 2 x 4 1 cm, 56g     - Lymph node sampling:  Not performed   2  Tumor     - Histologic type:  Acinar adenocarcinoma     - Histologic Grade: [place] Pattern:       * primary pattern:  4     * secondary pattern:  3     * tertiary pattern:  N/A     * total Min Score: 7      * Grade Group:  3     * Percentage of pattern 4:60-70%     * Percentage of pattern 5: N/A     * Intraductal carcinoma: Not identified    - Tumor quantitation:  5-10%    - Extraprostatic extension (pT2): Not identified    - Urinary bladder neck invasion:  Not identified    - Seminal vesicle invasion:  Not identified   5  Margins: All surgical resection margins negative for carcinoma   6  Margin positivity in area of extraprostatic extension:  N/A   7  Treatment effect on carcinoma:  No known prior therapy   8  Lymph-vascular invasion:  Not identified (confirmed by immunohistochemical stains performed with appropriate controls on block A31 for CD31 and D2-40)   9  Perineural invasion:  Present   10  Regional lymph nodes (pNX):  No lymph nodes submitted  11  Additional pathologic findings:  atrophy  12  Ancillary studies:  N/A  13  PSA: No recent studies available  14  Best representative block if additional studies are needed:  A30  15  8th Ed AJCC Tumor Stage:  at least Stage  I -pT2, pNx, [place]'s score  4+3=7         2/11/2019 -  Hormone Therapy     Lupron Depot IM injection             Clinical Trial: No    Screening  Tobacco  Current tobacco user: no  If yes, brief counseling provided: No    Hypertension  Hypertension screening performed: yes  Normotensive:  yes  If no, referred to PCP: no    Depression Screening  Screened for depression using PHQ-2: yes    Screened for depression using PHQ-9:  no  Screening positive or negative:  negative  If score >4, was any of the following actions taken?    Additional evaluation for depression, suicide risk assesment, referral to PCP or psychiatry, medication started:  no    Advanced Care Planning for Patients >65 years  Advanced Care Planning Discussed:  yes  Patient named surrogate decision maker or care plan in chart: no    Health Maintenance   Topic Date Due    Hepatitis C Screening  1950    Depression Screening PHQ  1950    Medicare Annual Wellness Visit (AWV)  1950    CRC Screening: Colonoscopy  1950    BMI: Followup Plan  05/07/1968    Fall Risk  05/07/2015    BMI: Adult  02/11/2020    DTaP,Tdap,and Td Vaccines (2 - Td) 11/29/2022    INFLUENZA VACCINE  Completed    Pneumococcal PPSV23/PCV13 65+ Years / High and Highest Risk  Completed    HEPATITIS B VACCINES  Aged Out       Patient Active Problem List   Diagnosis    Prostate cancer ([facility] [address] )     Past Medical History:   Diagnosis Date    Basal cell carcinoma     Hyperlipidemia     Hypertension     PONV (postoperative nausea and vomiting)     Prostate cancer ([facility] [address]     Seizures ([facility] [address] )     simple complex partial seizure--last seizure in 2004    Urinary incontinence      Past Surgical History:   Procedure Laterality Date    INGUINAL HERNIA REPAIR      DE LAP,PROSTATECTOMY,RADICAL,W/NERVE SPARE,INCL ROBOTIC N/A 5/23/2018    Procedure: PROSTATECTOMY RADICAL W ROBOTICS;  Surgeon: Toyin Harvey MD;  Location: [facility];  Service: Urology    PROSTATE BIOPSY  02/01/2018    Dr You Pisano  05/2018     Family History   Problem Relation Age of Onset    No Known Problems Mother     Cancer Father     Cancer Brother      Social History     Socioeconomic History    Marital status: /Civil Union     Spouse name: Not on file    Number of children: Not on file    Years of education: Not on file    Highest education level: Not on file   Occupational History    Not on file   Social Needs    Financial resource strain: Not on file    Food insecurity:     Worry: Not on file     Inability: Not on file    Transportation needs:     Medical: Not on file     Non-medical: Not on file   Tobacco Use    Smoking status: Never Smoker    Smokeless tobacco: Never Used   Substance and Sexual Activity    Alcohol use: No    Drug use: No    Sexual activity: Not on file   Lifestyle    Physical activity:     Days per week: Not on file     Minutes per session: Not on file    Stress: Not on file   Relationships    Social connections:     Talks on phone: Not on file     Gets together: Not on file     Attends Mosque service: Not on file     Active member of club or organization: Not on file     Attends meetings of clubs or organizations: Not on file     Relationship status: Not on file    Intimate partner violence:     Fear of current or ex partner: Not on file     Emotionally abused: Not on file     Physically abused: Not on file     Forced sexual activity: Not on file   Other Topics Concern    Not on file   Social History Narrative    Not on file       Current Outpatient Medications:     aspirin (ASPIRIN LOW DOSE) 81 MG tablet, Take 1 tablet by mouth daily, Disp: , Rfl:     calcium citrate-vitamin D (CITRACAL+D) 315-200 MG-UNIT per tablet, Take by mouth 2 (two) times a day  , Disp: , Rfl:     fexofenadine (ALLEGRA) 180 MG tablet, Take 1 tablet by mouth daily, Disp: , Rfl:     levETIRAcetam (KEPPRA) 1000 MG tablet, Take 1 tablet by mouth 2 (two) times a day, Disp: , Rfl:     lisinopril (ZESTRIL) 5 mg tablet, Take 1 tablet by mouth daily, Disp: , Rfl:     Melatonin 5 MG TABS, Take 5 mg by mouth daily at bedtime  , Disp: , Rfl:     Pediatric Multivitamins-Fl (MULTIVITAMINS/FL PO), Take 1 capsule by mouth daily, Disp: , Rfl:     simvastatin (ZOCOR) 40 mg tablet, Take 1 tablet by mouth, Disp: , Rfl:   No current facility-administered medications for this visit  Allergies   Allergen Reactions    Molds & Smuts      Other reaction(s): Respiratory Distress  Congestion, sore throat, coughing  seasonal    Other      Other reaction(s): Respiratory Distress  Congestion, sore throat, coughing          Review of Systems:  Review of Systems   Constitutional: Negative  HENT: Negative  Eyes: Negative  Respiratory: Negative  Cardiovascular: Negative  Gastrointestinal: Negative  Endocrine: Negative  Genitourinary: Negative  Musculoskeletal: Negative  Skin: Negative  Allergic/Immunologic: Negative  Neurological: Negative  Hematological: Negative  Psychiatric/Behavioral: Negative  Vitals:    02/12/19 0900   BP: 124/78   BP Location: Right arm   Pulse: 74   Resp: 18   Temp: 97 7 °F (36 5 °C)   TempSrc: Temporal   SpO2: 98%            Imaging:Nm Pet Ct Skull Base To Mid Thigh    Result Date: 2/6/2019  Narrative: [facility] PET/CT SCAN  INDICATION:  History of prostate cancer  Prior prostatectomy 5/23/2018  Rising PSA  C61: Malignant neoplasm of prostate  MODIFIER: PS      COMPARISON:  CT abdomen pelvis 2/21/2018  CELL TYPE:  prostatic adenocarcinoma, [place]=7; 2/1/18 Prostate  TECHNIQUE: 10 3 mCi F-18 Axumin administered IV  Multiplanar attenuation corrected and non-attenuation corrected PET images are available for interpretation, and contiguous, low dose, axial CT sections were obtained from the skull vertex through the femurs  Intravenous contrast material was not utilized  FINDINGS:   BRAIN:    No acute abnormalities are seen  HEAD/NECK:  There is a physiologic distribution of the radiotracer  Normal salivary gland uptake is demonstrated  CT images:  Unremarkable  CHEST:   There is a physiologic distribution of the radiotracer  CT images: 5 mm nodule noted in the right upper lobe laterally, subpleural location, image 123 series 3  No focal radiotracer uptake here but this may be too small to characterize  Moderate coronary artery calcifications  ABDOMEN:  There is a physiologic distribution of the radiotracer  CT images: Unremarkable  PELVIS:  There is a physiologic distribution of the radiotracer  No suspicious focal radiotracer uptake at the prostate bed    No radiotracer avid lymph nodes  CT images: Surgical clips in the left inguinal region  Tiny fat-containing right inguinal hernia  OSSEOUS STRUCTURES:   There is a physiologic distribution of the radiotracer  CT images: No significant findings  Impression:  1  No focal radiotracer uptake suspicious for disease recurrence  2   5 mm pulmonary nodule in the right upper lobe  No focal radiotracer uptake here but this may be too small to characterize  Based on current Fleischner Society 2017 Guidelines on incidental pulmonary nodule, patients with a known malignancy are at increased risk of metastasis and should receive initial three month follow-up chest CT  The study was marked in EPIC for significant notification   Workstation performed: [id number]

## 2019-04-02 ENCOUNTER — APPOINTMENT (OUTPATIENT)
Dept: RADIATION ONCOLOGY | Facility: HOSPITAL | Age: 69
End: 2019-04-02
Attending: STUDENT IN AN ORGANIZED HEALTH CARE EDUCATION/TRAINING PROGRAM
Payer: MEDICARE

## 2019-04-02 PROCEDURE — 77334 RADIATION TREATMENT AID(S): CPT | Performed by: STUDENT IN AN ORGANIZED HEALTH CARE EDUCATION/TRAINING PROGRAM

## 2019-04-04 ENCOUNTER — APPOINTMENT (OUTPATIENT)
Dept: URGENT CARE | Age: 69
End: 2019-04-04
Payer: OTHER MISCELLANEOUS

## 2019-04-04 PROCEDURE — G0382 LEV 3 HOSP TYPE B ED VISIT: HCPCS | Performed by: PREVENTIVE MEDICINE

## 2019-04-04 PROCEDURE — 99213 OFFICE O/P EST LOW 20 MIN: CPT | Performed by: PREVENTIVE MEDICINE

## 2019-04-04 PROCEDURE — 99283 EMERGENCY DEPT VISIT LOW MDM: CPT | Performed by: PREVENTIVE MEDICINE

## 2019-04-10 ENCOUNTER — APPOINTMENT (OUTPATIENT)
Dept: RADIATION ONCOLOGY | Facility: HOSPITAL | Age: 69
End: 2019-04-10
Attending: STUDENT IN AN ORGANIZED HEALTH CARE EDUCATION/TRAINING PROGRAM
Payer: MEDICARE

## 2019-04-10 PROCEDURE — 77338 DESIGN MLC DEVICE FOR IMRT: CPT | Performed by: STUDENT IN AN ORGANIZED HEALTH CARE EDUCATION/TRAINING PROGRAM

## 2019-04-10 PROCEDURE — 77300 RADIATION THERAPY DOSE PLAN: CPT | Performed by: STUDENT IN AN ORGANIZED HEALTH CARE EDUCATION/TRAINING PROGRAM

## 2019-04-10 PROCEDURE — 77301 RADIOTHERAPY DOSE PLAN IMRT: CPT | Performed by: STUDENT IN AN ORGANIZED HEALTH CARE EDUCATION/TRAINING PROGRAM

## 2019-04-15 PROCEDURE — 77417 THER RADIOLOGY PORT IMAGE(S): CPT | Performed by: STUDENT IN AN ORGANIZED HEALTH CARE EDUCATION/TRAINING PROGRAM

## 2019-04-16 ENCOUNTER — APPOINTMENT (OUTPATIENT)
Dept: RADIATION ONCOLOGY | Facility: HOSPITAL | Age: 69
End: 2019-04-16
Attending: STUDENT IN AN ORGANIZED HEALTH CARE EDUCATION/TRAINING PROGRAM
Payer: MEDICARE

## 2019-04-16 ENCOUNTER — APPOINTMENT (OUTPATIENT)
Dept: URGENT CARE | Age: 69
End: 2019-04-16

## 2019-04-16 PROCEDURE — 77385 HB NTSTY MODUL RAD TX DLVR SMPL: CPT | Performed by: STUDENT IN AN ORGANIZED HEALTH CARE EDUCATION/TRAINING PROGRAM

## 2019-04-17 ENCOUNTER — APPOINTMENT (OUTPATIENT)
Dept: RADIATION ONCOLOGY | Facility: HOSPITAL | Age: 69
End: 2019-04-17
Attending: STUDENT IN AN ORGANIZED HEALTH CARE EDUCATION/TRAINING PROGRAM
Payer: MEDICARE

## 2019-04-17 PROCEDURE — 77385 HB NTSTY MODUL RAD TX DLVR SMPL: CPT | Performed by: STUDENT IN AN ORGANIZED HEALTH CARE EDUCATION/TRAINING PROGRAM

## 2019-04-18 ENCOUNTER — APPOINTMENT (OUTPATIENT)
Dept: RADIATION ONCOLOGY | Facility: HOSPITAL | Age: 69
End: 2019-04-18
Attending: STUDENT IN AN ORGANIZED HEALTH CARE EDUCATION/TRAINING PROGRAM
Payer: MEDICARE

## 2019-04-18 PROCEDURE — 77385 HB NTSTY MODUL RAD TX DLVR SMPL: CPT | Performed by: STUDENT IN AN ORGANIZED HEALTH CARE EDUCATION/TRAINING PROGRAM

## 2019-04-19 ENCOUNTER — APPOINTMENT (OUTPATIENT)
Dept: RADIATION ONCOLOGY | Facility: HOSPITAL | Age: 69
End: 2019-04-19
Attending: STUDENT IN AN ORGANIZED HEALTH CARE EDUCATION/TRAINING PROGRAM
Payer: MEDICARE

## 2019-04-19 PROCEDURE — 77385 HB NTSTY MODUL RAD TX DLVR SMPL: CPT | Performed by: STUDENT IN AN ORGANIZED HEALTH CARE EDUCATION/TRAINING PROGRAM

## 2019-04-22 ENCOUNTER — APPOINTMENT (OUTPATIENT)
Dept: RADIATION ONCOLOGY | Facility: HOSPITAL | Age: 69
End: 2019-04-22
Attending: STUDENT IN AN ORGANIZED HEALTH CARE EDUCATION/TRAINING PROGRAM
Payer: MEDICARE

## 2019-04-22 PROCEDURE — 77385 HB NTSTY MODUL RAD TX DLVR SMPL: CPT | Performed by: STUDENT IN AN ORGANIZED HEALTH CARE EDUCATION/TRAINING PROGRAM

## 2019-04-22 PROCEDURE — 77336 RADIATION PHYSICS CONSULT: CPT | Performed by: STUDENT IN AN ORGANIZED HEALTH CARE EDUCATION/TRAINING PROGRAM

## 2019-04-23 ENCOUNTER — APPOINTMENT (OUTPATIENT)
Dept: RADIATION ONCOLOGY | Facility: HOSPITAL | Age: 69
End: 2019-04-23
Attending: STUDENT IN AN ORGANIZED HEALTH CARE EDUCATION/TRAINING PROGRAM
Payer: MEDICARE

## 2019-04-23 PROCEDURE — 77385 HB NTSTY MODUL RAD TX DLVR SMPL: CPT | Performed by: STUDENT IN AN ORGANIZED HEALTH CARE EDUCATION/TRAINING PROGRAM

## 2019-04-24 ENCOUNTER — APPOINTMENT (OUTPATIENT)
Dept: RADIATION ONCOLOGY | Facility: HOSPITAL | Age: 69
End: 2019-04-24
Attending: STUDENT IN AN ORGANIZED HEALTH CARE EDUCATION/TRAINING PROGRAM
Payer: MEDICARE

## 2019-04-24 PROCEDURE — 77385 HB NTSTY MODUL RAD TX DLVR SMPL: CPT | Performed by: STUDENT IN AN ORGANIZED HEALTH CARE EDUCATION/TRAINING PROGRAM

## 2019-04-25 ENCOUNTER — APPOINTMENT (OUTPATIENT)
Dept: RADIATION ONCOLOGY | Facility: HOSPITAL | Age: 69
End: 2019-04-25
Attending: STUDENT IN AN ORGANIZED HEALTH CARE EDUCATION/TRAINING PROGRAM
Payer: MEDICARE

## 2019-04-25 PROCEDURE — 77385 HB NTSTY MODUL RAD TX DLVR SMPL: CPT | Performed by: STUDENT IN AN ORGANIZED HEALTH CARE EDUCATION/TRAINING PROGRAM

## 2019-04-26 ENCOUNTER — APPOINTMENT (OUTPATIENT)
Dept: RADIATION ONCOLOGY | Facility: HOSPITAL | Age: 69
End: 2019-04-26
Attending: STUDENT IN AN ORGANIZED HEALTH CARE EDUCATION/TRAINING PROGRAM
Payer: MEDICARE

## 2019-04-26 PROCEDURE — 77385 HB NTSTY MODUL RAD TX DLVR SMPL: CPT | Performed by: STUDENT IN AN ORGANIZED HEALTH CARE EDUCATION/TRAINING PROGRAM

## 2019-04-29 ENCOUNTER — APPOINTMENT (OUTPATIENT)
Dept: RADIATION ONCOLOGY | Facility: HOSPITAL | Age: 69
End: 2019-04-29
Attending: STUDENT IN AN ORGANIZED HEALTH CARE EDUCATION/TRAINING PROGRAM
Payer: MEDICARE

## 2019-04-29 PROCEDURE — 77385 HB NTSTY MODUL RAD TX DLVR SMPL: CPT | Performed by: STUDENT IN AN ORGANIZED HEALTH CARE EDUCATION/TRAINING PROGRAM

## 2019-04-29 PROCEDURE — 77336 RADIATION PHYSICS CONSULT: CPT | Performed by: STUDENT IN AN ORGANIZED HEALTH CARE EDUCATION/TRAINING PROGRAM

## 2019-04-30 ENCOUNTER — APPOINTMENT (OUTPATIENT)
Dept: RADIATION ONCOLOGY | Facility: HOSPITAL | Age: 69
End: 2019-04-30
Attending: STUDENT IN AN ORGANIZED HEALTH CARE EDUCATION/TRAINING PROGRAM
Payer: MEDICARE

## 2019-04-30 PROCEDURE — 77385 HB NTSTY MODUL RAD TX DLVR SMPL: CPT | Performed by: STUDENT IN AN ORGANIZED HEALTH CARE EDUCATION/TRAINING PROGRAM

## 2019-05-01 ENCOUNTER — APPOINTMENT (OUTPATIENT)
Dept: RADIATION ONCOLOGY | Facility: HOSPITAL | Age: 69
End: 2019-05-01
Attending: STUDENT IN AN ORGANIZED HEALTH CARE EDUCATION/TRAINING PROGRAM
Payer: MEDICARE

## 2019-05-01 PROCEDURE — 77385 HB NTSTY MODUL RAD TX DLVR SMPL: CPT | Performed by: STUDENT IN AN ORGANIZED HEALTH CARE EDUCATION/TRAINING PROGRAM

## 2019-05-02 ENCOUNTER — APPOINTMENT (OUTPATIENT)
Dept: RADIATION ONCOLOGY | Facility: HOSPITAL | Age: 69
End: 2019-05-02
Attending: STUDENT IN AN ORGANIZED HEALTH CARE EDUCATION/TRAINING PROGRAM
Payer: MEDICARE

## 2019-05-02 PROCEDURE — 77385 HB NTSTY MODUL RAD TX DLVR SMPL: CPT | Performed by: STUDENT IN AN ORGANIZED HEALTH CARE EDUCATION/TRAINING PROGRAM

## 2019-05-03 ENCOUNTER — APPOINTMENT (OUTPATIENT)
Dept: RADIATION ONCOLOGY | Facility: HOSPITAL | Age: 69
End: 2019-05-03
Attending: STUDENT IN AN ORGANIZED HEALTH CARE EDUCATION/TRAINING PROGRAM
Payer: MEDICARE

## 2019-05-03 PROCEDURE — 77385 HB NTSTY MODUL RAD TX DLVR SMPL: CPT | Performed by: STUDENT IN AN ORGANIZED HEALTH CARE EDUCATION/TRAINING PROGRAM

## 2019-05-06 ENCOUNTER — APPOINTMENT (OUTPATIENT)
Dept: RADIATION ONCOLOGY | Facility: HOSPITAL | Age: 69
End: 2019-05-06
Attending: STUDENT IN AN ORGANIZED HEALTH CARE EDUCATION/TRAINING PROGRAM
Payer: MEDICARE

## 2019-05-06 PROCEDURE — 77336 RADIATION PHYSICS CONSULT: CPT | Performed by: STUDENT IN AN ORGANIZED HEALTH CARE EDUCATION/TRAINING PROGRAM

## 2019-05-06 PROCEDURE — 77385 HB NTSTY MODUL RAD TX DLVR SMPL: CPT | Performed by: STUDENT IN AN ORGANIZED HEALTH CARE EDUCATION/TRAINING PROGRAM

## 2019-05-07 ENCOUNTER — APPOINTMENT (OUTPATIENT)
Dept: RADIATION ONCOLOGY | Facility: HOSPITAL | Age: 69
End: 2019-05-07
Attending: STUDENT IN AN ORGANIZED HEALTH CARE EDUCATION/TRAINING PROGRAM
Payer: MEDICARE

## 2019-05-07 PROCEDURE — 77385 HB NTSTY MODUL RAD TX DLVR SMPL: CPT | Performed by: STUDENT IN AN ORGANIZED HEALTH CARE EDUCATION/TRAINING PROGRAM

## 2019-05-07 PROCEDURE — 77338 DESIGN MLC DEVICE FOR IMRT: CPT | Performed by: STUDENT IN AN ORGANIZED HEALTH CARE EDUCATION/TRAINING PROGRAM

## 2019-05-07 PROCEDURE — 77300 RADIATION THERAPY DOSE PLAN: CPT | Performed by: STUDENT IN AN ORGANIZED HEALTH CARE EDUCATION/TRAINING PROGRAM

## 2019-05-08 ENCOUNTER — APPOINTMENT (OUTPATIENT)
Dept: RADIATION ONCOLOGY | Facility: HOSPITAL | Age: 69
End: 2019-05-08
Attending: STUDENT IN AN ORGANIZED HEALTH CARE EDUCATION/TRAINING PROGRAM
Payer: MEDICARE

## 2019-05-08 PROCEDURE — 77385 HB NTSTY MODUL RAD TX DLVR SMPL: CPT | Performed by: STUDENT IN AN ORGANIZED HEALTH CARE EDUCATION/TRAINING PROGRAM

## 2019-05-09 ENCOUNTER — APPOINTMENT (OUTPATIENT)
Dept: RADIATION ONCOLOGY | Facility: HOSPITAL | Age: 69
End: 2019-05-09
Attending: STUDENT IN AN ORGANIZED HEALTH CARE EDUCATION/TRAINING PROGRAM
Payer: MEDICARE

## 2019-05-09 PROCEDURE — 77385 HB NTSTY MODUL RAD TX DLVR SMPL: CPT | Performed by: STUDENT IN AN ORGANIZED HEALTH CARE EDUCATION/TRAINING PROGRAM

## 2019-05-10 ENCOUNTER — APPOINTMENT (OUTPATIENT)
Dept: RADIATION ONCOLOGY | Facility: HOSPITAL | Age: 69
End: 2019-05-10
Attending: STUDENT IN AN ORGANIZED HEALTH CARE EDUCATION/TRAINING PROGRAM
Payer: MEDICARE

## 2019-05-10 PROCEDURE — 77385 HB NTSTY MODUL RAD TX DLVR SMPL: CPT | Performed by: STUDENT IN AN ORGANIZED HEALTH CARE EDUCATION/TRAINING PROGRAM

## 2019-05-14 ENCOUNTER — APPOINTMENT (OUTPATIENT)
Dept: RADIATION ONCOLOGY | Facility: HOSPITAL | Age: 69
End: 2019-05-14
Attending: STUDENT IN AN ORGANIZED HEALTH CARE EDUCATION/TRAINING PROGRAM
Payer: MEDICARE

## 2019-05-14 PROCEDURE — 77336 RADIATION PHYSICS CONSULT: CPT | Performed by: STUDENT IN AN ORGANIZED HEALTH CARE EDUCATION/TRAINING PROGRAM

## 2019-05-14 PROCEDURE — 77385 HB NTSTY MODUL RAD TX DLVR SMPL: CPT | Performed by: STUDENT IN AN ORGANIZED HEALTH CARE EDUCATION/TRAINING PROGRAM

## 2019-05-15 ENCOUNTER — APPOINTMENT (OUTPATIENT)
Dept: RADIATION ONCOLOGY | Facility: HOSPITAL | Age: 69
End: 2019-05-15
Attending: STUDENT IN AN ORGANIZED HEALTH CARE EDUCATION/TRAINING PROGRAM
Payer: MEDICARE

## 2019-05-15 PROCEDURE — 77385 HB NTSTY MODUL RAD TX DLVR SMPL: CPT | Performed by: STUDENT IN AN ORGANIZED HEALTH CARE EDUCATION/TRAINING PROGRAM

## 2019-05-16 ENCOUNTER — APPOINTMENT (OUTPATIENT)
Dept: RADIATION ONCOLOGY | Facility: HOSPITAL | Age: 69
End: 2019-05-16
Attending: STUDENT IN AN ORGANIZED HEALTH CARE EDUCATION/TRAINING PROGRAM
Payer: MEDICARE

## 2019-05-16 PROCEDURE — 77385 HB NTSTY MODUL RAD TX DLVR SMPL: CPT | Performed by: STUDENT IN AN ORGANIZED HEALTH CARE EDUCATION/TRAINING PROGRAM

## 2019-05-17 ENCOUNTER — APPOINTMENT (OUTPATIENT)
Dept: RADIATION ONCOLOGY | Facility: HOSPITAL | Age: 69
End: 2019-05-17
Attending: STUDENT IN AN ORGANIZED HEALTH CARE EDUCATION/TRAINING PROGRAM
Payer: MEDICARE

## 2019-05-17 PROCEDURE — 77385 HB NTSTY MODUL RAD TX DLVR SMPL: CPT | Performed by: STUDENT IN AN ORGANIZED HEALTH CARE EDUCATION/TRAINING PROGRAM

## 2019-05-20 ENCOUNTER — APPOINTMENT (OUTPATIENT)
Dept: RADIATION ONCOLOGY | Facility: HOSPITAL | Age: 69
End: 2019-05-20
Attending: STUDENT IN AN ORGANIZED HEALTH CARE EDUCATION/TRAINING PROGRAM
Payer: MEDICARE

## 2019-05-20 PROCEDURE — 77385 HB NTSTY MODUL RAD TX DLVR SMPL: CPT | Performed by: STUDENT IN AN ORGANIZED HEALTH CARE EDUCATION/TRAINING PROGRAM

## 2019-05-21 ENCOUNTER — APPOINTMENT (OUTPATIENT)
Dept: RADIATION ONCOLOGY | Facility: HOSPITAL | Age: 69
End: 2019-05-21
Attending: STUDENT IN AN ORGANIZED HEALTH CARE EDUCATION/TRAINING PROGRAM
Payer: MEDICARE

## 2019-05-21 PROCEDURE — 77385 HB NTSTY MODUL RAD TX DLVR SMPL: CPT | Performed by: STUDENT IN AN ORGANIZED HEALTH CARE EDUCATION/TRAINING PROGRAM

## 2019-05-21 PROCEDURE — 77336 RADIATION PHYSICS CONSULT: CPT | Performed by: STUDENT IN AN ORGANIZED HEALTH CARE EDUCATION/TRAINING PROGRAM

## 2019-05-22 ENCOUNTER — APPOINTMENT (OUTPATIENT)
Dept: RADIATION ONCOLOGY | Facility: HOSPITAL | Age: 69
End: 2019-05-22
Attending: STUDENT IN AN ORGANIZED HEALTH CARE EDUCATION/TRAINING PROGRAM
Payer: MEDICARE

## 2019-05-22 PROCEDURE — 77385 HB NTSTY MODUL RAD TX DLVR SMPL: CPT | Performed by: STUDENT IN AN ORGANIZED HEALTH CARE EDUCATION/TRAINING PROGRAM

## 2019-05-23 ENCOUNTER — APPOINTMENT (OUTPATIENT)
Dept: RADIATION ONCOLOGY | Facility: HOSPITAL | Age: 69
End: 2019-05-23
Attending: STUDENT IN AN ORGANIZED HEALTH CARE EDUCATION/TRAINING PROGRAM
Payer: MEDICARE

## 2019-05-23 PROCEDURE — 77385 HB NTSTY MODUL RAD TX DLVR SMPL: CPT | Performed by: STUDENT IN AN ORGANIZED HEALTH CARE EDUCATION/TRAINING PROGRAM

## 2019-05-24 ENCOUNTER — APPOINTMENT (OUTPATIENT)
Dept: RADIATION ONCOLOGY | Facility: HOSPITAL | Age: 69
End: 2019-05-24
Attending: STUDENT IN AN ORGANIZED HEALTH CARE EDUCATION/TRAINING PROGRAM
Payer: MEDICARE

## 2019-05-24 PROCEDURE — 77385 HB NTSTY MODUL RAD TX DLVR SMPL: CPT | Performed by: STUDENT IN AN ORGANIZED HEALTH CARE EDUCATION/TRAINING PROGRAM

## 2019-05-28 ENCOUNTER — APPOINTMENT (OUTPATIENT)
Dept: RADIATION ONCOLOGY | Facility: HOSPITAL | Age: 69
End: 2019-05-28
Attending: STUDENT IN AN ORGANIZED HEALTH CARE EDUCATION/TRAINING PROGRAM
Payer: MEDICARE

## 2019-05-28 PROCEDURE — 77385 HB NTSTY MODUL RAD TX DLVR SMPL: CPT | Performed by: STUDENT IN AN ORGANIZED HEALTH CARE EDUCATION/TRAINING PROGRAM

## 2019-05-29 ENCOUNTER — APPOINTMENT (OUTPATIENT)
Dept: RADIATION ONCOLOGY | Facility: HOSPITAL | Age: 69
End: 2019-05-29
Attending: STUDENT IN AN ORGANIZED HEALTH CARE EDUCATION/TRAINING PROGRAM
Payer: MEDICARE

## 2019-05-29 PROCEDURE — 77385 HB NTSTY MODUL RAD TX DLVR SMPL: CPT | Performed by: STUDENT IN AN ORGANIZED HEALTH CARE EDUCATION/TRAINING PROGRAM

## 2019-05-29 PROCEDURE — 77336 RADIATION PHYSICS CONSULT: CPT | Performed by: STUDENT IN AN ORGANIZED HEALTH CARE EDUCATION/TRAINING PROGRAM

## 2019-05-30 ENCOUNTER — APPOINTMENT (OUTPATIENT)
Dept: RADIATION ONCOLOGY | Facility: HOSPITAL | Age: 69
End: 2019-05-30
Attending: STUDENT IN AN ORGANIZED HEALTH CARE EDUCATION/TRAINING PROGRAM
Payer: MEDICARE

## 2019-05-30 PROCEDURE — 77385 HB NTSTY MODUL RAD TX DLVR SMPL: CPT | Performed by: STUDENT IN AN ORGANIZED HEALTH CARE EDUCATION/TRAINING PROGRAM

## 2019-05-31 PROCEDURE — 77385 HB NTSTY MODUL RAD TX DLVR SMPL: CPT | Performed by: STUDENT IN AN ORGANIZED HEALTH CARE EDUCATION/TRAINING PROGRAM

## 2019-06-03 ENCOUNTER — TELEPHONE (OUTPATIENT)
Dept: UROLOGY | Facility: CLINIC | Age: 69
End: 2019-06-03

## 2019-06-03 ENCOUNTER — APPOINTMENT (OUTPATIENT)
Dept: RADIATION ONCOLOGY | Facility: HOSPITAL | Age: 69
End: 2019-06-03
Attending: STUDENT IN AN ORGANIZED HEALTH CARE EDUCATION/TRAINING PROGRAM
Payer: MEDICARE

## 2019-06-03 PROCEDURE — 77385 HB NTSTY MODUL RAD TX DLVR SMPL: CPT | Performed by: STUDENT IN AN ORGANIZED HEALTH CARE EDUCATION/TRAINING PROGRAM

## 2019-06-04 ENCOUNTER — APPOINTMENT (OUTPATIENT)
Dept: RADIATION ONCOLOGY | Facility: HOSPITAL | Age: 69
End: 2019-06-04
Attending: STUDENT IN AN ORGANIZED HEALTH CARE EDUCATION/TRAINING PROGRAM
Payer: MEDICARE

## 2019-06-04 PROCEDURE — 77385 HB NTSTY MODUL RAD TX DLVR SMPL: CPT | Performed by: STUDENT IN AN ORGANIZED HEALTH CARE EDUCATION/TRAINING PROGRAM

## 2019-06-05 ENCOUNTER — APPOINTMENT (OUTPATIENT)
Dept: RADIATION ONCOLOGY | Facility: HOSPITAL | Age: 69
End: 2019-06-05
Attending: STUDENT IN AN ORGANIZED HEALTH CARE EDUCATION/TRAINING PROGRAM
Payer: MEDICARE

## 2019-06-05 PROCEDURE — 77336 RADIATION PHYSICS CONSULT: CPT | Performed by: STUDENT IN AN ORGANIZED HEALTH CARE EDUCATION/TRAINING PROGRAM

## 2019-06-05 PROCEDURE — 77385 HB NTSTY MODUL RAD TX DLVR SMPL: CPT | Performed by: STUDENT IN AN ORGANIZED HEALTH CARE EDUCATION/TRAINING PROGRAM

## 2019-06-06 ENCOUNTER — APPOINTMENT (OUTPATIENT)
Dept: RADIATION ONCOLOGY | Facility: HOSPITAL | Age: 69
End: 2019-06-06
Attending: STUDENT IN AN ORGANIZED HEALTH CARE EDUCATION/TRAINING PROGRAM
Payer: MEDICARE

## 2019-06-06 PROCEDURE — 77385 HB NTSTY MODUL RAD TX DLVR SMPL: CPT | Performed by: STUDENT IN AN ORGANIZED HEALTH CARE EDUCATION/TRAINING PROGRAM

## 2019-06-07 ENCOUNTER — APPOINTMENT (OUTPATIENT)
Dept: RADIATION ONCOLOGY | Facility: HOSPITAL | Age: 69
End: 2019-06-07
Attending: STUDENT IN AN ORGANIZED HEALTH CARE EDUCATION/TRAINING PROGRAM
Payer: MEDICARE

## 2019-06-07 PROCEDURE — 77385 HB NTSTY MODUL RAD TX DLVR SMPL: CPT | Performed by: STUDENT IN AN ORGANIZED HEALTH CARE EDUCATION/TRAINING PROGRAM

## 2019-07-16 ENCOUNTER — OFFICE VISIT (OUTPATIENT)
Dept: PODIATRY | Facility: CLINIC | Age: 69
End: 2019-07-16
Payer: MEDICARE

## 2019-07-16 ENCOUNTER — APPOINTMENT (OUTPATIENT)
Dept: LAB | Facility: HOSPITAL | Age: 69
End: 2019-07-16
Attending: PODIATRIST
Payer: MEDICARE

## 2019-07-16 VITALS
BODY MASS INDEX: 25.58 KG/M2 | DIASTOLIC BLOOD PRESSURE: 73 MMHG | SYSTOLIC BLOOD PRESSURE: 125 MMHG | HEART RATE: 70 BPM | HEIGHT: 73 IN | WEIGHT: 193 LBS

## 2019-07-16 DIAGNOSIS — L60.0 INGROWING NAIL: Primary | ICD-10-CM

## 2019-07-16 DIAGNOSIS — B35.1 TINEA UNGUIUM: ICD-10-CM

## 2019-07-16 PROCEDURE — 11730 AVULSION NAIL PLATE SIMPLE 1: CPT | Performed by: PODIATRIST

## 2019-07-16 PROCEDURE — 99203 OFFICE O/P NEW LOW 30 MIN: CPT | Performed by: PODIATRIST

## 2019-07-16 NOTE — PATIENT INSTRUCTIONS
Ingrown Nail   WHAT YOU NEED TO KNOW:   What is an ingrown nail? An ingrown nail is when the edge of your fingernail or toenail grows into the skin next to it  The most common cause is when nails are trimmed too short  What increases my risk for an ingrown nail? · Shoes, socks, or panty hose that are too tight or do not fit well    · Trauma or injury to your nail    · Nails that are naturally more curved, thick, or covered with skin    · A fungal infection    · Feet that sweat a lot or poor hygiene (do not wash feet regularly)  What are the signs and symptoms of an ingrown nail? · Painful, red, and swollen skin around the edge or corner of your nail    · Sharp pain when you walk    · Pus or liquid drainage from the nail  How is an ingrown nail treated? · Medicines:      ¨ Acetaminophen  decreases pain and can be bought without a doctor's order  Ask how much to take and how often to take it  Follow directions  Acetaminophen can cause liver damage if not taken correctly  ¨ NSAIDs , such as ibuprofen, help decrease swelling, pain, and fever  This medicine is available with or without a doctor's order  NSAIDs can cause stomach bleeding or kidney problems in certain people  If you take blood thinner medicine, always ask your healthcare provider if NSAIDs are safe for you  Always read the medicine label and follow directions  ¨ Antibiotics  help treat or prevent a bacterial infection  They may be given as an ointment, pill, or both  · Partial nail avulsion  is a procedure used to remove the part of the nail that has grown into your skin  A liquid solution or electric charge may be applied to your nail  This keeps your nail from growing into the skin again  · Matricectomy  is a procedure where part of your nail matrix is destroyed so that a small section of your nail stops growing  Your nail matrix is the area that your nail grows from  It is the pale or white color at the base of your nail   Most of the matrix cannot be seen because it lies underneath the skin  A chemical, laser, or instrument may be used to destroy the nail matrix  How can I manage my symptoms? · Soak and lift the nail  Soak your ingrown nail in warm water for 20 minutes, 2 to 3 times each day  Then gently lift the edge of the ingrown nail away from the skin  Wedge a small piece of cotton or gauze under the corner of the nail  You can also put dental floss under the nail to lift the edge away from the skin  This may help keep the nail from growing into the skin  · Keep your nails clean and dry  Wash your hands and feet with soap and water  Pat dry with a clean towel  Dry in between each toe  Do not put lotion between your toes  How can I help prevent an ingrown nail? · Carefully trim your nails  Cut your nails straight across  Do not cut them too short  Lightly file the nail corners if you have sharp edges  Do not round your nails  Do not rip or tear off the tips of your nails  This may cause your nail edge to grow into the skin  Use clippers, not nail scissors  · Wear shoes and socks that fit well  Make sure they are not too tight  You may need to wear a shoe with the toe cut out, such as sandals, until your ingrown toenail heals  Do not wear shoes that have pointed toes or heels that are more than 2 inches high  Do not wear tight hose or socks  Wear socks that pull moisture away from your feet, such as cotton-acrylic blends  · Inspect your nails daily  Look for signs of an ingrown nail  Manage problems early so the nail does not become infected  When should I seek immediate care? · You have a red streak running up your leg or arm  When should I contact my healthcare provider? · Your pain is getting worse  · Your nail and skin are more swollen or start to drain pus  · You have a fever or chills  · Your ingrown nail is not better in 7 days      · You have questions or concerns about your condition or care   CARE AGREEMENT:   You have the right to help plan your care  Learn about your health condition and how it may be treated  Discuss treatment options with your caregivers to decide what care you want to receive  You always have the right to refuse treatment  The above information is an  only  It is not intended as medical advice for individual conditions or treatments  Talk to your doctor, nurse or pharmacist before following any medical regimen to see if it is safe and effective for you  © 2017 [facility] Information is for End User's use only and may not be sold, redistributed or otherwise used for commercial purposes  All illustrations and images included in CareNotes® are the copyrighted property of [employer]  or [place]

## 2019-07-22 ENCOUNTER — RADIATION ONCOLOGY FOLLOW-UP (OUTPATIENT)
Dept: RADIATION ONCOLOGY | Facility: HOSPITAL | Age: 69
End: 2019-07-22
Attending: STUDENT IN AN ORGANIZED HEALTH CARE EDUCATION/TRAINING PROGRAM
Payer: MEDICARE

## 2019-07-22 VITALS
OXYGEN SATURATION: 96 % | DIASTOLIC BLOOD PRESSURE: 70 MMHG | TEMPERATURE: 97.3 F | WEIGHT: 201 LBS | SYSTOLIC BLOOD PRESSURE: 120 MMHG | HEART RATE: 77 BPM | BODY MASS INDEX: 26.52 KG/M2 | RESPIRATION RATE: 18 BRPM

## 2019-07-22 DIAGNOSIS — C61 PROSTATE CANCER (HCC): Primary | ICD-10-CM

## 2019-07-22 PROCEDURE — 99211 OFF/OP EST MAY X REQ PHY/QHP: CPT | Performed by: STUDENT IN AN ORGANIZED HEALTH CARE EDUCATION/TRAINING PROGRAM

## 2019-07-22 NOTE — PROGRESS NOTES
Follow-up - Radiation Oncology   Juarez Gurrola 1950 71 y o  male [id number]      History of Present Illness   Cancer Staging  No matching staging information was found for the patient  Juarez Gurrola is a 71y o  year old male with pretreatment PSA of 12 2, pT2Nx, GS 4+3=7, adenocarcinoma of the prostate, status post radical prostatectomy 5/23/18      Post op PSA's:  7/2/18: 0 05  10/1/18: 0 11  1/7/19: 0 26  1/21/19: 0 27     He initiated ADT on 2/11/19, and received  Lupron       His radiation therapy was completed 6/7/19  [facility] , he was not experiencing any bowel or bladder issues  Today he returns for his first post radiation follow up       7/15/19 PSA was <0 01   His f/u with Dr Nakul Pepper 7/23 to be r/s                  Historical Information      Prostate cancer ([facility] )    2/2018 Initial Diagnosis     Prostate cancer ([facility] [address] )      2/1/2018 Biopsy     Prostate Biopsy (Dr Nakul Pepper)    A  Prostate, JUAN DANIEL, core needle biopsies:             - Benign prostate glands  - No malignancy is identified, supported on a prostate triple stain, performed on slide A2 with an appropriate control      B  Prostate, LCZ, core needle biopsy:             - Rare atypical prostate glands (less than 5% of the core biopsy), suspicious for prostatic adenocarcinoma, Min score 3 + 3 = 6, Prognostic Grade I              - Focal loss of staining for basal cell markers (p63 and ) is identified, and positive luminal staining for p504s is noted, (prostate triple stain, performed with an appropriate control)      C   Prostate, RCZ, core needle biopsy:             - Prostatic adenocarcinoma, [place] score 3 + 4 = 7, Prognostic Grade Grade II, involving approximately 30% of 1 of 2 core biopsies              - Less than 10% Min grade 4 prostatic adenocarcinoma             - High-grade PIN              - Loss of staining for basal cell markers (p63 and ) is identified, and positive luminal staining for p504s is noted, (prostate triple stain, performed with an appropriate control)      D  Prostate, RPZ, core needle biopsies:             - Prostatic adenocarcinoma, Min score 3 + 4 = 7, Prognostic Grade Group II, involving 2 of 4 core biopsies (80%, 75%)  - Approximately 30% [place] grade 4 prostatic adenocarcinoma is identified on each core  - Prostatic adenocarcinoma, [place] score 3 + 3 = 6, Prognostic Grade Group I, involving less than 5% of 1 of 4 core biopsies              - Loss of staining for basal cell markers (p63 and ) is identified, and positive luminal staining for p504s is noted, (prostate triple stain, performed with an appropriate control)            5/23/2018 Surgery     Prostate, radical prostatectomy: (Dr Yasmine Soto)  -  Prostatic adenocarcinoma, acinar type, [place] grade 4+3=7 (see synoptic report)      1  Specimen identification:       - Procedure:  Radical prostatectomy     - Prostate size and weight:  4 9 x 4 2 x 4 1 cm, 56g     - Lymph node sampling:  Not performed   2  Tumor     - Histologic type:  Acinar adenocarcinoma     - Histologic Grade: [place] Pattern:       * primary pattern:  4     * secondary pattern:  3     * tertiary pattern:  N/A     * total [place] Score: 7      * Grade Group:  3     * Percentage of pattern 4:60-70%     * Percentage of pattern 5: N/A     * Intraductal carcinoma: Not identified    - Tumor quantitation:  5-10%    - Extraprostatic extension (pT2): Not identified    - Urinary bladder neck invasion:  Not identified    - Seminal vesicle invasion:  Not identified   5  Margins: All surgical resection margins negative for carcinoma   6  Margin positivity in area of extraprostatic extension:  N/A   7  Treatment effect on carcinoma:  No known prior therapy   8  Lymph-vascular invasion:  Not identified (confirmed by immunohistochemical stains performed with appropriate controls on block A31 for CD31 and D2-40)   9  Perineural invasion:  Present   10  Regional lymph nodes (pNX):  No lymph nodes submitted  11  Additional pathologic findings:  atrophy  12  Ancillary studies:  N/A  13  PSA: No recent studies available  14   Best representative block if additional studies are needed:  A30  15  8th Ed AJCC Tumor Stage:  at least Stage  I -pT2, pNx, [place]'s score  4+3=7      2/11/2019 -  Hormone Therapy     Lupron Depot IM injection       4/16/2019 - 6/7/2019 Radiation     Treatment:  Course: C1    Plan ID Energy Fractions Dose per Fraction (cGy) Dose Correction (cGy) Total Dose Delivered (cGy) Elapsed Days   CD Pelvis 6X 12 / 12 180 0 2,160 16   Whole Pelvis 6X 25 / 25 180 0 4,500 35              Past Medical History:   Diagnosis Date    Basal cell carcinoma     Hyperlipidemia     Hypertension     Hypertension     last assesed 12-22-11    PONV (postoperative nausea and vomiting)     [facility] [address] [facility]     Seizures (HCC)     simple complex partial seizure--last seizure in 2004    Urinary incontinence      Past Surgical History:   Procedure Laterality Date    INGUINAL HERNIA REPAIR      [place] LAP,PROSTATECTOMY,RADICAL,W/NERVE SPARE,INCL ROBOTIC N/A 5/23/2018    Procedure: Arianne Jimenez;  Surgeon: Randy Lund MD;  [facility];  Service: Urology    PROSTATE BIOPSY  02/01/2018    Dr Jackie Dobbs  05/2018       Social History   Social History     Substance and Sexual Activity   Alcohol Use No     Social History     Substance and Sexual Activity   Drug Use No     Social History     Tobacco Use   Smoking Status Never Smoker   Smokeless Tobacco Never Used         Meds/Allergies     Current Outpatient Medications:     aspirin (ASPIRIN LOW DOSE) 81 MG tablet, Take 1 tablet by mouth daily, Disp: , Rfl:     calcium citrate-vitamin D (CITRACAL+D) 315-200 MG-UNIT per tablet, Take by mouth 2 (two) times a day  , Disp: , Rfl:     fexofenadine (ALLEGRA) 180 MG tablet, Take 1 tablet by mouth daily, Disp: , Rfl:     levETIRAcetam (KEPPRA) 1000 MG tablet, Take 1 tablet by mouth 2 (two) times a day, Disp: , Rfl:     lisinopril (ZESTRIL) 5 mg tablet, Take 1 tablet by mouth daily, Disp: , Rfl:     Melatonin 5 MG TABS, Take 5 mg by mouth daily at bedtime  , Disp: , Rfl:     Pediatric Multivitamins-Fl (MULTIVITAMINS/FL PO), Take 1 capsule by mouth daily, Disp: , Rfl:     simvastatin (ZOCOR) 40 mg tablet, Take 1 tablet by mouth, Disp: , Rfl:   Allergies   Allergen Reactions    Molds & Smuts      Other reaction(s): Respiratory Distress  Congestion, sore throat, coughing  seasonal    Other      Other reaction(s): Respiratory Distress  Congestion, sore throat, coughing  Review of Systems Constitutional: Positive for fatigue (slowly improving)  HENT: Negative  Eyes: Negative  Respiratory: Negative  Cardiovascular: Negative  Gastrointestinal: Negative  Endocrine: Positive for heat intolerance (hot flashes since lupron injection in february)  Genitourinary: Positive for frequency (nocturia x1)  Musculoskeletal: Positive for arthralgias (joint stiffness and aches mostly in the morning)  Skin: Negative  Allergic/Immunologic: Negative  Neurological: Negative  Hematological: Negative  Psychiatric/Behavioral: Negative  OBJECTIVE:   /70 (BP Location: Right arm)   Pulse 77   Temp (!) 97 3 °F (36 3 °C) (Temporal)   Resp 18   Wt 91 2 kg (201 lb)   SpO2 96%   BMI 26 52 kg/m²   Pain Assessment:  0  ECOG/Zubrod/WHO: 0 - Asymptomatic    Physical Exam GENERAL:  Appears stated age, in no apparent distress  Alert and oriented  HEENT:  Normocephalic, atraumatic   extraocular muscles intact  Oral mucosa moist   PULMONARY:  Respirations unlabored  CARDIOVASCULAR:  Regular rate  ABDOMEN:  Soft, nondistended  NEUROLOGIC: Moving all extremities, No focal deficits noted  EXTREMITIES: no clubbing, cyanosis, or edema  PSYCHIATRIC: normal mood and affect    Appropriate thought content and judgement  RESULTS    Lab Results: No results found for this or any previous visit (from the past 672 hour(s))  Imaging Studies:No results found  Assessment/Plan:  Orders Placed This Encounter   Procedures    PSA Total, Diagnostic        Alicia Jacinto is a 71y o  year old male with pretreatment PSA of 12 2, pT2Nx, GS 4+3=7, adenocarcinoma of the prostate, status post radical prostatectomy 5/23/18, with rising PSA post prostatectomy, now status post post prostatectomy RT with 6 months lupron    Patient denies any urinary symptoms, rectal bleeding/urgency  He is not due for colonoscopy until 2021  We reviewed post radiation PSA, and chronic side effects of irradiation  He will plan to follow up in 6 months with PSA, and will follow up with Dr Diana Pina in the interim  Eliseo Lara MD  7/22/2019,10:16 AM    Portions of the record may have been created with voice recognition software   Occasional wrong word or "sound a like" substitutions may have occurred due to the inherent limitations of voice recognition software   Read the chart carefully and recognize, using context, where substitutions have occurred

## 2019-07-22 NOTE — PROGRESS NOTES
Carmina Crawley 1950 is a 71 y o  male       Follow up visit     Oncology History    76year old male with pretreatment PSA of 12 2, pT2Nx, GS 4+3=7, adenocarcinoma of the prostate, status post radical prostatectomy 5/23/18  Post op PSA's:  7/2/18: 0 05  10/1/18: 0 11  1/7/19: 0 26  1/21/19: 0 27    He initiated ADT on 2/11/19, and received  Lupron  His radiation therapy was completed 6/7/19  At the [facility] , he was not experiencing any bowel or bladder issues  Today he returns for his first post radiation follow up      7/15/19 PSA was <0 01   His f/u with Dr Nazanin Rubin 7/23 to be r/s                  [facility] )    2/2018 Initial Diagnosis     Prostate cancer ([facility] )      2/1/2018 Biopsy     Prostate Biopsy (Dr Nazanin Rubin)    A  Prostate, JUAN DANIEL, core needle biopsies:             - Benign prostate glands  - No malignancy is identified, supported on a prostate triple stain, performed on slide A2 with an appropriate control      B  Prostate, LCZ, core needle biopsy:             - Rare atypical prostate glands (less than 5% of the core biopsy), suspicious for prostatic adenocarcinoma, [place] score 3 + 3 = 6, Prognostic Grade I              - Focal loss of staining for basal cell markers (p63 and ) is identified, and positive luminal staining for p504s is noted, (prostate triple stain, performed with an appropriate control)      C  Prostate, RCZ, core needle biopsy:             - Prostatic adenocarcinoma, Min score 3 + 4 = 7, Prognostic Grade Grade II, involving approximately 30% of 1 of 2 core biopsies              - Less than 10% Min grade 4 prostatic adenocarcinoma             - High-grade PIN              - Loss of staining for basal cell markers (p63 and ) is identified, and positive luminal staining for p504s is noted, (prostate triple stain, performed with an appropriate control)      D   Prostate, RPZ, core needle biopsies:             - Prostatic adenocarcinoma, Min score 3 + 4 = 7, Prognostic Grade Group II, involving 2 of 4 core biopsies (80%, 75%)  - Approximately 30% Min grade 4 prostatic adenocarcinoma is identified on each core  - Prostatic adenocarcinoma, Min score 3 + 3 = 6, Prognostic Grade Group I, involving less than 5% of 1 of 4 core biopsies              - Loss of staining for basal cell markers (p63 and ) is identified, and positive luminal staining for p504s is noted, (prostate triple stain, performed with an appropriate control)            5/23/2018 Surgery     Prostate, radical prostatectomy: (Dr Venus Jaimes)  -  Prostatic adenocarcinoma, acinar type, Min grade 4+3=7 (see synoptic report)      1  Specimen identification:       - Procedure:  Radical prostatectomy     - Prostate size and weight:  4 9 x 4 2 x 4 1 cm, 56g     - Lymph node sampling:  Not performed   2  Tumor     - Histologic type:  Acinar adenocarcinoma     - Histologic Grade: Min Pattern:       * primary pattern:  4     * secondary pattern:  3     * tertiary pattern:  N/A     * total Min Score: 7      * Grade Group:  3     * Percentage of pattern 4:60-70%     * Percentage of pattern 5: N/A     * Intraductal carcinoma: Not identified    - Tumor quantitation:  5-10%    - Extraprostatic extension (pT2): Not identified    - Urinary bladder neck invasion:  Not identified    - Seminal vesicle invasion:  Not identified   5  Margins: All surgical resection margins negative for carcinoma   6  Margin positivity in area of extraprostatic extension:  N/A   7  Treatment effect on carcinoma:  No known prior therapy   8  Lymph-vascular invasion:  Not identified (confirmed by immunohistochemical stains performed with appropriate controls on block A31 for CD31 and D2-40)   9  Perineural invasion:  Present   10  Regional lymph nodes (pNX):  No lymph nodes submitted  11  Additional pathologic findings:  atrophy  12  Ancillary studies:  N/A  13   PSA: No recent studies available  14   Best representative block if additional studies are needed:  A30  15  8th Ed AJCC Tumor Stage:  at least Stage  I -pT2, pNx, [place]'s score  4+3=7      2/11/2019 -  Hormone Therapy     Lupron Depot IM injection       4/16/2019 - 6/7/2019 Radiation     Treatment:  Course: C1    Plan ID Energy Fractions Dose per Fraction (cGy) Dose Correction (cGy) Total Dose Delivered (cGy) Elapsed Days   CD Pelvis 6X 12 / 12 180 0 2,160 16   Whole Pelvis 6X 25 / 25 180 0 4,500 35              Clinical Trial: no      Health Maintenance   Topic Date Due    Hepatitis C Screening  1950    Depression Screening PHQ  1950    Medicare Annual Wellness Visit (AWV)  1950    CRC Screening: Colonoscopy  1950    BMI: Followup Plan  05/07/1968    Fall Risk  05/07/2015    INFLUENZA VACCINE  07/01/2019    BMI: Adult  07/16/2020    DTaP,Tdap,and Td Vaccines (2 - Td) 11/29/2022    Pneumococcal Vaccine: 65+ Years  Completed    Pneumococcal Vaccine: Pediatrics (0 to 5 Years) and At-Risk Patients (6 to 59 Years)  Aged Out    HEPATITIS B VACCINES  Aged Out       Patient Active Problem List   Diagnosis    Prostate cancer ([facility] [address] [facility]    Tinea unguium    Ingrowing nail     Past Medical History:   Diagnosis Date    Basal cell carcinoma     Hyperlipidemia     Hypertension     Hypertension     last assesed 12-22-11    PONV (postoperative nausea and vomiting)     Prostate cancer ([facility]     Seizures ([facility] )     simple complex partial seizure--last seizure in 2004    Urinary incontinence      Past Surgical History:   Procedure Laterality Date    INGUINAL HERNIA REPAIR      [place] LAP,PROSTATECTOMY,RADICAL,W/NERVE SPARE,INCL ROBOTIC N/A 5/23/2018    Procedure: Christian Cantu;  Surgeon: Loni Herron MD;  [facility];  Service: Urology    PROSTATE BIOPSY  02/01/2018    Dr Katherine Piedra  05/2018     Family History   Problem Relation Age of Onset    No Known Problems Mother     Cancer Father     Cancer Brother      Social History     Socioeconomic History    Marital status: /Civil Union     Spouse name: Not on file    Number of children: Not on file    Years of education: Not on file    Highest education level: Not on file   Occupational History    Not on file   Social Needs    Financial resource strain: Not on file    Food insecurity:     Worry: Not on file     Inability: Not on file    Transportation needs:     Medical: Not on file     Non-medical: Not on file   Tobacco Use    Smoking status: Never Smoker    Smokeless tobacco: Never Used   Substance and Sexual Activity    Alcohol use: No    Drug use: No    Sexual activity: Not on file   Lifestyle    Physical activity:     Days per week: Not on file     Minutes per session: Not on file    Stress: Not on file   Relationships    Social connections:     Talks on phone: Not on file     Gets together: Not on file     Attends [employer] service: Not on file     Active member of club or organization: Not on file     Attends meetings of clubs or organizations: Not on file     Relationship status: Not on file    Intimate partner violence:     Fear of current or ex partner: Not on file     Emotionally abused: Not on file     Physically abused: Not on file     Forced sexual activity: Not on file   Other Topics Concern    Not on file   Social History Narrative    Not on file       Current Outpatient Medications:     aspirin (ASPIRIN LOW DOSE) 81 MG tablet, Take 1 tablet by mouth daily, Disp: , Rfl:     calcium citrate-vitamin D (CITRACAL+D) 315-200 MG-UNIT per tablet, Take by mouth 2 (two) times a day  , Disp: , Rfl:     fexofenadine (ALLEGRA) 180 MG tablet, Take 1 tablet by mouth daily, Disp: , Rfl:     levETIRAcetam (KEPPRA) 1000 MG tablet, Take 1 tablet by mouth 2 (two) times a day, Disp: , Rfl:     lisinopril (ZESTRIL) 5 mg tablet, Take 1 tablet by mouth daily, Disp: , Rfl:     Melatonin 5 MG TABS, Take 5 mg by mouth daily at bedtime  , Disp: , Rfl:     Pediatric Multivitamins-Fl (MULTIVITAMINS/FL PO), Take 1 capsule by mouth daily, Disp: , Rfl:     simvastatin (ZOCOR) 40 mg tablet, Take 1 tablet by mouth, Disp: , Rfl:   Allergies   Allergen Reactions    Molds & Smuts      Other reaction(s): Respiratory Distress  Congestion, sore throat, coughing  seasonal    Other      Other reaction(s): Respiratory Distress  Congestion, sore throat, coughing  Review of Systems:  Review of Systems   Constitutional: Positive for fatigue (slowly improving)  HENT: Negative  Eyes: Negative  Respiratory: Negative  Cardiovascular: Negative  Gastrointestinal: Negative  Endocrine: Positive for heat intolerance (hot flashes since lupron injection in february)  Genitourinary: Positive for frequency (nocturia x1)  Musculoskeletal: Positive for arthralgias (joint stiffness and aches mostly in the morning)  Skin: Negative  Allergic/Immunologic: Negative  Neurological: Negative  Hematological: Negative  Psychiatric/Behavioral: Negative  Vitals:    07/22/19 0900   BP: 120/70   BP Location: Right arm   Pulse: 77   Resp: 18   Temp: (!) 97 3 °F (36 3 °C)   TempSrc: Temporal   SpO2: 96%   Weight: 91 2 kg (201 lb)            Imaging:No results found

## 2019-07-29 ENCOUNTER — TELEPHONE (OUTPATIENT)
Dept: UROLOGY | Facility: MEDICAL CENTER | Age: 69
End: 2019-07-29

## 2019-07-29 NOTE — TELEPHONE ENCOUNTER
Patient of Dr Felipe Solano seen in both [place] and [facility]  Called to reschedule his cancelled follow up appointment from 7/23  First available I could find at [facility] was 10/28  Please check to ensure this appointment is appropriate  She can be reached at [phone] is needed to be changed

## 2019-07-29 NOTE — TELEPHONE ENCOUNTER
Patient was rescheduled to see Marino Richardson on 8/13/19    I had spoken with patient's wife to arrange

## 2019-08-13 ENCOUNTER — TELEPHONE (OUTPATIENT)
Dept: UROLOGY | Facility: AMBULATORY SURGERY CENTER | Age: 69
End: 2019-08-13

## 2019-08-13 NOTE — TELEPHONE ENCOUNTER
Called and left patient a message, holding an appointment tomorrow 8/14/19 3:00pm in [place] with Dr Grey Decker

## 2019-08-13 NOTE — TELEPHONE ENCOUNTER
Pt  Came into office for an appt  With Jonn Cunningham  Only wanted to see Grey Decker initially but then took an appt  With Rita   He was being see in office with in a timely maner but left because he was said he cant wait all day and now only wants to see Grey Decker

## 2019-08-14 ENCOUNTER — OFFICE VISIT (OUTPATIENT)
Dept: UROLOGY | Facility: AMBULATORY SURGERY CENTER | Age: 69
End: 2019-08-14
Payer: MEDICARE

## 2019-08-14 VITALS
BODY MASS INDEX: 26.11 KG/M2 | WEIGHT: 197 LBS | HEIGHT: 73 IN | SYSTOLIC BLOOD PRESSURE: 122 MMHG | DIASTOLIC BLOOD PRESSURE: 82 MMHG | HEART RATE: 68 BPM

## 2019-08-14 DIAGNOSIS — C61 PROSTATE CANCER (HCC): Primary | ICD-10-CM

## 2019-08-14 PROCEDURE — 99214 OFFICE O/P EST MOD 30 MIN: CPT | Performed by: UROLOGY

## 2019-08-14 NOTE — PROGRESS NOTES
8/14/2019    Juarez Gurrola  1950  [id number]    Discussion and Plan    Patient continues to do well status post short-term hormone deprivation external beam radiation for PSA recurrence after prior laparoscopic prostatectomy  PSA is currently undetectable  He will return in 3 months with repeat blood work prior to visit  All questions answered at this time  1  Prostate cancer ([facility] [address] )  - PSA Total, Diagnostic; Future  - PSA Total, Diagnostic; Future      Assessment      Patient Active Problem List   Diagnosis    Prostate cancer ([facility] [address] )    Tinea unguium    Ingrowing nail       History of Present Illness    Lupe Marte is a 71 y o  male seen today in regards to a history of Gl 7 T2c prostate cancer s/p DaVinci prostatectomy 5/23/18 and ED presents today for follow up  [facility] reports occasional and rare episodes of urinary stress incontinence   He does not require use of sanitary pad or diaper    PSA unfortunately has become detectable at 0 26   I again reviewed his prior pathology which had demonstrated Min 7 staged T2 prostate cancer with all margins negative  Repeat PSA confirms this finding  He has since been seen by radiation oncology and completed treatment 6/19  PET scan is otherwise negative for metastatic dissemination  He denies any significant urinary complaints  PSA remains undetectable post treatment  His residual hot flashes from hormone deprivation therapy      Urinary Symptom Assessment        Past Medical History  Past Medical History:   Diagnosis Date    Basal cell carcinoma     Hyperlipidemia     Hypertension     Hypertension     last assesed 12-22-11    PONV (postoperative nausea and vomiting)     Prostate cancer ([facility] [address] [facility]     Seizures ([facility] [address] )     simple complex partial seizure--last seizure in 2004    Urinary incontinence        Past Social History  Past Surgical History:   Procedure Laterality Date    INGUINAL HERNIA REPAIR      [facility] LAP,PROSTATECTOMY,RADICAL,W/NERVE SPARE,INCL ROBOTIC N/A 5/23/2018    Procedure: PROSTATECTOMY RADICAL W ROBOTICS;  Surgeon: Ronni Fontaine MD;  [facility];  Service: Urology    PROSTATE BIOPSY  02/01/2018    Dr Johnathon Arnold  05/2018       Past Family History  Family History   Problem Relation Age of Onset    No Known Problems Mother     Cancer Father     Cancer Brother        Past Social history  Social History     Socioeconomic History    Marital status: /Civil Union     Spouse name: Not on file    Number of children: Not on file    Years of education: Not on file    Highest education level: Not on file   Occupational History    Not on file   Social Needs    Financial resource strain: Not on file    Food insecurity:     Worry: Not on file     Inability: Not on file    Transportation needs:     Medical: Not on file     Non-medical: Not on file   Tobacco Use    Smoking status: Never Smoker    Smokeless tobacco: Never Used   Substance and Sexual Activity    Alcohol use: No    Drug use: No    Sexual activity: Not on file   Lifestyle    Physical activity:     Days per week: Not on file     Minutes per session: Not on file    Stress: Not on file   Relationships    Social connections:     Talks on phone: Not on file     Gets together: Not on file     Attends [employer] service: Not on file     Active member of club or organization: Not on file     Attends meetings of clubs or organizations: Not on file     Relationship status: Not on file    Intimate partner violence:     Fear of current or ex partner: Not on file     Emotionally abused: Not on file     Physically abused: Not on file     Forced sexual activity: Not on file   Other Topics Concern    Not on file   Social History Narrative    Not on file       Current Medications  Current Outpatient Medications   Medication Sig Dispense Refill    aspirin (ASPIRIN LOW DOSE) 81 MG tablet Take 1 tablet by mouth daily      calcium citrate-vitamin D (CITRACAL+D) 315-200 MG-UNIT per tablet Take by mouth 2 (two) times a day        fexofenadine (ALLEGRA) 180 MG tablet Take 1 tablet by mouth daily      levETIRAcetam (KEPPRA) 1000 MG tablet Take 1 tablet by mouth 2 (two) times a day      lisinopril (ZESTRIL) 5 mg tablet Take 1 tablet by mouth daily      Melatonin 5 MG TABS Take 5 mg by mouth daily at bedtime        Pediatric Multivitamins-Fl (MULTIVITAMINS/FL PO) Take 1 capsule by mouth daily      simvastatin (ZOCOR) 40 mg tablet Take 1 tablet by mouth       No current facility-administered medications for this visit  Allergies  Allergies   Allergen Reactions    Molds & Smuts      Other reaction(s): Respiratory Distress  Congestion, sore throat, coughing  seasonal    Other      Other reaction(s): Respiratory Distress  Congestion, sore throat, coughing  Past Medical History, Social History, Family History, medications and allergies were reviewed  Review of Systems  Review of Systems   Constitutional: Negative  HENT: Negative  Eyes: Negative  Respiratory: Negative  Cardiovascular: Negative  Gastrointestinal: Negative  Endocrine: Negative  Genitourinary: Negative for decreased urine volume, difficulty urinating, hematuria and urgency  Musculoskeletal: Negative  Skin: Negative  Neurological: Negative  Hematological: Negative  Psychiatric/Behavioral: Negative  Vitals  Vitals:    08/14/19 1450   BP: 122/82   BP Location: Left arm   Patient Position: Sitting   Cuff Size: Adult   Pulse: 68   Weight: 89 4 kg (197 lb)   Height: 6' 1" (1 854 m)         Physical Exam    Physical Exam   Constitutional: He is oriented to person, place, and time  He appears well-developed and well-nourished  HENT:   Head: Normocephalic and atraumatic  Eyes: Pupils are equal, round, and reactive to light  Neck: Normal range of motion  Cardiovascular: Normal rate, regular rhythm and normal heart sounds     Pulmonary/Chest: Effort normal and breath sounds normal  No accessory muscle usage  No respiratory distress  Abdominal: Soft  Normal appearance and bowel sounds are normal  There is no tenderness  Musculoskeletal: Normal range of motion  Neurological: He is alert and oriented to person, place, and time  Skin: Skin is warm, dry and intact  Psychiatric: He has a normal mood and affect  His speech is normal  Cognition and memory are normal    Nursing note and vitals reviewed  Results    Below listed labs, pathology results, and radiology images were personally reviewed:    No results found for: PSA  Lab Results   Component Value Date    GLUCOSE 96 02/19/2015    CALCIUM 8 5 05/24/2018     02/19/2015    K 4 7 05/24/2018    CO2 27 05/24/2018     (H) 05/24/2018    BUN 10 05/24/2018    CREATININE 0 97 05/24/2018     Lab Results   Component Value Date    WBC 7 18 05/24/2018    HGB 12 7 05/24/2018    HCT 37 2 05/24/2018    MCV 93 05/24/2018     (L) 05/24/2018       No results found for this or any previous visit (from the past 1 hour(s))  ]    Ref Range & Units 7/15/19  7:05 AM    PSA, Total <4 00 ng/mL <0 01

## 2019-08-27 ENCOUNTER — OFFICE VISIT (OUTPATIENT)
Dept: PODIATRY | Facility: CLINIC | Age: 69
End: 2019-08-27
Payer: MEDICARE

## 2019-08-27 VITALS
BODY MASS INDEX: 26.83 KG/M2 | HEIGHT: 73 IN | SYSTOLIC BLOOD PRESSURE: 120 MMHG | DIASTOLIC BLOOD PRESSURE: 78 MMHG | WEIGHT: 202.4 LBS | HEART RATE: 77 BPM

## 2019-08-27 DIAGNOSIS — L60.0 INGROWING NAIL: ICD-10-CM

## 2019-08-27 DIAGNOSIS — B35.1 TINEA UNGUIUM: Primary | ICD-10-CM

## 2019-08-27 PROCEDURE — 99213 OFFICE O/P EST LOW 20 MIN: CPT | Performed by: PODIATRIST

## 2019-08-27 NOTE — PROGRESS NOTES
Assessment:  1  Onychomycosis  2  Ingrown nail (resolved)    Plan:  1  I reviewed the fungal culture results noting that a small number of colonies of a mold were isolated  2  I reviewed treatment options with him including benign neglect, topical therapy with a low probability of cure and oral therapy with a higher probability of cure but with restrictions on concomitant meds and alcohol consumption and necessary lab work monitoring  3  He declined treatment of the oncyhomycosis at this time      Problem List Items Addressed This Visit        Musculoskeletal and Integument    Tinea unguium - Primary    Ingrowing nail             Diagnoses and all orders for this visit:    Tinea unguium    Ingrowing nail          Subjective:      Patient ID: Presley Case is a 71 y o  male  Steve Washington is back after having an ingrown nail removal (he relates all pain, drainage has completely resolved) and also a nail culture obtained  The following portions of the patient's history were reviewed and updated as appropriate: allergies, current medications, past family history, past medical history, past social history, past surgical history and problem list     Review of Systems   Constitutional: Negative  Respiratory: Negative  Cardiovascular: Negative  Gastrointestinal: Negative  Genitourinary: Negative for difficulty urinating  Musculoskeletal: Negative  Neurological: Negative  Hematological: Negative  Psychiatric/Behavioral: Negative  Objective:      /78   Pulse 77   Ht 6' 1" (1 854 m)   Wt 91 8 kg (202 lb 6 4 oz)   BMI 26 70 kg/m²          Physical Exam   Constitutional: He is oriented to person, place, and time  He appears well-developed and well-nourished  No distress  HENT:   Head: Normocephalic  Eyes: Pupils are equal, round, and reactive to light  Neck: No JVD present     Cardiovascular:   Pulses:       Dorsalis pedis pulses are 3+ on the right side, and 3+ on the left side         Posterior tibial pulses are 3+ on the right side, and 3+ on the left side  Pulmonary/Chest: Effort normal and breath sounds normal    Abdominal: Soft  Bowel sounds are normal    Musculoskeletal: Normal range of motion  Right foot: There is normal range of motion and no deformity  Left foot: There is normal range of motion and no deformity  Feet:   Right Foot:   Protective Sensation: 10 sites tested  10 sites sensed  Skin Integrity: Negative for ulcer, blister, skin breakdown, erythema, warmth, callus or dry skin  Left Foot:   Protective Sensation: 10 sites tested  10 sites sensed  Skin Integrity: Negative for ulcer, blister, skin breakdown, erythema, warmth, callus or dry skin  Neurological: He is alert and oriented to person, place, and time  Skin: Skin is warm  Capillary refill takes less than 2 seconds  He is not diaphoretic  Psychiatric: He has a normal mood and affect  Nursing note and vitals reviewed  The ingrown nail site is resolved  I note muscle function of the anterior, posterior, evertor and invertor muscle groups of the lower leg are intact and normal  I note ROM of the ankle joint, subtalar joint, Choparts and Lisfranc's joint complexes and metatarsophalangeal joints are WNL without crepitus nor restrictions bilaterally  The Left hallux nail is thick, discolored with subungual debris encompassing approximately the distal 2/3 of the nail plate

## 2019-12-13 NOTE — PROGRESS NOTES
12/16/2019    Neda Plata  1950  [id number]    Discussion and Plan      Patient continues to do quite well with no evidence of recurrent prostate cancer at this juncture  He will return in 3 months with repeat PSA prior to visit  All questions answered at this time  1  Prostate cancer ([facility] [address] )  - PSA Total, Diagnostic; Future    Assessment      Patient Active Problem List   Diagnosis    Prostate cancer ([facility] [address] )    Tinea unguium    Ingrowing nail       History of Present Illness    Merlinda Forester is a 71 y o  male seen today in regards to a history of Gl 7 T2c prostate cancer s/p DaVinci prostatectomy 5/23/18 and ED presents today for follow up  [facility] Malave reports occasional and rare episodes of urinary stress incontinence   He does not require use of sanitary pad or diaper    PSA unfortunately has become detectable at 0 26   I again reviewed his prior pathology which had demonstrated Min 7 staged T2 prostate cancer with all margins negative   Repeat PSA confirms this finding  Florinda Malave has since been seen by radiation oncology and completed treatment 6/19   PET scan is otherwise negative for metastatic dissemination  He denies any significant urinary complaints  PSA remains undetectable post treatment  His residual hot flashes from hormone deprivation therapy      Urinary Symptom Assessment        Past Medical History  Past Medical History:   Diagnosis Date    Basal cell carcinoma     Basal cell carcinoma     Hyperlipidemia     Hypertension     Hypertension     last assesed 12-22-11    PONV (postoperative nausea and vomiting)     Prostate cancer ([facility] [address] )     Seizures ([facility])     simple complex partial seizure--last seizure in 2004    Urinary incontinence        Past Social History  Past Surgical History:   Procedure Laterality Date    INGUINAL HERNIA REPAIR      AL LAP,PROSTATECTOMY,RADICAL,W/NERVE SPARE,INCL ROBOTIC N/A 5/23/2018    Procedure: Fran Yepez;  Surgeon: Venita Rviero MD;  [facility];  Service: Urology    PROSTATE BIOPSY  02/01/2018    Dr Rik Rolon  05/2018       Past Family History  Family History   Problem Relation Age of Onset    No Known Problems Mother     Cancer Father     Cancer Brother        Past Social history  Social History     Socioeconomic History    Marital status: /Civil Union     Spouse name: Not on file    Number of children: Not on file    Years of education: Not on file    Highest education level: Not on file   Occupational History    Not on file   Social Needs    Financial resource strain: Not on file    Food insecurity:     Worry: Not on file     Inability: Not on file    Transportation needs:     Medical: Not on file     Non-medical: Not on file   Tobacco Use    Smoking status: Never Smoker    Smokeless tobacco: Never Used   Substance and Sexual Activity    Alcohol use: No    Drug use: No    Sexual activity: Not on file   Lifestyle    Physical activity:     Days per week: Not on file     Minutes per session: Not on file    Stress: Not on file   Relationships    Social connections:     Talks on phone: Not on file     Gets together: Not on file     Attends Rastafarian service: Not on file     Active member of club or organization: Not on file     Attends meetings of clubs or organizations: Not on file     Relationship status: Not on file    Intimate partner violence:     Fear of current or ex partner: Not on file     Emotionally abused: Not on file     Physically abused: Not on file     Forced sexual activity: Not on file   Other Topics Concern    Not on file   Social History Narrative    Not on file       Current Medications  Current Outpatient Medications   Medication Sig Dispense Refill    aspirin (ASPIRIN LOW DOSE) 81 MG tablet Take 1 tablet by mouth daily      calcium citrate-vitamin D (CITRACAL+D) 315-200 MG-UNIT per tablet Take by mouth 2 (two) times a day        clotrimazole-betamethasone (LOTRISONE) 1-0 05 % cream Apply 1 application topically 2 (two) times a day      fexofenadine (ALLEGRA) 180 MG tablet Take 1 tablet by mouth daily      levETIRAcetam (KEPPRA) 1000 MG tablet Take 1 tablet by mouth 2 (two) times a day      lisinopril (ZESTRIL) 5 mg tablet Take 1 tablet by mouth daily      Melatonin 5 MG TABS Take 5 mg by mouth daily at bedtime        Pediatric Multivitamins-Fl (MULTIVITAMINS/FL PO) Take 1 capsule by mouth daily      simvastatin (ZOCOR) 40 mg tablet Take 1 tablet by mouth       No current facility-administered medications for this visit  Allergies  Allergies   Allergen Reactions    Molds & Smuts      Other reaction(s): Respiratory Distress  Congestion, sore throat, coughing  seasonal    Other      Other reaction(s): Respiratory Distress  Congestion, sore throat, coughing  Past Medical History, Social History, Family History, medications and allergies were reviewed  Review of Systems  Review of Systems   Constitutional: Negative  HENT: Negative  Eyes: Negative  Respiratory: Negative  Cardiovascular: Negative  Gastrointestinal: Negative  Endocrine: Negative  Genitourinary: Negative for decreased urine volume, difficulty urinating, hematuria and urgency  Musculoskeletal: Negative  Skin: Negative  Neurological: Negative  Hematological: Negative  Psychiatric/Behavioral: Negative  Vitals  Vitals:    12/16/19 0843   BP: 122/76   BP Location: Left arm   Patient Position: Sitting   Cuff Size: Adult   Pulse: 94   Weight: 88 5 kg (195 lb)   Height: 6' (1 829 m)         Physical Exam    Physical Exam   Constitutional: He is oriented to person, place, and time  He appears well-developed and well-nourished  HENT:   Head: Normocephalic and atraumatic  Eyes: Pupils are equal, round, and reactive to light  Neck: Normal range of motion  Cardiovascular: Normal rate, regular rhythm and normal heart sounds     Pulmonary/Chest: Effort normal and breath sounds normal  No accessory muscle usage  No respiratory distress  Abdominal: Soft  Normal appearance and bowel sounds are normal  There is no tenderness  Musculoskeletal: Normal range of motion  Neurological: He is alert and oriented to person, place, and time  Skin: Skin is warm, dry and intact  Psychiatric: He has a normal mood and affect  His speech is normal  Cognition and memory are normal    Nursing note and vitals reviewed        Results    Below listed labs, pathology results, and radiology images were personally reviewed:    No results found for: PSA  Lab Results   Component Value Date    GLUCOSE 96 02/19/2015    CALCIUM 8 5 05/24/2018     02/19/2015    K 4 7 05/24/2018    CO2 27 05/24/2018     (H) 05/24/2018    BUN 10 05/24/2018    CREATININE 0 97 05/24/2018     Lab Results   Component Value Date    WBC 7 18 05/24/2018    HGB 12 7 05/24/2018    HCT 37 2 05/24/2018    MCV 93 05/24/2018     (L) 05/24/2018       No results found for this or any previous visit (from the past 1 hour(s)) ]

## 2019-12-16 ENCOUNTER — OFFICE VISIT (OUTPATIENT)
Dept: UROLOGY | Facility: AMBULATORY SURGERY CENTER | Age: 69
End: 2019-12-16
Payer: MEDICARE

## 2019-12-16 VITALS
HEIGHT: 72 IN | SYSTOLIC BLOOD PRESSURE: 122 MMHG | BODY MASS INDEX: 26.41 KG/M2 | HEART RATE: 94 BPM | WEIGHT: 195 LBS | DIASTOLIC BLOOD PRESSURE: 76 MMHG

## 2019-12-16 DIAGNOSIS — C61 PROSTATE CANCER (HCC): Primary | ICD-10-CM

## 2019-12-16 PROCEDURE — 99213 OFFICE O/P EST LOW 20 MIN: CPT | Performed by: UROLOGY

## 2019-12-16 RX ORDER — CLOTRIMAZOLE AND BETAMETHASONE DIPROPIONATE 10; .64 MG/G; MG/G
1 CREAM TOPICAL 2 TIMES DAILY
COMMUNITY
Start: 2019-11-04

## 2020-01-23 ENCOUNTER — RADIATION ONCOLOGY FOLLOW-UP (OUTPATIENT)
Dept: RADIATION ONCOLOGY | Facility: HOSPITAL | Age: 70
End: 2020-01-23
Attending: STUDENT IN AN ORGANIZED HEALTH CARE EDUCATION/TRAINING PROGRAM
Payer: MEDICARE

## 2020-01-23 VITALS
OXYGEN SATURATION: 98 % | RESPIRATION RATE: 16 BRPM | BODY MASS INDEX: 27.69 KG/M2 | HEART RATE: 76 BPM | TEMPERATURE: 97.7 F | DIASTOLIC BLOOD PRESSURE: 76 MMHG | WEIGHT: 204.2 LBS | SYSTOLIC BLOOD PRESSURE: 130 MMHG

## 2020-01-23 DIAGNOSIS — C61 PROSTATE CANCER (HCC): Primary | ICD-10-CM

## 2020-01-23 PROCEDURE — 99211 OFF/OP EST MAY X REQ PHY/QHP: CPT | Performed by: STUDENT IN AN ORGANIZED HEALTH CARE EDUCATION/TRAINING PROGRAM

## 2020-01-23 NOTE — PROGRESS NOTES
Lauren Unger 1950 is a 71 y o  male       Follow up visit       Oncology History    Lauren Unger is a 71y o  year old male with pretreatment PSA of 12 2, pT2Nx, GS 4+3=7, adenocarcinoma of the prostate, status post radical prostatectomy 5/23/18      Post op PSA's:  7/2/18: 0 05  10/1/18: 0 11  1/7/19: 0 26  1/21/19: 0 27 ( initiated ADT 2/11/19)  7/15/19 : <0 01     He initiated ADT on 2/11/19, and received  Lupron       His radiation therapy was completed 6/7/19  At the [facility] , he was not experiencing any bowel or bladder issues  Today he returns for routine  post radiation follow up      8/14/19 He saw Dr Sweta Wheeler, denied any significant urinary complaints  12/2/19 PSA @ [facility]: <0 01    12/16/19 saw Dr Sweta Wheeler  Reports occasional and rare episodes of urinary stress incontinence  3/17/2020 next f/u with Urology        Prostate cancer [facility])    2/2018 Initial Diagnosis     Prostate cancer ([facility] [address] )      2/1/2018 Biopsy     Prostate Biopsy (Dr Sweta Wheeler)    A  Prostate, JUAN DANIEL, core needle biopsies:             - Benign prostate glands  - No malignancy is identified, supported on a prostate triple stain, performed on slide A2 with an appropriate control      B  Prostate, LCZ, core needle biopsy:             - Rare atypical prostate glands (less than 5% of the core biopsy), suspicious for prostatic adenocarcinoma, Min score 3 + 3 = 6, Prognostic Grade I              - Focal loss of staining for basal cell markers (p63 and ) is identified, and positive luminal staining for p504s is noted, (prostate triple stain, performed with an appropriate control)      C   Prostate, RCZ, core needle biopsy:             - Prostatic adenocarcinoma, Min score 3 + 4 = 7, Prognostic Grade Grade II, involving approximately 30% of 1 of 2 core biopsies              - Less than 10% Min grade 4 prostatic adenocarcinoma             - High-grade PIN              - Loss of staining for basal cell markers (p63 and CK 903) is identified, and positive luminal staining for p504s is noted, (prostate triple stain, performed with an appropriate control)      D  Prostate, RPZ, core needle biopsies:             - Prostatic adenocarcinoma, Min score 3 + 4 = 7, Prognostic Grade Group II, involving 2 of 4 core biopsies (80%, 75%)  - Approximately 30% Haswell grade 4 prostatic adenocarcinoma is identified on each core  - Prostatic adenocarcinoma, Min score 3 + 3 = 6, Prognostic Grade Group I, involving less than 5% of 1 of 4 core biopsies              - Loss of staining for basal cell markers (p63 and ) is identified, and positive luminal staining for p504s is noted, (prostate triple stain, performed with an appropriate control)            5/23/2018 Surgery     Prostate, radical prostatectomy: (Dr Cody Reyna)  -  Prostatic adenocarcinoma, acinar type, Haswell grade 4+3=7 (see synoptic report)      1  Specimen identification:       - Procedure:  Radical prostatectomy     - Prostate size and weight:  4 9 x 4 2 x 4 1 cm, 56g     - Lymph node sampling:  Not performed   2  Tumor     - Histologic type:  Acinar adenocarcinoma     - Histologic Grade: Haswell Pattern:       * primary pattern:  4     * secondary pattern:  3     * tertiary pattern:  N/A     * total Haswell Score: 7      * Grade Group:  3     * Percentage of pattern 4:60-70%     * Percentage of pattern 5: N/A     * Intraductal carcinoma: Not identified    - Tumor quantitation:  5-10%    - Extraprostatic extension (pT2): Not identified    - Urinary bladder neck invasion:  Not identified    - Seminal vesicle invasion:  Not identified   5  Margins: All surgical resection margins negative for carcinoma   6  Margin positivity in area of extraprostatic extension:  N/A   7  Treatment effect on carcinoma:  No known prior therapy   8   Lymph-vascular invasion:  Not identified (confirmed by immunohistochemical stains performed with appropriate controls on block A31 for CD31 and D2-40)   9  Perineural invasion:  Present   10  Regional lymph nodes (pNX):  No lymph nodes submitted  11  Additional pathologic findings:  atrophy  12  Ancillary studies:  N/A  13  PSA: No recent studies available  14   Best representative block if additional studies are needed:  A30  15  8th Ed AJCC Tumor Stage:  at least Stage  I -pT2, pNx, Bobtown's score  4+3=7      2/11/2019 -  Hormone Therapy     Lupron Depot IM injection       4/16/2019 - 6/7/2019 Radiation     Treatment:  Course: C1    Plan ID Energy Fractions Dose per Fraction (cGy) Dose Correction (cGy) Total Dose Delivered (cGy) Elapsed Days   CD Pelvis 6X 12 / 12 180 0 2,160 16   Whole Pelvis 6X 25 / 25 180 0 4,500 35              Clinical Trial: No    Health Maintenance   Topic Date Due    Hepatitis C Screening  1950    Depression Screening PHQ  1950    Medicare Annual Wellness Visit (AWV)  1950    CRC Screening: Colonoscopy  1950    BMI: Followup Plan  05/07/1968    Fall Risk  05/07/2015    BMI: Adult  12/16/2020    DTaP,Tdap,and Td Vaccines (2 - Td) 11/29/2022    Influenza Vaccine  Completed    Pneumococcal Vaccine: 65+ Years  Completed    Pneumococcal Vaccine: Pediatrics (0 to 5 Years) and At-Risk Patients (6 to 59 Years)  Aged Out    HIB Vaccine  Aged Out    Hepatitis B Vaccine  Aged Out    IPV Vaccine  Aged Out    Hepatitis A Vaccine  Aged Out    Meningococcal ACWY Vaccine  Aged Out    HPV Vaccine  Aged Out       Patient Active Problem List   Diagnosis    Prostate cancer ([facility] [address] [facility]    Tinea unguium    Ingrowing nail     Past Medical History:   Diagnosis Date    Basal cell carcinoma     Basal cell carcinoma     Hyperlipidemia     Hypertension     Hypertension     last assesed 12-22-11    PONV (postoperative nausea and vomiting)     Prostate cancer ([facility] [address] [facility]     Seizures ([facility] [address] )     simple complex partial seizure--last seizure in 2004    Urinary incontinence      Past Surgical History: Procedure Laterality Date    INGUINAL HERNIA REPAIR      [place] LAP,PROSTATECTOMY,RADICAL,W/NERVE SPARE,INCL ROBOTIC N/A 5/23/2018    Procedure: Chidi Anchors W ROBOTICS;  Surgeon: Tess Tellez MD;  [facility];  Service: Urology    PROSTATE BIOPSY  02/01/2018    Dr Mele Hurley  05/2018     Family History   Problem Relation Age of Onset    No Known Problems Mother     Cancer Father     Cancer Brother      Social History     Socioeconomic History    Marital status: /Civil Union     Spouse name: Not on file    Number of children: Not on file    Years of education: Not on file    Highest education level: Not on file   Occupational History    Not on file   Social Needs    Financial resource strain: Not on file    Food insecurity:     Worry: Not on file     Inability: Not on file    Transportation needs:     Medical: Not on file     Non-medical: Not on file   Tobacco Use    Smoking status: Never Smoker    Smokeless tobacco: Never Used   Substance and Sexual Activity    Alcohol use: No    Drug use: No    Sexual activity: Not on file   Lifestyle    Physical activity:     Days per week: Not on file     Minutes per session: Not on file    Stress: Not on file   Relationships    Social connections:     Talks on phone: Not on file     Gets together: Not on file     Attends L[employer] service: Not on file     Active member of club or organization: Not on file     Attends meetings of clubs or organizations: Not on file     Relationship status: Not on file    Intimate partner violence:     Fear of current or ex partner: Not on file     Emotionally abused: Not on file     Physically abused: Not on file     Forced sexual activity: Not on file   Other Topics Concern    Not on file   Social History Narrative    Not on file       Current Outpatient Medications:     aspirin (ASPIRIN LOW DOSE) 81 MG tablet, Take 1 tablet by mouth daily, Disp: , Rfl:    calcium citrate-vitamin D (CITRACAL+D) 315-200 MG-UNIT per tablet, Take by mouth 2 (two) times a day  , Disp: , Rfl:     clotrimazole-betamethasone (LOTRISONE) 1-0 05 % cream, Apply 1 application topically 2 (two) times a day, Disp: , Rfl:     fexofenadine (ALLEGRA) 180 MG tablet, Take 1 tablet by mouth daily, Disp: , Rfl:     levETIRAcetam (KEPPRA) 1000 MG tablet, Take 1 tablet by mouth 2 (two) times a day, Disp: , Rfl:     lisinopril (ZESTRIL) 5 mg tablet, Take 1 tablet by mouth daily, Disp: , Rfl:     Melatonin 5 MG TABS, Take 5 mg by mouth daily at bedtime  , Disp: , Rfl:     Pediatric Multivitamins-Fl (MULTIVITAMINS/FL PO), Take 1 capsule by mouth daily, Disp: , Rfl:     simvastatin (ZOCOR) 40 mg tablet, Take 1 tablet by mouth, Disp: , Rfl:   Allergies   Allergen Reactions    Molds & Smuts      Other reaction(s): Respiratory Distress  Congestion, sore throat, coughing  seasonal    Other      Other reaction(s): Respiratory Distress  Congestion, sore throat, coughing  Review of Systems:  Review of Systems   Constitutional: Positive for unexpected weight change (gained 5-10 lbs over the last year, since Lupron, despite being active)  Continues swimming 3-4 days per week, staying active, good appetite   HENT: Negative  Eyes: Negative  Respiratory: Negative  Cardiovascular: Negative  Gastrointestinal: Negative  Endocrine: Positive for heat intolerance (still getting [facility])  Genitourinary: Negative  Denies dysuria, incontinence, nocturia x0,    Musculoskeletal: Positive for arthralgias (left knee aches )  Skin: Negative  Allergic/Immunologic: Negative  Neurological: Negative  Hematological: Negative  Psychiatric/Behavioral: Positive for sleep disturbance (wakes up with hot flashes during the night)         Vitals:    01/23/20 0809   BP: 130/76   BP Location: Right arm   Pulse: 76   Resp: 16   Temp: 97 7 °F (36 5 °C) TempSrc: Temporal   SpO2: 98%   Weight: 92 6 kg (204 lb 3 2 oz)               Imaging:No results found

## 2020-01-23 NOTE — PROGRESS NOTES
Follow-up - Radiation Oncology   Lee Perez 1950 71 y o  male [id number]      History of Present Illness   Cancer Staging  No matching staging information was found for the patient  Lee Perez is a 71y o  year old male with  pretreatment PSA of 12 2, pT2Nx, GS 4+3=7, adenocarcinoma of the prostate, status post radical prostatectomy 5/23/18      Post op PSA's:  7/2/18: 0 05  10/1/18: 0 11  1/7/19: 0 26  1/21/19: 0 27 ( initiated ADT 2/11/19)  7/15/19 : <0 01     He initiated ADT on 2/11/19, and received  Lupron       His radiation therapy was completed 6/7/19  At the [facility] , he was not experiencing any bowel or bladder issues  Today he returns for routine  post radiation follow up       8/14/19 He saw Dr Momo Calle, denied any significant urinary complaints       12/2/19 PSA @ [facility]: <0 01     12/16/19 saw Dr Momo Calle  Reports occasional and rare episodes of urinary stress incontinence       3/17/2020 next f/u with Urology      Historical Information      Prostate cancer [facility])    2/2018 Initial Diagnosis     Prostate cancer ([facility] [address] )      2/1/2018 Biopsy     Prostate Biopsy (Dr Momo Calle)    A  Prostate, JUAN DANIEL, core needle biopsies:             - Benign prostate glands  - No malignancy is identified, supported on a prostate triple stain, performed on slide A2 with an appropriate control      B  Prostate, LCZ, core needle biopsy:             - Rare atypical prostate glands (less than 5% of the core biopsy), suspicious for prostatic adenocarcinoma, Min score 3 + 3 = 6, Prognostic Grade I              - Focal loss of staining for basal cell markers (p63 and ) is identified, and positive luminal staining for p504s is noted, (prostate triple stain, performed with an appropriate control)      C   Prostate, RCZ, core needle biopsy:             - Prostatic adenocarcinoma, Min score 3 + 4 = 7, Prognostic Grade Grade II, involving approximately 30% of 1 of 2 core biopsies              - Less than 10% Min grade 4 prostatic adenocarcinoma             - High-grade PIN              - Loss of staining for basal cell markers (p63 and ) is identified, and positive luminal staining for p504s is noted, (prostate triple stain, performed with an appropriate control)      D  Prostate, RPZ, core needle biopsies:             - Prostatic adenocarcinoma, Chesterfield score 3 + 4 = 7, Prognostic Grade Group II, involving 2 of 4 core biopsies (80%, 75%)  - Approximately 30% Min grade 4 prostatic adenocarcinoma is identified on each core  - Prostatic adenocarcinoma, Min score 3 + 3 = 6, Prognostic Grade Group I, involving less than 5% of 1 of 4 core biopsies              - Loss of staining for basal cell markers (p63 and ) is identified, and positive luminal staining for p504s is noted, (prostate triple stain, performed with an appropriate control)            5/23/2018 Surgery     Prostate, radical prostatectomy: (Dr Buck Melgar)  -  Prostatic adenocarcinoma, acinar type, Chesterfield grade 4+3=7 (see synoptic report)      1  Specimen identification:       - Procedure:  Radical prostatectomy     - Prostate size and weight:  4 9 x 4 2 x 4 1 cm, 56g     - Lymph node sampling:  Not performed   2  Tumor     - Histologic type:  Acinar adenocarcinoma     - Histologic Grade: Min Pattern:       * primary pattern:  4     * secondary pattern:  3     * tertiary pattern:  N/A     * total Chesterfield Score: 7      * Grade Group:  3     * Percentage of pattern 4:60-70%     * Percentage of pattern 5: N/A     * Intraductal carcinoma: Not identified    - Tumor quantitation:  5-10%    - Extraprostatic extension (pT2): Not identified    - Urinary bladder neck invasion:  Not identified    - Seminal vesicle invasion:  Not identified   5  Margins: All surgical resection margins negative for carcinoma   6  Margin positivity in area of extraprostatic extension:  N/A   7   Treatment effect on carcinoma:  No known prior therapy   8  Lymph-vascular invasion:  Not identified (confirmed by immunohistochemical stains performed with appropriate controls on block A31 for CD31 and D2-40)   9  Perineural invasion:  Present   10  Regional lymph nodes (pNX):  No lymph nodes submitted  11  Additional pathologic findings:  atrophy  12  Ancillary studies:  N/A  13  PSA: No recent studies available  14   Best representative block if additional studies are needed:  A30  15  8th Ed AJCC Tumor Stage:  at least Stage  I -pT2, pNx, Min's score  4+3=7      2/11/2019 -  Hormone Therapy     Lupron Depot IM injection       4/16/2019 - 6/7/2019 Radiation     Treatment:  Course: C1    Plan ID Energy Fractions Dose per Fraction (cGy) Dose Correction (cGy) Total Dose Delivered (cGy) Elapsed Days   CD Pelvis 6X 12 / 12 180 0 2,160 16   Whole Pelvis 6X 25 / 25 180 0 4,500 35              Past Medical History:   Diagnosis Date    Basal cell carcinoma     Basal cell carcinoma     Hyperlipidemia     Hypertension     Hypertension     last assesed 12-22-11    PONV (postoperative nausea and vomiting)     Prostate cancer ([facility] [address] )     Seizures (HCC)     simple complex partial seizure--last seizure in 2004    Urinary incontinence      Past Surgical History:   Procedure Laterality Date    INGUINAL HERNIA REPAIR      [place] LAP,PROSTATECTOMY,RADICAL,W/NERVE SPARE,INCL ROBOTIC N/A 5/23/2018    Procedure: Krunal Howell W ROBOTICS;  Surgeon: Ada Tipton MD;  [facility];  Service: Urology    PROSTATE BIOPSY  02/01/2018    Dr Millicent Wilburn  05/2018       Social History   Social History     Substance and Sexual Activity   Alcohol Use No     Social History     Substance and Sexual Activity   Drug Use No     Social History     Tobacco Use   Smoking Status Never Smoker   Smokeless Tobacco Never Used         Meds/Allergies     Current Outpatient Medications:     aspirin (ASPIRIN LOW DOSE) 81 MG tablet, Take 1 tablet by mouth daily, Disp: , Rfl:     calcium citrate-vitamin D (CITRACAL+D) 315-200 MG-UNIT per tablet, Take by mouth 2 (two) times a day  , Disp: , Rfl:     clotrimazole-betamethasone (LOTRISONE) 1-0 05 % cream, Apply 1 application topically 2 (two) times a day, Disp: , Rfl:     fexofenadine (ALLEGRA) 180 MG tablet, Take 1 tablet by mouth daily, Disp: , Rfl:     levETIRAcetam (KEPPRA) 1000 MG tablet, Take 1 tablet by mouth 2 (two) times a day, Disp: , Rfl:     lisinopril (ZESTRIL) 5 mg tablet, Take 1 tablet by mouth daily, Disp: , Rfl:     Melatonin 5 MG TABS, Take 5 mg by mouth daily at bedtime  , Disp: , Rfl:     Pediatric Multivitamins-Fl (MULTIVITAMINS/FL PO), Take 1 capsule by mouth daily, Disp: , Rfl:     simvastatin (ZOCOR) 40 mg tablet, Take 1 tablet by mouth, Disp: , Rfl:   Allergies   Allergen Reactions    Molds & Smuts      Other reaction(s): Respiratory Distress  Congestion, sore throat, coughing  seasonal    Other      Other reaction(s): Respiratory Distress  Congestion, sore throat, coughing  Review of Systems Constitutional: Positive for unexpected weight change (gained 5-10 lbs over the last year, since Lupron, despite being active)  Continues swimming 3-4 days per week, staying active, good appetite   HENT: Negative  Eyes: Negative  Respiratory: Negative  Cardiovascular: Negative  Gastrointestinal: Negative  Endocrine: Positive for heat intolerance (still getting up to 3 hot flashes a night since Lupron)  Genitourinary: Negative  Denies dysuria, incontinence, nocturia x0,    Musculoskeletal: Positive for arthralgias (left knee aches )  Skin: Negative  Allergic/Immunologic: Negative  Neurological: Negative  Hematological: Negative  Psychiatric/Behavioral: Positive for sleep disturbance (wakes up with hot flashes during the night)         OBJECTIVE:   /76 (BP Location: Right arm)   Pulse 76   Temp 97 7 °F (36 5 °C) (Temporal)   Resp 16 Wt 92 6 kg (204 lb 3 2 oz)   SpO2 98%   BMI 27 69 kg/m²   Pain Assessment:  0  ECOG/Zubrod/WHO: 1 - Symptomatic but completely ambulatory    Physical Exam GENERAL:  Appears stated age, in no apparent distress  Alert and oriented  HEENT:  Normocephalic, atraumatic   extraocular muscles intact  Oral mucosa moist   PULMONARY:  Respirations unlabored  CARDIOVASCULAR:  Regular rate  ABDOMEN:  Soft, nondistended  NEUROLOGIC: Moving all extremities, No focal deficits noted  EXTREMITIES: no clubbing, cyanosis, or edema  PSYCHIATRIC: normal mood and affect  Appropriate thought content and judgement  RESULTS    Lab Results: No results found for this or any previous visit (from the past 672 hour(s))  Imaging Studies:No results found  Assessment/Plan:  No orders of the defined types were placed in this encounter  Dulce Shen is a 71y o  year old male with pretreatment PSA of 12 2, pT2Nx, GS 4+3=7, adenocarcinoma of the prostate, status post radical prostatectomy 5/23/18, with rising PSA post prostatectomy, now status post post prostatectomy RT with 6 months lupron  Patient is doing well, with last PSA 12/2/19, <0 01  He continues with hotflashes, and has had some weight gain with ADT  He otherwise denies any /GI side effects at this time  We reviewed 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

## 2020-01-28 ENCOUNTER — OFFICE VISIT (OUTPATIENT)
Dept: OBGYN CLINIC | Facility: OTHER | Age: 70
End: 2020-01-28
Payer: MEDICARE

## 2020-01-28 VITALS
SYSTOLIC BLOOD PRESSURE: 150 MMHG | WEIGHT: 200 LBS | DIASTOLIC BLOOD PRESSURE: 89 MMHG | HEIGHT: 73 IN | BODY MASS INDEX: 26.51 KG/M2 | HEART RATE: 65 BPM

## 2020-01-28 DIAGNOSIS — M17.12 PRIMARY OSTEOARTHRITIS OF LEFT KNEE: ICD-10-CM

## 2020-01-28 DIAGNOSIS — M25.562 ACUTE PAIN OF LEFT KNEE: Primary | ICD-10-CM

## 2020-01-28 PROCEDURE — 99203 OFFICE O/P NEW LOW 30 MIN: CPT | Performed by: ORTHOPAEDIC SURGERY

## 2020-01-28 RX ORDER — MULTIVITAMIN
1 TABLET ORAL DAILY
COMMUNITY

## 2020-01-28 NOTE — PROGRESS NOTES
Assessment:       1  Acute pain of left knee    2  Primary osteoarthritis of left knee          Plan:        Clinical findings discussed with patient today - consistent with flare of left knee osteoarthritis  Symptoms are well controlled with OTC medications at this time  Discussed that Xray would only confirm our findings but are not necessary at this point - patient agreeable to hold off for now  Instructed to start extra strength tylenol daily for the next 5 days, then take only as needed  Additionally referred to formal physical therapy and given home knee exercises while he sets up the appointment  Furthermore, discussed next step treatment which would include cortisone injection with radiographs if there is no improvement of symptoms and surgical intervention if conservative measures fail to improve symptoms  Subjective:       Patient ID: Kaylie Garcia is a 71 y o  male  Chief Complaint:  Left knee pain    HPI  49-year-old male here today in regards to referral from Dr Cleveland Locus [facility]) for initial evaluation of left knee pain  Patient reports approximately 3 weeks of pain without precipitating injury  Pain located over anteromedial and anterolateral side  Pain is 1-2/10 constantly, but worsens to 8/10 when climbing stairs  Reports mild swelling compared to right knee  Denies clicking/popping, numbness/tingling, weakness, limited range of motion  Has tried ibuprofen with good relief  No prior injuries or surgeries of the left knee  Social History     Occupational History    Not on file   Tobacco Use    Smoking status: Never Smoker    Smokeless tobacco: Never Used   Substance and Sexual Activity    Alcohol use: No    Drug use: No    Sexual activity: Not on file      Review of Systems   Constitutional: Negative for chills, fever and unexpected weight change  HENT: Negative for hearing loss, nosebleeds and sore throat      Eyes: Negative for pain, redness and visual disturbance  Respiratory: Negative for cough, shortness of breath and wheezing  Cardiovascular: Negative for chest pain, palpitations and leg swelling  Gastrointestinal: Negative for abdominal pain, nausea and vomiting  Endocrine: Negative for polyphagia and polyuria  Genitourinary: Negative for dysuria and hematuria  Musculoskeletal:        As per HPI   Skin: Negative for rash and wound  Neurological:        As per HPI   Psychiatric/Behavioral: Negative for decreased concentration and suicidal ideas  The patient is not nervous/anxious  Objective:     Ortho ExamPhysical Exam   Constitutional: He is oriented to person, place, and time  He appears well-developed and well-nourished  No distress  HENT:   Head: Normocephalic and atraumatic  Right Ear: External ear normal    Left Ear: External ear normal    Eyes: Conjunctivae are normal  No scleral icterus  Neck: Normal range of motion  Neck supple  Cardiovascular: Normal rate  Pulmonary/Chest: Effort normal  No respiratory distress  Abdominal: Soft  Neurological: He is alert and oriented to person, place, and time  Skin: Skin is warm and dry  He is not diaphoretic  Psychiatric: His behavior is normal        Ortho Exam:  LEFT KNEE:  Erythema: no  Swelling: no  Increased Warmth: no  Tenderness: +medial and lateral tibiofemoral compartment  Flexion: 140 degrees bilaterally  Extension: 0 degrees bilaterally  Patellar Apprehension: negative  Patellar Grind Crow's: + bilaterally  Lachman's: negative  Drawer: negative  Varus laxity: negative  Valgus laxity: negative  [facility]: negative     I interviewed, took the history and examined the patient  I discussed the case with the Resident and reviewed the Residents note , prescribed medications, and orders placed  I supervised the Resident and I agree with the Resident management plan as it was presented to me  I was present in the clinic and examined the patient      Rozina Almendarez MD 01/29/20

## 2020-01-28 NOTE — PATIENT INSTRUCTIONS
Knee Exercises   AMBULATORY CARE:   What you need to know about knee exercises:  Knee exercises help strengthen the muscles around your knee  Strong muscles can help reduce pain and decrease your risk of future injury  Knee exercises also help you heal after an injury or surgery  · Start slow  These are beginning exercises  Ask your healthcare provider if you need to see a physical therapist for more advanced exercises  As you get stronger, you may be able to do more sets of each exercise or add weights  · Stop if you feel pain  It is normal to feel some discomfort at first  Regular exercise will help decrease your discomfort over time  · Do the exercises on both legs  Do this so both knees remain strong  · Warm up before you do knee exercises  Walk or ride a stationary bike for 5 or 10 minutes to warm your muscles  How to perform knee stretches safely:  Always stretch before you do strengthening exercises  Do these stretching exercises again after you do the strengthening exercises  Do these stretches 4 or 5 days a week, or as directed  · Standing calf stretch: Face a wall and place both palms flat on the wall, or hold the back of a chair for balance  Keep a slight bend in your knees  Take a big step backward with one leg  Keep your other leg directly under you  Keep both heels flat and press your hips forward  Hold the stretch for 30 seconds, and then relax for 30 seconds  Switch legs  Repeat 2 or 3 times on each leg  · Standing quadriceps stretch:  Stand and place one hand against a wall or hold the back of a chair for balance  With your weight on one leg, bend your other leg and grab your ankle  Bring your heel toward your buttocks  Hold the stretch for 30 to 60 seconds  Switch legs  Repeat 2 or 3 times on each leg  · Sitting hamstring stretch:  Sit with both legs straight in front of you  Do not point or flex your toes   Place your palms on the floor and slide your hands forward until you feel the stretch  Do not round your back  Hold the stretch for 30 seconds  Repeat 2 or 3 times  How to perform knee strengthening exercises safely:  Do these exercises 4 or 5 days a week, or as directed  · Standing half squats:  Stand with your feet shoulder-width apart  Lean your back against a wall or hold the back of a chair for balance, if needed  Slowly sit down about 10 inches, as if you are going to sit in a chair  Your body weight should be mostly over your heels  Hold the squat for 5 seconds, then rise to a standing position  Do 3 sets of 10 squats to strengthen your buttocks and thighs  · Standing hamstring curls: Face a wall and place both palms flat on the wall, or hold the back of a chair for balance  With your weight on one leg, lift your other foot as close to your buttocks as you can  Hold for 5 seconds and then lower your leg  Do 2 sets of 10 curls on each leg  This exercise strengthens the muscles in the back of your thigh  · Standing calf raises:  Face a wall and place both palms flat on the wall, or hold the back of a chair for balance  Stand up straight, and do not lean  Place all your weight on one leg by lifting the other foot off the floor  Raise the heel of the foot that is on the floor as high as you can and then lower it  Do 2 sets of 10 calf raises on each leg to strengthen your calf muscles  · Straight leg lifts:  Lie on your stomach with straight legs  Fold your arms in front of you and rest your head in your arms  Tighten your leg muscles and raise one leg as high as you can  Hold for 5 seconds, then lower your leg  Do 2 sets of 10 lifts on each leg to strengthen your buttocks  · Sitting leg lifts:  Sit in a chair  Slowly straighten and raise one leg  Squeeze your thigh muscles and hold for 5 seconds  Relax and return your foot to the floor  Do 2 sets of 10 lifts on each leg   This helps strengthen the muscles in the front of your thigh  Contact your healthcare provider if:   · You have new pain or your pain becomes worse  · You have questions or concerns about your condition or care  © 2017 [employer] Information is for End User's use only and may not be sold, redistributed or otherwise used for commercial purposes  All illustrations and images included in CareNotes® are the copyrighted property of [employer]  or [employer]  The above information is an  only  It is not intended as medical advice for individual conditions or treatments  Talk to your doctor, nurse or pharmacist before following any medical regimen to see if it is safe and effective for you

## 2020-02-03 ENCOUNTER — EVALUATION (OUTPATIENT)
Dept: PHYSICAL THERAPY | Facility: OTHER | Age: 70
End: 2020-02-03
Payer: MEDICARE

## 2020-02-03 DIAGNOSIS — M25.562 ACUTE PAIN OF LEFT KNEE: ICD-10-CM

## 2020-02-03 DIAGNOSIS — M17.12 PRIMARY OSTEOARTHRITIS OF LEFT KNEE: ICD-10-CM

## 2020-02-03 PROCEDURE — 97110 THERAPEUTIC EXERCISES: CPT | Performed by: PHYSICAL THERAPIST

## 2020-02-03 PROCEDURE — 97161 PT EVAL LOW COMPLEX 20 MIN: CPT | Performed by: PHYSICAL THERAPIST

## 2020-02-03 NOTE — PROGRESS NOTES
PT Evaluation     Today's date: 2/3/2020  Patient name: Kaylie Garcia  : 1950  MRN: [id number]  Referring provider: Alexis Daly MD  Dx: No diagnosis found  Assessment  Assessment details: Kaylie Garcia is a pleasant 71 y o  male who presents with L knee pain about a month ago his primary complaint is walking up stair squatting or getting up from a chair  patient reported that  He has never had surgery or a simmplar pain no releif with modalities  He reported no catcing ot giving way of the knee but at times the pain is so high he has to shift off it quickly    Pain is post knee and joint line  HEP issued and POC reviewed  Impairments: abnormal coordination, abnormal muscle firing, abnormal or restricted ROM, abnormal movement, activity intolerance, difficulty understanding, impaired physical strength, lacks appropriate home exercise program, pain with function and poor body mechanics    Symptom irritability: moderateUnderstanding of Dx/Px/POC: good   Prognosis: good    Goals  Short Term:  Pt will report decreased levels of pain by at least 2 subjective ratings in 4 weeks  Pt will demonstrate improved ROM by at least 10 degrees in 4 weeks  Pt will demonstrate improved strength by 1/2 grade  Long Term:   Pt will be independent in their HEP in 8 weeks  Pt will be be pain free with IADL's  Pt will demonstrate improved FOTO, > 80         Plan  Plan details: Prognosis above is given PT services 2x/week tapering to 1x/week over the next 3 months and home program adherence    Patient would benefit from: skilled physical therapy  Planned modality interventions: thermotherapy: hydrocollator packs  Planned therapy interventions: activity modification, joint mobilization, manual therapy, motor coordination training, neuromuscular re-education, patient education, self care, therapeutic activities, therapeutic exercise, graded activity and home exercise program  Frequency: 2x week  Treatment plan discussed with: patient        Subjective Evaluation    Pain  At best pain ratin  At worst pain ratin  Location: L knee  Objective     General Comments:      Knee Comments  Knee: post drawer = - Lachmans test=-     Valgus stress test= - varus stress test =   - Kamron test   = lateral knee pain + and crepitus no catching  joint line tenderness= lateral  Patella grind test= increased Patella mobility test= tightness       nathalie's test= -    apprehension test= patella lift test=  step up and over test= pain improved with tape medial glide 5/10   functional squat= pain improved with tape  Thessaly test =-  Knee MMT Flexion: 4/5 L  extension:  Hip MMT:  4/5 L   Knee ROM flex and extension:  WFL     tightnes HS and qaud B/L               Hip:  scour=    - cristhian = _        obers=  -     piriformis test=    mild tightness                Precautions: sharp pain with stairs  Manual  2 3            Patella ROM  MJ            Khai washburn[pping medial glide  MJ                                                       Exercise Diary                                        Bike              LAQ              S:R flex/abd             ER into wall R stance leg                Anna kimball              biodex balance                                                                                                                                                    Modalities

## 2020-02-06 ENCOUNTER — OFFICE VISIT (OUTPATIENT)
Dept: PHYSICAL THERAPY | Facility: OTHER | Age: 70
End: 2020-02-06
Payer: MEDICARE

## 2020-02-06 DIAGNOSIS — M17.12 PRIMARY OSTEOARTHRITIS OF LEFT KNEE: ICD-10-CM

## 2020-02-06 DIAGNOSIS — M25.562 ACUTE PAIN OF LEFT KNEE: Primary | ICD-10-CM

## 2020-02-06 PROCEDURE — 97110 THERAPEUTIC EXERCISES: CPT

## 2020-02-06 PROCEDURE — 97140 MANUAL THERAPY 1/> REGIONS: CPT

## 2020-02-06 NOTE — PROGRESS NOTES
Daily Note     Today's date: 2020  Patient name: Milka Jiménez  : 1950  MRN: [id number]  Referring provider: Dipti Warner MD  Dx:   Encounter Diagnosis     ICD-10-CM    1  Acute pain of left knee M25 562    2  Primary osteoarthritis of left knee M17 12                   Subjective: Pt states he is feeling good as he has been doing his exercises at home  Pt state he didn't have more pain after his first session but he could tell "he did something"  Pt state he has to be out by 8 am d/t having to be at work  Objective: See treatment diary below      Assessment: Tolerated treatment well with no increase in pain  Pt required VC through session to help improve upon form  HEP was reviewed  Pt had a decreased in discomfort with taping  Pt will continue to progress with exerices as tolerated  Patient would benefit from continued PT      Plan: Continue per plan of care  Precautions: sharp pain with stairs  Manual  2 3 2/           Patella ROM  MJ CF           Khai ta[pping medial glide  MJ CF                                                      Exercise Diary                                        Bike   8 mins           LAQ   2  x10            S:R flex/abd  10x ea            ER into wall R stance leg     nv           Clamshells   nv           Bridges   5" x 10            hamcurls   10x            biodex balance                                                                                                                                                    Modalities

## 2020-02-11 ENCOUNTER — OFFICE VISIT (OUTPATIENT)
Dept: PHYSICAL THERAPY | Facility: OTHER | Age: 70
End: 2020-02-11
Payer: MEDICARE

## 2020-02-11 DIAGNOSIS — M25.562 ACUTE PAIN OF LEFT KNEE: Primary | ICD-10-CM

## 2020-02-11 DIAGNOSIS — M17.12 PRIMARY OSTEOARTHRITIS OF LEFT KNEE: ICD-10-CM

## 2020-02-11 PROCEDURE — 97140 MANUAL THERAPY 1/> REGIONS: CPT | Performed by: PHYSICAL THERAPIST

## 2020-02-11 PROCEDURE — 97110 THERAPEUTIC EXERCISES: CPT | Performed by: PHYSICAL THERAPIST

## 2020-02-11 PROCEDURE — 97112 NEUROMUSCULAR REEDUCATION: CPT | Performed by: PHYSICAL THERAPIST

## 2020-02-11 NOTE — PROGRESS NOTES
Daily Note     Today's date: 2020  Patient name: Lee Perez  : 1950  MRN: [id number]  Referring provider: Ninfa Copeland MD  Dx:   No diagnosis found  Subjective: less pain with tape on manuals added mobilizations patella  Objective: See treatment diary below      Assessment: Tolerated treatment well with no increase in pain      Patient would benefit from continued PT      Plan: Continue per plan of care  Precautions: sharp pain with stairs  Manual  2 3  2 11          Patella ROM  MJ CF  2 11 gr 4 mmedial lgides           Khai ta[pping medial glide  MJ CF MJ          Prone quad stretch   MJ                                         Exercise Diary    2 11                                    Bike   8 mins 8min           LAQ   2  x10  10x2           S:R flex/abd  10x ea            ER into wall R stance leg     nv 5''x10          Clamshells   nv gtb 20B/L           Bridges   5" x 10  5''x10          hamcurls   10x  #5 20           biodex balance              LAQ   #5 20           Rocker board    20each                                                                                                                       Modalities

## 2020-02-18 ENCOUNTER — OFFICE VISIT (OUTPATIENT)
Dept: PHYSICAL THERAPY | Facility: OTHER | Age: 70
End: 2020-02-18
Payer: MEDICARE

## 2020-02-18 DIAGNOSIS — M17.12 PRIMARY OSTEOARTHRITIS OF LEFT KNEE: ICD-10-CM

## 2020-02-18 DIAGNOSIS — M25.562 ACUTE PAIN OF LEFT KNEE: Primary | ICD-10-CM

## 2020-02-18 PROCEDURE — 97112 NEUROMUSCULAR REEDUCATION: CPT | Performed by: PHYSICAL THERAPIST

## 2020-02-18 PROCEDURE — 97140 MANUAL THERAPY 1/> REGIONS: CPT | Performed by: PHYSICAL THERAPIST

## 2020-02-18 PROCEDURE — 97110 THERAPEUTIC EXERCISES: CPT | Performed by: PHYSICAL THERAPIST

## 2020-02-18 NOTE — PROGRESS NOTES
Daily Note     Today's date: 2020  Patient name: Luan Mcdaniel  : 1950  MRN: [id number]  Referring provider: Brayan Dodd MD  Dx:   Encounter Diagnosis     ICD-10-CM    1  Acute pain of left knee M25 562    2  Primary osteoarthritis of left knee M17 12                   Subjective: patient reported no change with PT continues to onl have pain after prolonged sitting or stairs  We again discussed that with his issues PFP and OA it will take some time 4-6 weeks for use to notice improvements wit hthe strengthen  Objective: See treatment diary below      Assessment: Tolerated treatment well with no increase in pain      Patient would benefit from continued PT      Plan: Continue per plan of care  Precautions: sharp pain with stairs  Manual  2 3  2 11 2 18         Patella ROM  MJ CF  2 11 gr 4 mmedial lgides  MJ nmeidla glide          Ridley ta[pping medial glide  MJ CF MJ NP          Prone quad stretch   MJ  MJ                                       Exercise Diary    2 11 2 18                                      Bike   8 mins 8min  8min           LAQ   2  x10  10x2  10x2 #3 #5 Nv and hamcurl          S:R flex/abd  10x ea             ER into wall R stance leg     nv 5''x10 5''x10          Clamshells   nv gtb 20B/L  btb 20B/L           Bridges   5" x 10  5''x10 5''x10          hamcurls   10x  #5 20  #5 20           biodex balance               LAQ   #5 20  #5 20           Rocker board    20each  20each           biodex balance     lv 10                         Hamstring and quad stretch    10''x10                                                                                     Modalities

## 2020-02-25 ENCOUNTER — OFFICE VISIT (OUTPATIENT)
Dept: PHYSICAL THERAPY | Facility: OTHER | Age: 70
End: 2020-02-25
Payer: MEDICARE

## 2020-02-25 DIAGNOSIS — M17.12 PRIMARY OSTEOARTHRITIS OF LEFT KNEE: ICD-10-CM

## 2020-02-25 DIAGNOSIS — M25.562 ACUTE PAIN OF LEFT KNEE: Primary | ICD-10-CM

## 2020-02-25 PROCEDURE — 97110 THERAPEUTIC EXERCISES: CPT | Performed by: PHYSICAL THERAPIST

## 2020-02-25 PROCEDURE — 97112 NEUROMUSCULAR REEDUCATION: CPT | Performed by: PHYSICAL THERAPIST

## 2020-02-25 PROCEDURE — 97140 MANUAL THERAPY 1/> REGIONS: CPT | Performed by: PHYSICAL THERAPIST

## 2020-02-25 NOTE — PROGRESS NOTES
Daily Note     Today's date: 2020  Patient name: Rajat Pierre  : 1950  MRN: [id number]  Referring provider: Kelley Pickett MD  Dx:   Encounter Diagnosis     ICD-10-CM    1  Acute pain of left knee M25 562    2  Primary osteoarthritis of left knee M17 12                   Subjective:  Patient did report some improvements in pain with swimming  Less pain with squatting with MWM     Objective: See treatment diary below      Assessment: Tolerated treatment well with no increase in pain      Patient would benefit from continued PT      Plan: Continue per plan of care  Precautions: sharp pain with stairs  Manual  2 3  2 11 2 18 2 25        Patella ROM  MJ CF  2 11 gr 4 mmedial lgides  MJ nmeidla glide  MJ        Khai ta[pping medial glide  MJ CF MJ NP          Prone quad stretch   MJ  MJ MJ        MWM sqaut      MJ                         Exercise Diary    2 11 2 18 2 25                                     Bike   8 mins 8min  8min  10min          LAQ   2  x10  10x2  10x2 #3 #5 Nv and hamcurl #5 30 x          S:R flex/abd  10x ea             ER into wall R stance leg     nv 5''x10 5''x10 5''x10          Clamshells   nv gtb 20B/L  btb 20B/L  btb 20 B/L          Bridges   5" x 10  5''x10 5''x10 5''x20          hamcurls   10x  #5 20  #5 20  #5 20          biodex balance               LAQ   #5 20  #5 20  #5 20          Rocker board    20each  20each  20each          biodex balance     lv 10  lv10                        Hamstring and quad stretch    10''x10  10'x10          Mini sqauts      TRX 30          Retro lunge     TRX NV          Star balance     NV                                          Modalities

## 2020-03-03 ENCOUNTER — OFFICE VISIT (OUTPATIENT)
Dept: PHYSICAL THERAPY | Facility: OTHER | Age: 70
End: 2020-03-03
Payer: MEDICARE

## 2020-03-03 DIAGNOSIS — M25.562 ACUTE PAIN OF LEFT KNEE: Primary | ICD-10-CM

## 2020-03-03 DIAGNOSIS — M17.12 PRIMARY OSTEOARTHRITIS OF LEFT KNEE: ICD-10-CM

## 2020-03-03 PROCEDURE — 97112 NEUROMUSCULAR REEDUCATION: CPT

## 2020-03-03 PROCEDURE — 97110 THERAPEUTIC EXERCISES: CPT

## 2020-03-03 PROCEDURE — 97140 MANUAL THERAPY 1/> REGIONS: CPT

## 2020-03-03 NOTE — PROGRESS NOTES
Daily Note     Today's date: 3/3/2020  Patient name: Georgi Daley  : 1950  MRN: [id number]  Referring provider: Kenton Walls MD  Dx: Acute pain of left knee    Start Time:   Stop Time:  on 1 with PT JRS 15:10 - 16:05  Total time in clinic (min): 55 minutes    Subjective: Patient continues to report pain with ascending and descending stairs  He also states he has right knee pain following prolonged sitting  Patient denies pain with prolonged standing and states that swimming provided some relief  Patient had slight relief from PFJ taping for medial glide and medial tilt of left patella  Objective: See treatment diary below      Assessment: Tolerated treatment well  Patient was able to complete CKC quadriceps strengthening in modified ROM with no pain  Patient will continue to benefit from PT  Plan: Continue plan of care  Precautions: sharp pain with stairs  Manual  2 3 2/6 2 11 2 18 2 25 3/3      Patella ROM  MJ CF  2 11 gr 4 mmedial lgides  MJ nmeidla glide  MJ G3 sustained medial glide      Khai taping medial glide  MJ CF MJ NP   JRS medial glide and tilt      Prone quad stretch   MJ  MJ MJ JRS 3 x 30"      MWM sqaut      MJ NP                      Exercise Diary   / 2 11 2 18 2 25 3/3                                    Bike   8 mins 8min  8min  10min  8 min        SLR flex/abd  10x ea             ER into wall R stance leg     nv 5''x10 5''x10 5''x10  5" x 10 B        Clamshells   nv gtb 20B/L  btb 20B/L  btb 20 B/L  BTB x 30 B        Bridges   5" x 10  5''x10 5''x10 5''x20  5" x 20 on PB        hamcurls   10x  #5 20  #5 20  #5 20  2# x 30        LAQ   #5 20  #5 20  #5 20  5# x 30        Rocker board    20each  20each  20each  20 each A/P and M/L        biodex balance     lv 10  lv10  Level 10 x 3        BAPS Board Circles      CW/CCW x 30 each        Hamstring and quad stretch    10''x10  10'x10  3 x 30" each        Mini sqauts      TRX 30  10 x 10" Retro lunge     TRX NV  NP        Star balance     NV  NV        Step Downs 4" step with 3 Super Sliders on ground      2 x 10                          Modalities

## 2020-03-10 ENCOUNTER — OFFICE VISIT (OUTPATIENT)
Dept: PHYSICAL THERAPY | Facility: OTHER | Age: 70
End: 2020-03-10
Payer: MEDICARE

## 2020-03-10 DIAGNOSIS — M17.12 PRIMARY OSTEOARTHRITIS OF LEFT KNEE: ICD-10-CM

## 2020-03-10 DIAGNOSIS — M25.562 ACUTE PAIN OF LEFT KNEE: Primary | ICD-10-CM

## 2020-03-10 PROCEDURE — 97140 MANUAL THERAPY 1/> REGIONS: CPT | Performed by: PHYSICAL THERAPIST

## 2020-03-10 PROCEDURE — 97110 THERAPEUTIC EXERCISES: CPT | Performed by: PHYSICAL THERAPIST

## 2020-03-10 PROCEDURE — 97112 NEUROMUSCULAR REEDUCATION: CPT | Performed by: PHYSICAL THERAPIST

## 2020-03-10 NOTE — PROGRESS NOTES
Daily Note     Today's date: 3/10/2020  Patient name: Sebastian Wiggins  : 1950  MRN: [id number]  Referring provider: Lela Stallworth MD  Dx: Acute pain of left knee               Subjective: Patient continues to report pain with ascending and descending stairs  He also states he has right knee pain following prolonged sitting  Patient denies pain with prolonged standing and states that swimming provided some relief  Patient had slight relief from PFJ taping for medial glide and medial tilt of left patella  Objective: See treatment diary below      Assessment: Tolerated treatment well  Patient was able to complete CKC quadriceps strengthening in modified ROM with no pain  Patient will continue to benefit from PT  Plan: Continue plan of care  Precautions: sharp pain with stairs  Manual  2 3 2/6 2 11 2 18 2 25 3/3 3 10     Patella ROM  MJ CF  2 11 gr 4 mmedial lgides  MJ nmeidla glide  MJ G3 sustained medial glide [id number]      Ridley taping medial glide  MJ CF MJ NP   JRS medial glide and tilt MJ Medal glide and tilt     Prone quad stretch   MJ  MJ MJ JRS 3 x 30" MJ      MWM sqaut      MJ NP                        Exercise Diary    2 11 2 18 2 25 3/3 3/10                                      Bike   8 mins 8min  8min  10min  8 min 8 min        SLR flex/abd  10x ea              ER into wall R stance leg     nv 5''x10 5''x10 5''x10  5" x 10 B 5" x 10 B        Clamshells   nv gtb 20B/L  btb 20B/L  btb 20 B/L  BTB x 30 B BTB x 30 B        Bridges   5" x 10  5''x10 5''x10 5''x20  5" x 20 on PB 5" x 25on PB        hamcurls   10x  #5 20  #5 20  #5 20  2# x 30 2# x 30        LAQ   #5 20  #5 20  #5 20  5# x 30 5# x 30        Rocker board    20each  20each  20each  20 each A/P and M/L 20 each A/P and M/L        biodex balance     lv 10  lv10  Level 10 x 3 Level 10 x 3        BAPS Board Circles      CW/CCW x 30 each CW/CCW x 30 each        Hamstring and quad stretch    10''x10  10'x10  3 x 30" each 3 x 30" each        Mini sqauts      TRX 30  10 x 10"  10 x 10"         Retro lunge     TRX NV  NP NP        Star balance     NV  NV 5x          Step Downs 4" step with 3 Super Sliders on ground      2 x 10         Step up        6iin 15             Modalities

## 2020-03-11 ENCOUNTER — TELEPHONE (OUTPATIENT)
Dept: UROLOGY | Facility: AMBULATORY SURGERY CENTER | Age: 70
End: 2020-03-11

## 2020-03-11 NOTE — TELEPHONE ENCOUNTER
LVM to inform pt that appt on 3/17/20 with Zenobia Flower needed to be moved to 4/10 @ 8:15am  Pt needs to confirm

## 2020-03-16 ENCOUNTER — OFFICE VISIT (OUTPATIENT)
Dept: UROLOGY | Facility: CLINIC | Age: 70
End: 2020-03-16
Payer: MEDICARE

## 2020-03-16 VITALS
BODY MASS INDEX: 26.64 KG/M2 | WEIGHT: 201 LBS | DIASTOLIC BLOOD PRESSURE: 74 MMHG | HEART RATE: 71 BPM | SYSTOLIC BLOOD PRESSURE: 138 MMHG | HEIGHT: 73 IN

## 2020-03-16 DIAGNOSIS — C61 PROSTATE CANCER (HCC): Primary | ICD-10-CM

## 2020-03-16 DIAGNOSIS — N52.31 ERECTILE DYSFUNCTION AFTER RADICAL PROSTATECTOMY: ICD-10-CM

## 2020-03-16 PROCEDURE — 99213 OFFICE O/P EST LOW 20 MIN: CPT | Performed by: UROLOGY

## 2020-03-16 RX ORDER — TADALAFIL 20 MG/1
20 TABLET ORAL DAILY PRN
Qty: 3 TABLET | Refills: 6 | Status: SHIPPED | OUTPATIENT
Start: 2020-03-16 | End: 2021-03-25

## 2020-03-16 NOTE — PROGRESS NOTES
Progress Note - Urology  Katie Patel 71 y o  male MRN: [id number]  Encounter: [id number]      Chief Complaint:   Chief Complaint   Patient presents with    Prostate Cancer     3 Month F/U / PSA <0 01 ( 03/03/20)       HPI:     this 51-year-old male had a robotic prostatectomy in May of 2018  Unfortunately, he had a PSA recurrence and followed up with radiation therapy combined with androgen blockade  His PSA remains undetectable at this time at less than 0 01, however he still has symptoms of androgen deprivation  Specifically bothered by hot flashes and chills  His urinary flow and control is good at this time  He has had weight gain  He also has sexual dysfunction and has not tried any options of management at this time  We did review multiple options for him to try    He is also still following with Radiation Oncology    MEDS:    Current Outpatient Medications:     aspirin (ASPIRIN LOW DOSE) 81 MG tablet, Take 1 tablet by mouth daily, Disp: , Rfl:     calcium citrate-vitamin D (CITRACAL+D) 315-200 MG-UNIT per tablet, Take by mouth 2 (two) times a day  , Disp: , Rfl:     clotrimazole-betamethasone (LOTRISONE) 1-0 05 % cream, Apply 1 application topically 2 (two) times a day , Disp: , Rfl:     fexofenadine (ALLEGRA) 180 MG tablet, Take 1 tablet by mouth daily, Disp: , Rfl:     levETIRAcetam (KEPPRA) 1000 MG tablet, Take 1 tablet by mouth 2 (two) times a day, Disp: , Rfl:     lisinopril (ZESTRIL) 5 mg tablet, Take 1 tablet by mouth daily, Disp: , Rfl:     Melatonin 5 MG TABS, Take 5 mg by mouth daily at bedtime  , Disp: , Rfl:     Multiple Vitamin (MULTIVITAMIN) tablet, Take 1 tablet by mouth daily, Disp: , Rfl:     simvastatin (ZOCOR) 40 mg tablet, Take 1 tablet by mouth, Disp: , Rfl:       PMH:  Past Medical History:   Diagnosis Date    Basal cell carcinoma     Basal cell carcinoma     Hyperlipidemia     Hypertension     Hypertension     last assesed 12-22-11    PONV (postoperative nausea and vomiting)     Prostate cancer ([facility] [address] )     Seizures (HCC)     simple complex partial seizure--last seizure in 2004    Urinary incontinence          PSH  Past Surgical History:   Procedure Laterality Date    INGUINAL HERNIA REPAIR      NC LAP,PROSTATECTOMY,RADICAL,W/NERVE SPARE,INCL ROBOTIC N/A 5/23/2018    Procedure: Marty Layer;  Surgeon: Lynn Christianson MD;  Location: [facility];  Service: Urology    PROSTATE BIOPSY  02/01/2018    Dr Shawna Zacarias  05/2018         FH  Family History   Problem Relation Age of Onset    No Known Problems Mother     Cancer Father     Cancer Brother         SH  Social History     Socioeconomic History    Marital status: /Civil Union     Spouse name: None    Number of children: None    Years of education: None    Highest education level: None   Occupational History    None   Social Needs    Financial resource strain: None    Food insecurity:     Worry: None     Inability: None    Transportation needs:     Medical: None     Non-medical: None   Tobacco Use    Smoking status: Never Smoker    Smokeless tobacco: Never Used   Substance and Sexual Activity    Alcohol use: No    Drug use: No    Sexual activity: None   Lifestyle    Physical activity:     Days per week: None     Minutes per session: None    Stress: None   Relationships    Social connections:     Talks on phone: None     Gets together: None     Attends Shinto service: None     Active member of club or organization: None     Attends meetings of clubs or organizations: None     Relationship status: None    Intimate partner violence:     Fear of current or ex partner: None     Emotionally abused: None     Physically abused: None     Forced sexual activity: None   Other Topics Concern    None   Social History Narrative    None          ROS:  Review of Systems      Vitals:  Blood pressure 138/74, pulse 71, height 6' 1" (1 854 m), weight 91 2 kg (201 lb)  Physical Exam:    28-year-old male appearing stated age in no acute distress  The abdomen is soft  Appreciate no masses  The inguinal area reveals no adenopathy  No hernia defects were noted  The scrotum is unremarkable both testes are palpated the left testis is mildly atrophic  On rectal examination there is normal sphincter tone  The prostate is surgically absent and I feel no mass or hardness  Lab, Imaging and other studies:  No results found for this or any previous visit (from the past 672 hour(s))  IMPRESSION:  1  Prostate cancer status post robotic prostatectomy and adjuvant radiation therapy   2  Erectile dysfunction secondary to management of prostate cancer    PLAN:   I will check a serum testosterone as he is still having hot flashes at this time  Options of management will be to continue conservative watch and wait verses additional testosterone supplements  We did discuss the use of testosterone in the face of his prostate cancer with a PSA recurrence  In addition we will give him a trial of Cialis for the erectile dysfunction  We had a discussion on management of this particular issue as well  Including the use of vacuum devices and Tri Mix injections  And finally possibly the use of surgical intervention with a IPP      Please note :  Voice dictation software has been used to create this document  There may be inadvertent transcription errors

## 2020-03-17 ENCOUNTER — OFFICE VISIT (OUTPATIENT)
Dept: PHYSICAL THERAPY | Facility: OTHER | Age: 70
End: 2020-03-17
Payer: MEDICARE

## 2020-03-17 DIAGNOSIS — M17.12 PRIMARY OSTEOARTHRITIS OF LEFT KNEE: ICD-10-CM

## 2020-03-17 DIAGNOSIS — M25.562 ACUTE PAIN OF LEFT KNEE: Primary | ICD-10-CM

## 2020-03-17 PROCEDURE — 97110 THERAPEUTIC EXERCISES: CPT | Performed by: PHYSICAL THERAPIST

## 2020-03-17 PROCEDURE — 97112 NEUROMUSCULAR REEDUCATION: CPT | Performed by: PHYSICAL THERAPIST

## 2020-03-17 NOTE — PROGRESS NOTES
Daily Note     Today's date: 3/17/2020  Patient name: Lauren Unger  : 1950  MRN: [id number]  Referring provider: Nannie Bumpers, MD  Dx: Acute pain of left knee               Subjective:less pain wih IADL;s and step but only mild  Objective: See treatment diary below      Assessment: Tolerated treatment well  Patient was able to complete CKC quadriceps strengthening in modified ROM with no pain  Patient will continue to benefit from PT  Plan: Continue plan of care  Precautions: sharp pain with stairs  Manual  2 3 2 2 11 2 18 2 25 3/3 3 17     Patella ROM  MJ CF  2 11 gr 4 mmedial lgides  MJ nmeidla glide  MJ G3 sustained medial glide MJGr3-4      Ridley taping medial glide  MJ CF MJ NP   JRS medial glide and tilt MJ Medal glide and tilt     Prone quad stretch   MJ  MJ MJ JRS 3 x 30" MJ      MWM sqaut      MJ NP                        Exercise Diary    2 11 2 18 2 25 33 317                                      Bike   8 mins 8min  8min  10min  8 min 8 min        SLR flex/abd  10x ea              ER into wall R stance leg     nv 5''x10 5''x10 5''x10  5" x 10 B 5" x 10 B        Clamshells   nv gtb 20B/L  btb 20B/L  btb 20 B/L  BTB x 30 B BTB x 30 B        Bridges   5" x 10  5''x10 5''x10 5''x20  5" x 20 on PB 5" x 25on PB        hamcurls   10x  #5 20  #5 20  #5 20  2# x 30 2# x 30        LAQ   #5 20  #5 20  #5 20  5# x 30 5# x 30        Rocker board    20each  20each  20each  20 each A/P and M/L 20 each A/P and M/L        biodex balance     lv 10  lv10  Level 10 x 3 Level 10 x 3        BAPS Board Circles      CW/CCW x 30 each CW/CCW x 30 each        Hamstring and quad stretch    10''x10  10'x10  3 x 30" each 3 x 30" each        Mini sqauts      TRX 30  10 x 10"  10 x 10"         Retro lunge     TRX NV  NP NP        Star balance     NV  NV 5x          Step Downs 4" step with 3 Super Sliders on ground      2 x 10         Step up        6iin 15             Modalities

## 2020-03-24 ENCOUNTER — OFFICE VISIT (OUTPATIENT)
Dept: PHYSICAL THERAPY | Facility: OTHER | Age: 70
End: 2020-03-24
Payer: MEDICARE

## 2020-03-24 DIAGNOSIS — M25.562 ACUTE PAIN OF LEFT KNEE: Primary | ICD-10-CM

## 2020-03-24 DIAGNOSIS — M17.12 PRIMARY OSTEOARTHRITIS OF LEFT KNEE: ICD-10-CM

## 2020-03-24 PROCEDURE — 97140 MANUAL THERAPY 1/> REGIONS: CPT | Performed by: PEDIATRICS

## 2020-03-24 PROCEDURE — 97112 NEUROMUSCULAR REEDUCATION: CPT | Performed by: PEDIATRICS

## 2020-03-24 PROCEDURE — 97110 THERAPEUTIC EXERCISES: CPT | Performed by: PEDIATRICS

## 2020-03-24 NOTE — PROGRESS NOTES
Daily Note     Today's date: 3/24/2020  Patient name: Venus Gregory  : 1950  MRN: [id number]  Referring provider: Colleen Ryan MD  Dx: Acute pain of left knee         1:1 w/ PTA EW for duration of treatment session  Subjective:Pt  States he notices some improvement since beginning PT  Continues to have some discomfort with ambulating stairs  States he has been going up and down stairs a lot secondary to getting ready to move out of his house  Objective: See treatment diary below      Assessment: Tolerated treatment well  Continued with TE as outlined  Some cuing for form required throughout  Will continue to benefit from quad strengthening  Patient will continue to benefit from PT  Plan: Continue plan of care  Precautions: sharp pain with stairs  Manual  2 3  2 11 2 18 2 25 3/3 3 17 3/24    Patella ROM  MJ CF  2 11 gr 4 mmedial lgides  MJ nmeidla glide  MJ G3 sustained medial glide MJGr3-4  EW    Ridley taping medial glide  MJ CF MJ NP   JRS medial glide and tilt MJ Medal glide and tilt EW    Prone quad stretch   MJ  MJ MJ JRS 3 x 30" MJ  EW    MWM sqaut      MJ NP                        Exercise Diary    2 11 2 18 2 25 3/3 3/17 3/24                                     Bike   8 mins 8min  8min  10min  8 min 8 min 8 min       SLR flex/abd  10x ea              ER into wall R stance leg     nv 5''x10 5''x10 5''x10  5" x 10 B 5" x 10 B 5" x 10       Clamshells   nv gtb 20B/L  btb 20B/L  btb 20 B/L  BTB x 30 B BTB x 30 B BTB  X 30B       Bridges   5" x 10  5''x10 5''x10 5''x20  5" x 20 on PB 5" x 25on PB 5" x 25  PB       hamcurls   10x  #5 20  #5 20  #5 20  2# x 30 2# x 30 5#  30x       LAQ   #5 20  #5 20  #5 20  5# x 30 5# x 30 5#  30x       Rocker board    20each  20each  20each  20 each A/P and M/L 20 each A/P and M/L 20 ea       biodex balance     lv 10  lv10  Level 10 x 3 Level 10 x 3 Level 10  x3       BAPS Board Circles      CW/CCW x 30 each CW/CCW x 30 each CW/CCW x 30 each       Hamstring and quad stretch    10''x10  10'x10  3 x 30" each 3 x 30" each manual       Mini sqauts      TRX 30  10 x 10"  10 x 10"  10 x 10"       Retro lunge     TRX NV  NP NP NP       Star balance     NV  NV 5x   5x       Step Downs 4" step with 3 Super Sliders on ground      2 x 10         Step up        6iin 15  6"  20x           Modalities

## 2020-03-31 ENCOUNTER — APPOINTMENT (OUTPATIENT)
Dept: PHYSICAL THERAPY | Facility: OTHER | Age: 70
End: 2020-03-31
Payer: MEDICARE

## 2020-06-08 ENCOUNTER — TELEPHONE (OUTPATIENT)
Dept: UROLOGY | Facility: AMBULATORY SURGERY CENTER | Age: 70
End: 2020-06-08

## 2020-06-08 ENCOUNTER — OFFICE VISIT (OUTPATIENT)
Dept: UROLOGY | Facility: AMBULATORY SURGERY CENTER | Age: 70
End: 2020-06-08
Payer: MEDICARE

## 2020-06-08 VITALS
TEMPERATURE: 97.7 F | SYSTOLIC BLOOD PRESSURE: 118 MMHG | HEART RATE: 69 BPM | BODY MASS INDEX: 25.98 KG/M2 | WEIGHT: 196 LBS | DIASTOLIC BLOOD PRESSURE: 78 MMHG | HEIGHT: 73 IN

## 2020-06-08 DIAGNOSIS — N52.31 ERECTILE DYSFUNCTION AFTER RADICAL PROSTATECTOMY: ICD-10-CM

## 2020-06-08 DIAGNOSIS — C61 PROSTATE CANCER (HCC): Primary | ICD-10-CM

## 2020-06-08 PROCEDURE — 99213 OFFICE O/P EST LOW 20 MIN: CPT | Performed by: NURSE PRACTITIONER

## 2020-06-08 RX ORDER — MEGESTROL ACETATE 20 MG/1
20 TABLET ORAL 2 TIMES DAILY
Qty: 60 TABLET | Refills: 3 | Status: SHIPPED | OUTPATIENT
Start: 2020-06-08 | End: 2021-03-25

## 2020-06-09 ENCOUNTER — TELEPHONE (OUTPATIENT)
Dept: UROLOGY | Facility: AMBULATORY SURGERY CENTER | Age: 70
End: 2020-06-09

## 2020-06-10 ENCOUNTER — TELEPHONE (OUTPATIENT)
Dept: PODIATRY | Facility: CLINIC | Age: 70
End: 2020-06-10

## 2020-06-10 ENCOUNTER — OFFICE VISIT (OUTPATIENT)
Dept: PODIATRY | Facility: CLINIC | Age: 70
End: 2020-06-10
Payer: MEDICARE

## 2020-06-10 VITALS
SYSTOLIC BLOOD PRESSURE: 131 MMHG | BODY MASS INDEX: 25.95 KG/M2 | DIASTOLIC BLOOD PRESSURE: 67 MMHG | HEART RATE: 79 BPM | WEIGHT: 195.8 LBS | HEIGHT: 73 IN

## 2020-06-10 DIAGNOSIS — S90.212A CONTUSION OF LEFT GREAT TOE WITH DAMAGE TO NAIL, INITIAL ENCOUNTER: Primary | ICD-10-CM

## 2020-06-10 PROBLEM — L60.0 INGROWING NAIL: Status: RESOLVED | Noted: 2019-07-16 | Resolved: 2020-06-10

## 2020-06-10 PROCEDURE — 11730 AVULSION NAIL PLATE SIMPLE 1: CPT | Performed by: PODIATRIST

## 2020-06-10 PROCEDURE — 99213 OFFICE O/P EST LOW 20 MIN: CPT | Performed by: PODIATRIST

## 2020-06-12 NOTE — TELEPHONE ENCOUNTER
Haris approved with insurance for 12 months through 6/9/2021, I will inform the patient  Thanks  Yolanda Hall

## 2020-06-24 ENCOUNTER — OFFICE VISIT (OUTPATIENT)
Dept: PODIATRY | Facility: CLINIC | Age: 70
End: 2020-06-24
Payer: MEDICARE

## 2020-06-24 VITALS
SYSTOLIC BLOOD PRESSURE: 134 MMHG | HEART RATE: 59 BPM | BODY MASS INDEX: 26.68 KG/M2 | DIASTOLIC BLOOD PRESSURE: 80 MMHG | HEIGHT: 72 IN | WEIGHT: 197 LBS

## 2020-06-24 DIAGNOSIS — S90.212A CONTUSION OF LEFT GREAT TOE WITH DAMAGE TO NAIL, INITIAL ENCOUNTER: Primary | ICD-10-CM

## 2020-06-24 PROCEDURE — 99213 OFFICE O/P EST LOW 20 MIN: CPT | Performed by: PODIATRIST

## 2020-07-17 ENCOUNTER — TELEPHONE (OUTPATIENT)
Dept: RADIATION ONCOLOGY | Facility: HOSPITAL | Age: 70
End: 2020-07-17

## 2020-07-21 ENCOUNTER — APPOINTMENT (OUTPATIENT)
Dept: RADIATION ONCOLOGY | Facility: HOSPITAL | Age: 70
End: 2020-07-21
Attending: STUDENT IN AN ORGANIZED HEALTH CARE EDUCATION/TRAINING PROGRAM
Payer: MEDICARE

## 2020-07-21 ENCOUNTER — TELEMEDICINE (OUTPATIENT)
Dept: RADIATION ONCOLOGY | Facility: HOSPITAL | Age: 70
End: 2020-07-21
Attending: RADIOLOGY
Payer: MEDICARE

## 2020-07-21 DIAGNOSIS — C61 PROSTATE CANCER (HCC): Primary | ICD-10-CM

## 2020-07-21 PROCEDURE — 99211 OFF/OP EST MAY X REQ PHY/QHP: CPT | Performed by: STUDENT IN AN ORGANIZED HEALTH CARE EDUCATION/TRAINING PROGRAM

## 2020-07-21 NOTE — PROGRESS NOTES
Virtual Regular Visit      Assessment/Plan:  Pathologic T2 NX Min score 4+3=7 adenocarcinoma prostate was treated with robotic assisted radical prostatectomy 2 years ago and started Lupron single treatment February of last year after his PSA increased to 0 27  Subsequently he was treated with IMRT completed a year ago  His last PSA July 6 was less than 0 01  He still getting hot flashes and has been prescribed Megace but hesitant to take them  He prefers the symptoms to hopefully resolve naturally  He denies any bowel or urinary complaints  He is on every 3 months soon every 6 months PSA  We asked to see him sometime next spring  Problem List Items Addressed This Visit     None               Reason for visit is   Chief Complaint   Patient presents with    Follow-up     Radiation Oncology Virtual Follow-up        Encounter provider Jadon Rodriguez MD    Provider located at [place] [phone]      Recent Visits  Date Type Provider Dept   07/17/20 Telephone Cheryl Jiang [facility]   Showing recent visits within past 7 days and meeting all other requirements     Today's Visits  Date Type Provider Dept   07/21/20 Nubia Billy MD [facility]   Showing today's visits and meeting all other requirements     Future Appointments  Date Type Provider Dept   07/21/20 Nubia Billy MD [facility]   Showing future appointments within next 150 days and meeting all other requirements        The patient was identified by name and date of birth  Obey Macario was informed that this is a telemedicine visit and that the visit is being conducted through [employer] and patient was informed that this is not a secure, HIPAA-complaint platform  He agrees to proceed     My office door was closed  No one else was in the room    He acknowledged consent and understanding of privacy and security of the video platform  The patient has agreed to participate and understands they can discontinue the visit at any time  Patient is aware this is a billable service  Subjective  Andrea Grant is a 79 y o  male who had PSA relapse last year after radical prostatectomy 2 years ago and received 1 Lupron shot followed by IMRT to the pelvis and prostate bed which he completed June of last year  He has done well  Last PSA was less than 0 01  Rhonda Osage       HPI     Past Medical History:   Diagnosis Date    Basal cell carcinoma     Basal cell carcinoma     Hyperlipidemia     Hypertension     Hypertension     last assesed 12-22-11    PONV (postoperative nausea and vomiting)     Prostate [facility] ([facility] [address]     Seizures (HCC)     simple complex partial seizure--last seizure in 2004    Urinary incontinence        Past Surgical History:   Procedure Laterality Date    INGUINAL HERNIA REPAIR      [place] LAP,PROSTATECTOMY,RADICAL,W/NERVE SPARE,INCL ROBOTIC N/A 5/23/2018    Procedure: Alejandro Corey W ROBOTICS;  Surgeon: Jean Perdomo MD;  [facility];  Service: Urology    PROSTATE BIOPSY  02/01/2018    Dr Oskar Shah  05/2018       Current Outpatient Medications   Medication Sig Dispense Refill    aspirin (ASPIRIN LOW DOSE) 81 MG tablet Take 1 tablet by mouth daily      calcium citrate-vitamin D (CITRACAL+D) 315-200 MG-UNIT per tablet Take by mouth 2 (two) times a day        clotrimazole-betamethasone (LOTRISONE) 1-0 05 % cream Apply 1 application topically 2 (two) times a day       fexofenadine (ALLEGRA) 180 MG tablet Take 1 tablet by mouth daily      levETIRAcetam (KEPPRA) 1000 MG tablet Take 1 tablet by mouth 2 (two) times a day      lisinopril (ZESTRIL) 5 mg tablet Take 1 tablet by mouth daily      megestrol (MEGACE) 20 mg tablet Take 1 tablet (20 mg total) by mouth 2 (two) times a day 60 tablet 3    Melatonin 5 MG TABS Take 5 mg by mouth daily at bedtime        Multiple Vitamin (MULTIVITAMIN) tablet Take 1 tablet by mouth daily      simvastatin (ZOCOR) 40 mg tablet Take 1 tablet by mouth      tadalafil (CIALIS) 20 MG tablet Take 1 tablet (20 mg total) by mouth daily as needed for erectile dysfunction for up to 3 days 3 tablet 6     No current facility-administered medications for this visit  Allergies   Allergen Reactions    Molds & Smuts      Other reaction(s): Respiratory Distress  Congestion, sore throat, coughing  seasonal    Other      Other reaction(s): Respiratory Distress  Congestion, sore throat, coughing  Review of Systems   All other systems reviewed and are negative  Video Exam    There were no vitals filed for this visit  Physical Exam not applicable    I spent 20 minutes directly with the patient during this visit      [id number] acknowledges that he has consented to an online visit or consultation  He understands that the online visit is based solely on information provided by him, and that, in the absence of a face-to-face physical evaluation by the physician, the diagnosis he receives is both limited and provisional in terms of accuracy and completeness  This is not intended to replace a full medical face-to-face evaluation by the physician  Valery Rodríguez understands and accepts these terms

## 2020-07-21 NOTE — PROGRESS NOTES
Virtual Regular Visit    Problem List Items Addressed This Visit     None        Encounter provider Stuart Christopher MD    Provider located at [address] [place] [address]  [address] [phone]    Recent Visits  Date Type Provider Dept   07/17/20 Telephone Claudia Garner [facility]   Showing recent visits within past 7 days and meeting all other requirements     Today's Visits  Date Type Provider Dept   07/21/20 MD Surendra [facility]   Showing today's visits and meeting all other requirements     Future Appointments  Date Type Provider Dept   07/21/20 Yasmin Max MD [facility]   Showing future appointments within next 150 days and meeting all other requirements        After connecting through [email or web address], the patient was identified by name and date of birth  Verdon Sacks was informed that this is a telemedicine visit and that the visit is being conducted through [employer] which may not be secure and therefore, might not be HIPAA-compliant  My office door was closed  No one else was in the room  He acknowledged consent and understanding of privacy and security of the video platform  The patient has agreed to participate and understands they can discontinue the visit at any time  Prince Bragg is a 79year old male with pretreatment PSA of 12 2, pT2Nx, GS 4+3=7, adenocarcinoma of the prostate, status post radical prostatectomy 5/23/18  He initiated ADT on 2/11/19, and received  Lupron  His pelvic/prostate bed radiation therapy was completed 6/7/19   He was last seen in follow up by Dr Keiko Brennan on 1/23/20    3/3/20 PSA <0 01    6/8/20 Nikky Betancourt, [facility]   Denies urinary symptoms  Megace ordered for hot flashes    7/6/20 PSA <0 01    9/15/20 [facility]              Past Medical History:   Diagnosis Date    Basal cell carcinoma     Basal cell carcinoma     Hyperlipidemia     Hypertension     Hypertension     last assesed 12-22-11    PONV (postoperative nausea and vomiting)     [facility] [address]     Seizures (HCC)     simple complex partial seizure--last seizure in 2004    Urinary incontinence        Past Surgical History:   Procedure Laterality Date    INGUINAL HERNIA REPAIR      [place] LAP,PROSTATECTOMY,RADICAL,W/NERVE SPARE,INCL ROBOTIC N/A 5/23/2018    Procedure: Garth Castellon;  Surgeon: Lena Zhou MD;  [facility];  Service: Urology    PROSTATE BIOPSY  02/01/2018    Dr Alfreda Rajan  05/2018       Current Outpatient Medications   Medication Sig Dispense Refill    aspirin (ASPIRIN LOW DOSE) 81 MG tablet Take 1 tablet by mouth daily      calcium citrate-vitamin D (CITRACAL+D) 315-200 MG-UNIT per tablet Take by mouth 2 (two) times a day        clotrimazole-betamethasone (LOTRISONE) 1-0 05 % cream Apply 1 application topically 2 (two) times a day       fexofenadine (ALLEGRA) 180 MG tablet Take 1 tablet by mouth daily      levETIRAcetam (KEPPRA) 1000 MG tablet Take 1 tablet by mouth 2 (two) times a day      lisinopril (ZESTRIL) 5 mg tablet Take 1 tablet by mouth daily      megestrol (MEGACE) 20 mg tablet Take 1 tablet (20 mg total) by mouth 2 (two) times a day 60 tablet 3    Melatonin 5 MG TABS Take 5 mg by mouth daily at bedtime        Multiple Vitamin (MULTIVITAMIN) tablet Take 1 tablet by mouth daily      simvastatin (ZOCOR) 40 mg tablet Take 1 tablet by mouth      tadalafil (CIALIS) 20 MG tablet Take 1 tablet (20 mg total) by mouth daily as needed for erectile dysfunction for up to 3 days 3 tablet 6     No current facility-administered medications for this visit  Allergies   Allergen Reactions    Molds & Smuts      Other reaction(s): Respiratory Distress  Congestion, sore throat, coughing   seasonal    Other      Other reaction(s): Respiratory Distress  Congestion, sore throat, coughing  I spent 15 minutes with the patient during this visit  Follow up visit          Prostate cancer ([facility] [address] )    2/2018 Initial Diagnosis     [facility]      2/1/2018 Biopsy     Prostate Biopsy (Dr Jasmyne Faustin)    A  Prostate, JAUN DANIEL, core needle biopsies:             - Benign prostate glands  - No malignancy is identified, supported on a prostate triple stain, performed on slide A2 with an appropriate control      B  Prostate, LCZ, core needle biopsy:             - Rare atypical prostate glands (less than 5% of the core biopsy), suspicious for prostatic adenocarcinoma, Min score 3 + 3 = 6, Prognostic Grade I              - Focal loss of staining for basal cell markers (p63 and ) is identified, and positive luminal staining for p504s is noted, (prostate triple stain, performed with an appropriate control)      C  Prostate, RCZ, core needle biopsy:             - Prostatic adenocarcinoma, Min score 3 + 4 = 7, Prognostic Grade Grade II, involving approximately 30% of 1 of 2 core biopsies              - Less than 10% Min grade 4 prostatic adenocarcinoma             - High-grade PIN              - Loss of staining for basal cell markers (p63 and ) is identified, and positive luminal staining for p504s is noted, (prostate triple stain, performed with an appropriate control)      D  Prostate, RPZ, core needle biopsies:             - Prostatic adenocarcinoma, [place] score 3 + 4 = 7, Prognostic Grade Group II, involving 2 of 4 core biopsies (80%, 75%)  - Approximately 30% [place] grade 4 prostatic adenocarcinoma is identified on each core               - Prostatic adenocarcinoma, Min score 3 + 3 = 6, Prognostic Grade Group I, involving less than 5% of 1 of 4 core biopsies              - Loss of staining for basal cell markers (p63 and ) is identified, and positive luminal staining for p504s is noted, (prostate triple stain, performed with an appropriate control)            5/23/2018 Surgery     Prostate, radical prostatectomy: (Dr Rosa Dumont)  -  Prostatic adenocarcinoma, acinar type, [place] grade 4+3=7 (see synoptic report)      1  Specimen identification:       - Procedure:  Radical prostatectomy     - Prostate size and weight:  4 9 x 4 2 x 4 1 cm, 56g     - Lymph node sampling:  Not performed   2  Tumor     - Histologic type:  Acinar adenocarcinoma     - Histologic Grade: [place] Pattern:       * primary pattern:  4     * secondary pattern:  3     * tertiary pattern:  N/A     * total Min Score: 7      * Grade Group:  3     * Percentage of pattern 4:60-70%     * Percentage of pattern 5: N/A     * Intraductal carcinoma: Not identified    - Tumor quantitation:  5-10%    - Extraprostatic extension (pT2): Not identified    - Urinary bladder neck invasion:  Not identified    - Seminal vesicle invasion:  Not identified   5  Margins: All surgical resection margins negative for carcinoma   6  Margin positivity in area of extraprostatic extension:  N/A   7  Treatment effect on carcinoma:  No known prior therapy   8  Lymph-vascular invasion:  Not identified (confirmed by immunohistochemical stains performed with appropriate controls on block A31 for CD31 and D2-40)   9  Perineural invasion:  Present   10  Regional lymph nodes (pNX):  No lymph nodes submitted  11  Additional pathologic findings:  atrophy  12  Ancillary studies:  N/A  13  PSA: No recent studies available  14   Best representative block if additional studies are needed:  A30  15  8th Ed AJCC Tumor Stage:  at least Stage  I -pT2, pNx, [place]'s score  4+3=7      2/11/2019 -  Hormone Therapy     Lupron Depot IM injection       4/16/2019 - 6/7/2019 Radiation     Treatment:  Course: C1    Plan ID Energy Fractions Dose per Fraction (cGy) Dose Correction (cGy) Total Dose Delivered (cGy) Elapsed Days   CD Pelvis 6X 12 / 12 180 0 2,160 16   Whole Pelvis 6X 25 / 25 180 0 4,500 35              Clinical Trial: no      Health Maintenance   Topic Date Due    Hepatitis C Screening  1950    Medicare Annual Wellness Visit (AWV)  1950    CRC Screening: Colonoscopy  1950    BMI: Followup Plan  05/07/1968    PT PLAN OF CARE  03/04/2020    Influenza Vaccine  07/01/2020    Fall Risk  02/03/2021    Depression Screening PHQ  02/03/2021    BMI: Adult  06/24/2021    DTaP,Tdap,and Td Vaccines (2 - Td) 11/29/2022    Pneumococcal Vaccine: 65+ Years  Completed    Pneumococcal Vaccine: Pediatrics (0 to 5 Years) and At-Risk Patients (6 to 59 Years)  Aged Out    HIB Vaccine  Aged Out    Hepatitis B Vaccine  Aged Out    IPV Vaccine  Aged Out    Hepatitis A Vaccine  Aged Out    Meningococcal ACWY Vaccine  Aged Out    HPV Vaccine  Aged Out       Patient Active Problem List   Diagnosis    Pro[facility] cancer ([facility] [address] )    Tinea unguium    Contusion of left great toe with damage to nail     Past Medical History:   Diagnosis Date    Basal cell carcinoma     Basal cell carcinoma     Hyperlipidemia     Hypertension     Hypertension     last assesed 12-22-11    PONV (postoperative nausea and vomiting)     Prostate cancer ([facility] [address] )     Seizures ([facility] [address] )     simple complex partial seizure--last seizure in 2004    Urinary incontinence      Past Surgical History:   Procedure Laterality Date    INGUINAL HERNIA REPAIR      [place] LAP,PROSTATECTOMY,RADICAL,W/NERVE SPARE,INCL ROBOTIC N/A 5/23/2018    Procedure: PROSTATECTOMY RADICAL W ROBOTICS;  Surgeon: Silverio Lyle MD;  [facility];  Service: Urology    PROSTATE BIOPSY  02/01/2018    Dr Stacey Stinson  05/2018     Family History   Problem Relation Age of Onset    No Known Problems Mother     Cancer Father     Cancer Brother      Social History     Socioeconomic History    Marital status: /Civil Union     Spouse name: Not on file    Number of children: Not on file    Years of education: Not on file    Highest education level: Not on file   Occupational History    Not on file   Social Needs    Financial resource strain: Not on file    Food insecurity:     Worry: Not on file     Inability: Not on file    Transportation needs:     Medical: Not on file     Non-medical: Not on file   Tobacco Use    Smoking status: Never Smoker    Smokeless tobacco: Never Used   Substance and Sexual Activity    Alcohol use: No    Drug use: No    Sexual activity: Not on file   Lifestyle    Physical activity:     Days per week: Not on file     Minutes per session: Not on file    Stress: Not on file   Relationships    Social connections:     Talks on phone: Not on file     Gets together: Not on file     Attends Hinduism service: Not on file     Active member of club or organization: Not on file     Attends meetings of clubs or organizations: Not on file     Relationship status: Not on file    Intimate partner violence:     Fear of current or ex partner: Not on file     Emotionally abused: Not on file     Physically abused: Not on file     Forced sexual activity: Not on file   Other Topics Concern    Not on file   Social History Narrative    Not on file       Current Outpatient Medications:     aspirin (ASPIRIN LOW DOSE) 81 MG tablet, Take 1 tablet by mouth daily, Disp: , Rfl:     calcium citrate-vitamin D (CITRACAL+D) 315-200 MG-UNIT per tablet, Take by mouth 2 (two) times a day  , Disp: , Rfl:     clotrimazole-betamethasone (LOTRISONE) 1-0 05 % cream, Apply 1 application topically 2 (two) times a day , Disp: , Rfl:     fexofenadine (ALLEGRA) 180 MG tablet, Take 1 tablet by mouth daily, Disp: , Rfl:     levETIRAcetam (KEPPRA) 1000 MG tablet, Take 1 tablet by mouth 2 (two) times a day, Disp: , Rfl:     lisinopril (ZESTRIL) 5 mg tablet, Take 1 tablet by mouth daily, Disp: , Rfl:     megestrol (MEGACE) 20 mg tablet, Take 1 tablet (20 mg total) by mouth 2 (two) times a day, Disp: 60 tablet, Rfl: 3    Melatonin 5 MG TABS, Take 5 mg by mouth daily at bedtime  , Disp: , Rfl:     Multiple Vitamin (MULTIVITAMIN) tablet, Take 1 tablet by mouth daily, Disp: , Rfl:     simvastatin (ZOCOR) 40 mg tablet, Take 1 tablet by mouth, Disp: , Rfl:     tadalafil (CIALIS) 20 MG tablet, Take 1 tablet (20 mg total) by mouth daily as needed for erectile dysfunction for up to 3 days, Disp: 3 tablet, Rfl: 6  Allergies   Allergen Reactions    Molds & Smuts      Other reaction(s): Respiratory Distress  Congestion, sore throat, coughing  seasonal    Other      Other reaction(s): Respiratory Distress  Congestion, sore throat, coughing  Review of Systems:  Review of Systems   Constitutional: Negative  HENT: Negative  Respiratory: Negative  Cardiovascular: Negative  Gastrointestinal: Negative  Endocrine: Positive for heat intolerance (hot flashes every night and occasionally during the day since Lupron in Feb 2019)  Genitourinary: Negative  Denies any nocturia or urinary symptoms/problems   Musculoskeletal: Negative  Skin: Negative  Allergic/Immunologic: Negative  Neurological: Negative  Hematological: Negative  Psychiatric/Behavioral: Negative  There were no vitals filed for this visit  Imaging:No results found

## 2020-07-23 ENCOUNTER — TELEPHONE (OUTPATIENT)
Dept: RADIATION ONCOLOGY | Facility: HOSPITAL | Age: 70
End: 2020-07-23

## 2020-09-15 ENCOUNTER — OFFICE VISIT (OUTPATIENT)
Dept: UROLOGY | Facility: AMBULATORY SURGERY CENTER | Age: 70
End: 2020-09-15
Payer: MEDICARE

## 2020-09-15 VITALS
TEMPERATURE: 96.8 F | WEIGHT: 197 LBS | HEART RATE: 69 BPM | BODY MASS INDEX: 26.68 KG/M2 | DIASTOLIC BLOOD PRESSURE: 70 MMHG | SYSTOLIC BLOOD PRESSURE: 130 MMHG | HEIGHT: 72 IN

## 2020-09-15 DIAGNOSIS — C61 PROSTATE CANCER (HCC): Primary | ICD-10-CM

## 2020-09-15 PROCEDURE — 99213 OFFICE O/P EST LOW 20 MIN: CPT | Performed by: NURSE PRACTITIONER

## 2020-09-15 NOTE — PROGRESS NOTES
9/15/2020    Uzair Hua  1950  [id number]      Assessment  -[place] 7 T2c prostate cancer s/p DVP (5/23/19), XRT (6/2019)  -Erectile dysfunction    Discussion/Plan  Angelica Marquez is a 79 y o  male being managed by our office    1  [place] 7 T2c prostate cancer s/p DVP (5/23/19), XRT (6/2019)- we reviewed the results of his recent PSA which remains <0 1  He will continue to follow under radiation oncology  Discussed management for his occasional hot flashes, but he states it is manageable and slowly improving  2  Erectile dysfunction- patient interested in additional treatment options at this time    Followup in 6 months with PSA  He was instructed to call with any issues    -All questions answered, patient agrees with plan      History of Present Illness  79 y o  male with a history of [place] 7 prostate cancer and ED presents today for follow up  Patient previously known to Dr Kosta Dorantes  He underwent radical prostatectomy in May 2018  Shortly after surgery, he developed PSA recurrence and completed adjuvant radiation therapy in June 2019  He also received 1 dose of Lupron 11/20/2019  Patient continues to follow under radiation oncology  His PSA has since been undetectable  He denies any lower urinary tract symptoms, gross hematuria, or dysuria  He does continue to no occasional episodes of nightly hot flashes, but states it is improving  He had previously been prescribed Cialis for management of erectile dysfunction, but has yet to try medication  No additional changes to his overall health  Review of Systems  Review of Systems   Constitutional: Negative  HENT: Negative  Respiratory: Negative  Cardiovascular: Negative  Gastrointestinal: Negative  Genitourinary: Negative for decreased urine volume, difficulty urinating, dysuria, flank pain, frequency, hematuria and urgency  Musculoskeletal: Negative  Skin: Negative  Neurological: Negative  Psychiatric/Behavioral: Negative        AUA SYMPTOM SCORE      Most Recent Value   AUA SYMPTOM SCORE   How often have you had a sensation of not emptying your bladder completely after you finished urinating? 0   How often have you had to urinate again less than two hours after you finished urinating? 0   How often have you found you stopped and started again several times when you urinate?  0   How often have you found it difficult to postpone urination? 0   How often have you had a weak urinary stream?  0   How often have you had to push or strain to begin urination? 0   How many times did you most typically get up to urinate from the time you went to bed at night until the time you got up in the morning?   1   Quality of Life: If you were to spend the rest of your life with your urinary condition just the way it is now, how would you feel about that?  1   AUA SYMPTOM SCORE  1          Past Medical History  Past Medical History:   Diagnosis Date    Basal cell carcinoma     Basal cell carcinoma     Hyperlipidemia     Hypertension     Hypertension     last assesed 12-22-11    PONV (postoperative nausea and vomiting)     [facility] [address]     Seizures ([facility] [address]     simple complex partial seizure--last seizure in 2004    Urinary incontinence        Past Social History  Past Surgical History:   Procedure Laterality Date    INGUINAL HERNIA REPAIR      DE LAP,PROSTATECTOMY,RADICAL,W/NERVE SPARE,INCL ROBOTIC N/A 5/23/2018    Procedure: PROSTATECTOMY RADICAL W ROBOTICS;  Surgeon: Fred Nolasco MD;  [facility];  Service: Urology    PROSTATE BIOPSY  02/01/2018    Dr Lucinda Kussmaul  05/2018       Past Family History  Family History   Problem Relation Age of Onset    No Known Problems Mother     Cancer Father     Cancer Brother        Past Social history  Social History     Socioeconomic History    Marital status: /Civil Union     Spouse name: Not on file    Number of children: Not on file    Years of education: Not on file    Highest education level: Not on file   Occupational History    Not on file   Social Needs    Financial resource strain: Not on file    Food insecurity     Worry: Not on file     Inability: Not on file    Transportation needs     Medical: Not on file     Non-medical: Not on file   Tobacco Use    Smoking status: Never Smoker    Smokeless tobacco: Never Used   Substance and Sexual Activity    Alcohol use: No    Drug use: No    Sexual activity: Not on file   Lifestyle    Physical activity     Days per week: Not on file     Minutes per session: Not on file    Stress: Not on file   Relationships    Social connections     Talks on phone: Not on file     Gets together: Not on file     Attends Alevism service: Not on file     Active member of club or organization: Not on file     Attends meetings of clubs or organizations: Not on file     Relationship status: Not on file    Intimate partner violence     Fear of current or ex partner: Not on file     Emotionally abused: Not on file     Physically abused: Not on file     Forced sexual activity: Not on file   Other Topics Concern    Not on file   Social History Narrative    Not on file       Current Medications  Current Outpatient Medications   Medication Sig Dispense Refill    aspirin (ASPIRIN LOW DOSE) 81 MG tablet Take 1 tablet by mouth daily      calcium citrate-vitamin D (CITRACAL+D) 315-200 MG-UNIT per tablet Take by mouth 2 (two) times a day        clotrimazole-betamethasone (LOTRISONE) 1-0 05 % cream Apply 1 application topically 2 (two) times a day       fexofenadine (ALLEGRA) 180 MG tablet Take 1 tablet by mouth daily      levETIRAcetam (KEPPRA) 1000 MG tablet Take 1 tablet by mouth 2 (two) times a day      lisinopril (ZESTRIL) 5 mg tablet Take 1 tablet by mouth daily      megestrol (MEGACE) 20 mg tablet Take 1 tablet (20 mg total) by mouth 2 (two) times a day 60 tablet 3    Melatonin 5 MG TABS Take 5 mg by mouth daily at bedtime        Multiple Vitamin (MULTIVITAMIN) tablet Take 1 tablet by mouth daily      simvastatin (ZOCOR) 40 mg tablet Take 1 tablet by mouth      tadalafil (CIALIS) 20 MG tablet Take 1 tablet (20 mg total) by mouth daily as needed for erectile dysfunction for up to 3 days 3 tablet 6     No current facility-administered medications for this visit  Allergies  Allergies   Allergen Reactions    Molds & Smuts      Other reaction(s): Respiratory Distress  Congestion, sore throat, coughing  seasonal    Other      Other reaction(s): Respiratory Distress  Congestion, sore throat, coughing  Past Medical History, Social History, Family History, medications and allergies were reviewed  Vitals  Vitals:    09/15/20 0737   BP: 130/70   Pulse: 69   Temp: (!) 96 8 °F (36 °C)   TempSrc: Temporal   Weight: 89 4 kg (197 lb)   Height: 6' (1 829 m)       Physical Exam  Physical Exam  Constitutional:       Appearance: Normal appearance  He is well-developed  HENT:      Head: Normocephalic  Eyes:      Pupils: Pupils are equal, round, and reactive to light  Neck:      Musculoskeletal: Normal range of motion  Pulmonary:      Effort: Pulmonary effort is normal    Abdominal:      Palpations: Abdomen is soft  Musculoskeletal: Normal range of motion  Neurological:      General: No focal deficit present  Mental Status: He is alert and oriented to person, place, and time  Psychiatric:         Mood and Affect: Mood normal          Behavior: Behavior normal          Thought Content:  Thought content normal          Judgment: Judgment normal          Results    I have personally reviewed all pertinent lab results and reviewed with patient  No results found for: PSA  Lab Results   Component Value Date    GLUCOSE 96 02/19/2015    CALCIUM 8 5 05/24/2018     02/19/2015    K 4 7 05/24/2018    CO2 27 05/24/2018     (H) 05/24/2018    BUN 10 05/24/2018    CREATININE 0 97 05/24/2018     Lab Results   Component Value Date    WBC 7 18 05/24/2018    HGB 12 7 05/24/2018    HCT 37 2 05/24/2018    MCV 93 05/24/2018     (L) 05/24/2018     No results found for this or any previous visit (from the past 1 hour(s))

## 2021-01-10 ENCOUNTER — IMMUNIZATIONS (OUTPATIENT)
Dept: FAMILY MEDICINE CLINIC | Facility: HOSPITAL | Age: 71
End: 2021-01-10

## 2021-01-10 DIAGNOSIS — Z23 ENCOUNTER FOR IMMUNIZATION: ICD-10-CM

## 2021-01-10 PROCEDURE — 91300 SARS-COV-2 / COVID-19 MRNA VACCINE (PFIZER-BIONTECH) 30 MCG: CPT

## 2021-01-10 PROCEDURE — 0001A SARS-COV-2 / COVID-19 MRNA VACCINE (PFIZER-BIONTECH) 30 MCG: CPT

## 2021-01-31 ENCOUNTER — IMMUNIZATIONS (OUTPATIENT)
Dept: FAMILY MEDICINE CLINIC | Facility: HOSPITAL | Age: 71
End: 2021-01-31

## 2021-01-31 DIAGNOSIS — Z23 ENCOUNTER FOR IMMUNIZATION: Primary | ICD-10-CM

## 2021-01-31 PROCEDURE — 0002A SARS-COV-2 / COVID-19 MRNA VACCINE (PFIZER-BIONTECH) 30 MCG: CPT

## 2021-01-31 PROCEDURE — 91300 SARS-COV-2 / COVID-19 MRNA VACCINE (PFIZER-BIONTECH) 30 MCG: CPT

## 2021-03-29 ENCOUNTER — OFFICE VISIT (OUTPATIENT)
Dept: UROLOGY | Facility: AMBULATORY SURGERY CENTER | Age: 71
End: 2021-03-29
Payer: MEDICARE

## 2021-03-29 VITALS
BODY MASS INDEX: 27.01 KG/M2 | HEART RATE: 74 BPM | SYSTOLIC BLOOD PRESSURE: 120 MMHG | WEIGHT: 199.4 LBS | DIASTOLIC BLOOD PRESSURE: 84 MMHG | HEIGHT: 72 IN

## 2021-03-29 DIAGNOSIS — N52.31 ERECTILE DYSFUNCTION AFTER RADICAL PROSTATECTOMY: ICD-10-CM

## 2021-03-29 DIAGNOSIS — C61 PROSTATE CANCER (HCC): Primary | ICD-10-CM

## 2021-03-29 PROCEDURE — 99213 OFFICE O/P EST LOW 20 MIN: CPT | Performed by: NURSE PRACTITIONER

## 2021-03-29 RX ORDER — SILDENAFIL 50 MG/1
100 TABLET, FILM COATED ORAL DAILY PRN
Qty: 5 TABLET | Refills: 0 | Status: SHIPPED | OUTPATIENT
Start: 2021-03-29 | End: 2021-11-11

## 2021-03-29 NOTE — PROGRESS NOTES
03/28/21    Que Hsieh   1950   [id number]     Assessment  1 Min 7 T2c prostate cancer s/p DVP (5/23/19), XRT (6/2019)  2 Erectile dysfunction    Discussion/Plan  1  Min 7 T2c prostate cancer s/p DVP (5/23/19), XRT (6/2019)   -PSA 3/9/21 <0 1, undetectable   -He will continue to follow under radiation oncology   -Discussed management for his occasional hot flashes  He reports they occur at night time around [id number]  He wishes to stay away from medication  Megace has been recommended in the past which he refuses at present time     -Urinary symptoms are minimal   2  Erectile dysfunction   -Cialis was prescribed but he has not taken this medication due to poor efficacy and cost  He wishes to try something else for ED  Viagra sent to his pharmacy  We reviewed mechanism of action and side effect profile  Patient will up in 6 months with PSA  He would like to establish with an MD since Dr Johnny Bradford is no longer with our practice  We will schedule with an MD in [place] or [address] [place] [address] per his request  He was instructed to call with any issues  All questions answered  He is in agreement of the plan  Subjective  HPI   Silvia Gavin is a 79 y o  male with a history of [place] 7 prostate cancer and ED presents today for follow up  Patient previously known to Dr Johnny Bradford  He underwent radical prostatectomy in May 2018  Shortly after surgery, he developed PSA recurrence and completed adjuvant radiation therapy in June 2019  He also received 1 dose of Lupron 11/20/2019  Patient continues to follow under radiation oncology  His PSA has since been undetectable  He denies any lower urinary tract symptoms, gross hematuria, or dysuria  He does continue to have occasional episodes of nightly hot flashes, but states it is improving slowly  He denies incontinence, gross hematuria, dysuria, pain, burning, bone pain   He had previously been prescribed Cialis for management of erectile dysfunction, but discontinued use due to cost and efficacy  No additional changes to his overall health  Review of Systems - History obtained from chart review and the patient  General ROS: negative for - chills, fatigue, fever, malaise or weight loss  Psychological ROS: negative  Ophthalmic ROS: negative  ENT ROS: negative  Allergy and Immunology ROS: negative  Hematological and Lymphatic ROS: negative  Endocrine ROS: positive for - hot flashes  Respiratory ROS: no cough, shortness of breath, or wheezing  Cardiovascular ROS: no chest pain or dyspnea on exertion  Gastrointestinal ROS: no abdominal pain, change in bowel habits, or black or bloody stools  Genito-Urinary ROS: positive for - erectile dysfunction  Musculoskeletal ROS: negative  Neurological ROS: negative      Objective  Physical Exam  Vitals signs reviewed  Constitutional:       Appearance: Normal appearance  He is well-developed  HENT:      Head: Normocephalic and atraumatic  Neck:      Musculoskeletal: Normal range of motion and neck supple  Pulmonary:      Effort: Pulmonary effort is normal  No respiratory distress  Musculoskeletal: Normal range of motion  Skin:     General: Skin is warm and dry  Neurological:      General: No focal deficit present  Mental Status: He is alert and oriented to person, place, and time  Mental status is at baseline  Psychiatric:         Attention and Perception: Attention normal          Mood and Affect: Mood is anxious  Behavior: Behavior normal          Thought Content:  Thought content normal          Judgment: Judgment normal          [place]

## 2021-04-20 ENCOUNTER — RADIATION ONCOLOGY FOLLOW-UP (OUTPATIENT)
Dept: RADIATION ONCOLOGY | Facility: HOSPITAL | Age: 71
End: 2021-04-20
Attending: RADIOLOGY
Payer: MEDICARE

## 2021-04-20 VITALS
RESPIRATION RATE: 17 BRPM | TEMPERATURE: 97 F | HEART RATE: 69 BPM | BODY MASS INDEX: 26.61 KG/M2 | HEIGHT: 73 IN | SYSTOLIC BLOOD PRESSURE: 147 MMHG | WEIGHT: 200.8 LBS | DIASTOLIC BLOOD PRESSURE: 81 MMHG

## 2021-04-20 DIAGNOSIS — C61 PROSTATE CANCER (HCC): Primary | ICD-10-CM

## 2021-04-20 PROCEDURE — 99211 OFF/OP EST MAY X REQ PHY/QHP: CPT | Performed by: RADIOLOGY

## 2021-04-20 NOTE — PROGRESS NOTES
Lani Sahni  1950  [id number]    Radiation Oncology Follow Up  BE ROSETTA Toro is a 79y o  year old male with pretreatment PSA of 12 2, pT2Nx, GS 4+3=7, adenocarcinoma of the prostate, status post radical prostatectomy 5/23/18  He initiated ADT on 2/11/19, and received  Lupron  His pelvic/prostate bed radiation therapy was completed 6/7/19  A virtual follow up was done on 7/21/20    PSA 7/6/20 <0 01           3/9/21 <0 01    9/15/20 [facility]  Hot flashes are improving    3/29/21 [facility]  Having hot flashes but does not want Megace    10/12/21 Lance Hood MD      Oncology History   Prostate cancer [facility])   2/2018 Initial Diagnosis    Prostate cancer ([facility] [address] )     2/1/2018 Biopsy    Prostate Biopsy (Dr Rene Davis)    A  Prostate, JUAN DANIEL, core needle biopsies:             - Benign prostate glands  - No malignancy is identified, supported on a prostate triple stain, performed on slide A2 with an appropriate control      B  Prostate, LCZ, core needle biopsy:             - Rare atypical prostate glands (less than 5% of the core biopsy), suspicious for prostatic adenocarcinoma, [place] score 3 + 3 = 6, Prognostic Grade I              - Focal loss of staining for basal cell markers (p63 and ) is identified, and positive luminal staining for p504s is noted, (prostate triple stain, performed with an appropriate control)      C  Prostate, RCZ, core needle biopsy:             - Prostatic adenocarcinoma, [place] score 3 + 4 = 7, Prognostic Grade Grade II, involving approximately 30% of 1 of 2 core biopsies              - Less than 10% Min grade 4 prostatic adenocarcinoma             - High-grade PIN              - Loss of staining for basal cell markers (p63 and ) is identified, and positive luminal staining for p504s is noted, (prostate triple stain, performed with an appropriate control)      D   Prostate, RPZ, core needle biopsies: - Prostatic adenocarcinoma, Min score 3 + 4 = 7, Prognostic Grade Group II, involving 2 of 4 core biopsies (80%, 75%)  - Approximately 30% Min grade 4 prostatic adenocarcinoma is identified on each core  - Prostatic adenocarcinoma, Min score 3 + 3 = 6, Prognostic Grade Group I, involving less than 5% of 1 of 4 core biopsies              - Loss of staining for basal cell markers (p63 and ) is identified, and positive luminal staining for p504s is noted, (prostate triple stain, performed with an appropriate control)           5/23/2018 Surgery    Prostate, radical prostatectomy: (Dr Elsie Nelson)  -  Prostatic adenocarcinoma, acinar type, [place] grade 4+3=7 (see synoptic report)      1  Specimen identification:       - Procedure:  Radical prostatectomy     - Prostate size and weight:  4 9 x 4 2 x 4 1 cm, 56g     - Lymph node sampling:  Not performed   2  Tumor     - Histologic type:  Acinar adenocarcinoma     - Histologic Grade: [place] Pattern:       * primary pattern:  4     * secondary pattern:  3     * tertiary pattern:  N/A     * total Min Score: 7      * Grade Group:  3     * Percentage of pattern 4:60-70%     * Percentage of pattern 5: N/A     * Intraductal carcinoma: Not identified    - Tumor quantitation:  5-10%    - Extraprostatic extension (pT2): Not identified    - Urinary bladder neck invasion:  Not identified    - Seminal vesicle invasion:  Not identified   5  Margins: All surgical resection margins negative for carcinoma   6  Margin positivity in area of extraprostatic extension:  N/A   7  Treatment effect on carcinoma:  No known prior therapy   8  Lymph-vascular invasion:  Not identified (confirmed by immunohistochemical stains performed with appropriate controls on block A31 for CD31 and D2-40)   9  Perineural invasion:  Present   10  Regional lymph nodes (pNX):  No lymph nodes submitted  11  Additional pathologic findings:  atrophy  12   Ancillary studies:  N/A  13  PSA: No recent studies available  14  Best representative block if additional studies are needed:  A30  15  8th Ed AJCC Tumor Stage:  at least Stage  I -pT2, pNx, Min's score  4+3=7     2/11/2019 -  Hormone Therapy    Lupron Depot IM injection      4/16/2019 - 6/7/2019 Radiation    Treatment:  Course: C1    Plan ID Energy Fractions Dose per Fraction (cGy) Dose Correction (cGy) Total Dose Delivered (cGy) Elapsed Days   CD Pelvis 6X 12 / 12 180 0 2,160 16   Whole Pelvis 6X 25 / 25 180 0 4,500 35              Patient Active Problem List   Diagnosis    [facility] [address] [facility]    Tinea unguium    Contusion of left great toe with damage to nail    Cancer Staging  No matching staging information was found for the patient    Past Medical History:   Diagnosis Date    Basal cell carcinoma     Basal cell carcinoma     Hyperlipidemia     Hypertension     Hypertension     last assesed 12-22-11    PONV (postoperative nausea and vomiting)     Prostate cancer (HCC)     Seizures (HCC)     simple complex partial seizure--last seizure in 2004    Urinary incontinence      Social History     Socioeconomic History    Marital status: /Civil Union     Spouse name: Not on file    Number of children: Not on file    Years of education: Not on file    Highest education level: Not on file   Occupational History    Not on file   Social Needs    Financial resource strain: Not on file    Food insecurity     Worry: Not on file     Inability: Not on file   [employer] needs     Medical: Not on file     Non-medical: Not on file   Tobacco Use    Smoking status: Never Smoker    Smokeless tobacco: Never Used   Substance and Sexual Activity    Alcohol use: No    Drug use: No    Sexual activity: Not on file   Lifestyle    Physical activity     Days per week: Not on file     Minutes per session: Not on file    Stress: Not on file   Relationships    Social connections     Talks on phone: Not on file Gets together: Not on file     Attends Quaker service: Not on file     Active member of club or organization: Not on file     Attends meetings of clubs or organizations: Not on file     Relationship status: Not on file    Intimate partner violence     Fear of current or ex partner: Not on file     Emotionally abused: Not on file     Physically abused: Not on file     Forced sexual activity: Not on file   Other Topics Concern    Not on file   Social History Narrative    Not on file     Family History   Problem Relation Age of Onset    No Known Problems Mother     Cancer Father     Cancer Brother      Past Surgical History:   Procedure Laterality Date    INGUINAL HERNIA REPAIR      [place] LAP,PROSTATECTOMY,RADICAL,W/NERVE SPARE,INCL ROBOTIC N/A 5/23/2018    Procedure: Donny Lucas W ROBOTICS;  Surgeon: Brendan Oconnor MD;  Location[facility];  Service: Urology    PROSTATE BIOPSY  02/01/2018    Dr Seema Hurst  05/2018       Current Outpatient Medications:     aspirin (ASPIRIN LOW DOSE) 81 MG tablet, Take 1 tablet by mouth daily, Disp: , Rfl:     calcium citrate-vitamin D (CITRACAL+D) 315-200 MG-UNIT per tablet, Take by mouth 2 (two) times a day  , Disp: , Rfl:     clotrimazole-betamethasone (LOTRISONE) 1-0 05 % cream, Apply 1 application topically 2 (two) times a day , Disp: , Rfl:     fexofenadine (ALLEGRA) 180 MG tablet, Take 1 tablet by mouth daily, Disp: , Rfl:     levETIRAcetam (KEPPRA) 1000 MG tablet, Take 1 tablet by mouth 2 (two) times a day, Disp: , Rfl:     lisinopril (ZESTRIL) 5 mg tablet, Take 1 tablet by mouth daily, Disp: , Rfl:     Melatonin 5 MG TABS, Take 5 mg by mouth daily at bedtime  , Disp: , Rfl:     Multiple Vitamin (MULTIVITAMIN) tablet, Take 1 tablet by mouth daily, Disp: , Rfl:     sildenafil (VIAGRA) 50 MG tablet, Take 2 tablets (100 mg total) by mouth daily as needed for erectile dysfunction, Disp: 5 tablet, Rfl: 0   simvastatin (ZOCOR) 40 mg tablet, Take 1 tablet by mouth, Disp: , Rfl:   Allergies   Allergen Reactions    Molds & Smuts      Other reaction(s): Respiratory Distress  Congestion, sore throat, coughing  seasonal    Other      Other reaction(s): Respiratory Distress  Congestion, sore throat, coughing  Review of Systems:  Review of Systems   Constitutional: Negative  HENT: Negative  Eyes: Negative  Respiratory: Negative  Cardiovascular: Negative  Gastrointestinal: Negative  Endocrine: Positive for heat intolerance (due to Lupron injection)  Genitourinary: Negative  Musculoskeletal: Positive for arthralgias  Skin: Negative  Allergic/Immunologic: Positive for environmental allergies  Neurological: Negative  Hematological: Negative  Psychiatric/Behavioral: Negative  Physical Exam:  Physical Exam    Imaging:No results found        LABS:    CBC  Diff   Lab Results   Component Value Date/Time    WBC 7 18 05/24/2018 04:44 AM    WBC 3 79 (L) 02/19/2015 06:51 AM    HGB 12 7 05/24/2018 04:44 AM    HGB 14 6 02/19/2015 06:51 AM    HCT 37 2 05/24/2018 04:44 AM    HCT 42 7 02/19/2015 06:51 AM    RBC 4 00 05/24/2018 04:44 AM    RBC 4 63 02/19/2015 06:51 AM    MCV 93 05/24/2018 04:44 AM    MCV 92 02/19/2015 06:51 AM    MCHC 34 1 05/24/2018 04:44 AM    MCHC 34 2 02/19/2015 06:51 AM    MCH 31 8 05/24/2018 04:44 AM    MCH 31 5 02/19/2015 06:51 AM    RDW 12 5 05/24/2018 04:44 AM    RDW 12 7 02/19/2015 06:51 AM    MPV 8 9 05/24/2018 04:44 AM    MPV 9 3 02/19/2015 06:51 AM    Lab Results   Component Value Date/Time    LYMPHSABS 1 07 05/16/2018 01:37 PM    LYMPHSABS 1 14 02/19/2015 06:51 AM    EOSABS 0 14 05/16/2018 01:37 PM    EOSABS 0 15 02/19/2015 06:51 AM    MONOSABS 0 34 05/16/2018 01:37 PM    MONOSABS 0 42 02/19/2015 06:51 AM    BASOSABS 0 01 05/16/2018 01:37 PM    BASOSABS 0 01 02/19/2015 06:51 AM        Basic Metabolic Profile    Lab Results   Component Value Date/Time    SODIUM 141 05/24/2018 04:44 AM    CO2 27 05/24/2018 04:44 AM    CO2 29 02/19/2015 06:51 AM    ANIONGAP 3 (L) 02/19/2015 06:51 AM    Lab Results   Component Value Date/Time    BUN 10 05/24/2018 04:44 AM    BUN 16 02/19/2015 06:51 AM    CREATININE 0 97 05/24/2018 04:44 AM    CREATININE 0 84 02/19/2015 06:51 AM    GLUCOSE 96 02/19/2015 06:51 AM              Discussion/Summary:

## 2021-04-20 NOTE — PROGRESS NOTES
Follow-up - Radiation Oncology   Uzair Hua 1950 79 y o  male [id number]      History of Present Illness   Cancer Staging  No matching staging information was found for the patient  Uzair Hua is a 79y o  year old male with a history of [place] Adrian Yemi is W 50 y o  year old male with pretreatment PSA of 12 2, pT2Nx, GS 4+3=7, adenocarcinoma of the prostate, status post radical prostatectomy 5/23/18  He initiated ADT on 2/11/19, and received  Lupron  His pelvic/prostate bed radiation therapy was completed 6/7/19       A virtual follow up was done on 7/21/20     PSA 7/6/20 <0 01           3/9/21 <0 01     9/15/20 Mallory Peralta [facility]  Hot flashes are improving     3/29/21 Rita Mariscal [facility]  Having hot flashes but does not want Megace     10/12/21 Trevon Burnette MD      Interval History: as described above  Historical Information   Oncology History   Prostate cancer ([facility] )   2/2018 Initial Diagnosis    Prostate cancer ([facility] )     2/1/2018 Biopsy    Prostate Biopsy (Dr Kosta Dorantes)    A  Prostate, JUAN DANIEL, core needle biopsies:             - Benign prostate glands  - No malignancy is identified, supported on a prostate triple stain, performed on slide A2 with an appropriate control      B  Prostate, LCZ, core needle biopsy:             - Rare atypical prostate glands (less than 5% of the core biopsy), suspicious for prostatic adenocarcinoma, Min score 3 + 3 = 6, Prognostic Grade I              - Focal loss of staining for basal cell markers (p63 and ) is identified, and positive luminal staining for p504s is noted, (prostate triple stain, performed with an appropriate control)      C  Prostate, RCZ, core needle biopsy:             - Prostatic adenocarcinoma, Rainbow score 3 + 4 = 7, Prognostic Grade Grade II, involving approximately 30% of 1 of 2 core biopsies              - Less than 10% Rainbow grade 4 prostatic adenocarcinoma             - High-grade PIN  - Loss of staining for basal cell markers (p63 and ) is identified, and positive luminal staining for p504s is noted, (prostate triple stain, performed with an appropriate control)      D  Prostate, RPZ, core needle biopsies:             - Prostatic adenocarcinoma, Min score 3 + 4 = 7, Prognostic Grade Group II, involving 2 of 4 core biopsies (80%, 75%)  - Approximately 30% Rome grade 4 prostatic adenocarcinoma is identified on each core  - Prostatic adenocarcinoma, Rome score 3 + 3 = 6, Prognostic Grade Group I, involving less than 5% of 1 of 4 core biopsies              - Loss of staining for basal cell markers (p63 and ) is identified, and positive luminal staining for p504s is noted, (prostate triple stain, performed with an appropriate control)           5/23/2018 Surgery    Prostate, radical prostatectomy: (Dr Bhavesh Garcia)  -  Prostatic adenocarcinoma, acinar type, Rome grade 4+3=7 (see synoptic report)      1  Specimen identification:       - Procedure:  Radical prostatectomy     - Prostate size and weight:  4 9 x 4 2 x 4 1 cm, 56g     - Lymph node sampling:  Not performed   2  Tumor     - Histologic type:  Acinar adenocarcinoma     - Histologic Grade: Min Pattern:       * primary pattern:  4     * secondary pattern:  3     * tertiary pattern:  N/A     * total Min Score: 7      * Grade Group:  3     * Percentage of pattern 4:60-70%     * Percentage of pattern 5: N/A     * Intraductal carcinoma: Not identified    - Tumor quantitation:  5-10%    - Extraprostatic extension (pT2): Not identified    - Urinary bladder neck invasion:  Not identified    - Seminal vesicle invasion:  Not identified   5  Margins: All surgical resection margins negative for carcinoma   6  Margin positivity in area of extraprostatic extension:  N/A   7  Treatment effect on carcinoma:  No known prior therapy   8   Lymph-vascular invasion:  Not identified (confirmed by immunohistochemical stains performed with appropriate controls on block A31 for CD31 and D2-40)   9  Perineural invasion:  Present   10  Regional lymph nodes (pNX):  No lymph nodes submitted  11  Additional pathologic findings:  atrophy  12  Ancillary studies:  N/A  13  PSA: No recent studies available  14   Best representative block if additional studies are needed:  A30  15  8th Ed AJCC Tumor Stage:  at least Stage  I -pT2, pNx, Min's score  4+3=7     2/11/2019 -  Hormone Therapy    Lupron Depot IM injection      4/16/2019 - 6/7/2019 Radiation    Treatment:  Course: C1    Plan ID Energy Fractions Dose per Fraction (cGy) Dose Correction (cGy) Total Dose Delivered (cGy) Elapsed Days   CD Pelvis 6X 12 / 12 180 0 2,160 16   Whole Pelvis 6X 25 / 25 180 0 4,500 35              Past Medical History:   Diagnosis Date    Basal cell carcinoma     Basal cell carcinoma     Hyperlipidemia     Hypertension     Hypertension     last assesed 12-22-11    PONV (postoperative nausea and vomiting)     Pro[facility] [address] [facility] )     Seizures (HCC)     simple complex partial seizure--last seizure in 2004    Urinary incontinence      Past Surgical History:   Procedure Laterality Date    INGUINAL HERNIA REPAIR      [place] LAP,PROSTATECTOMY,RADICAL,W/NERVE SPARE,INCL ROBOTIC N/A 5/23/2018    Procedure: Kym Litten W ROBOTICS;  Surgeon: Jennifer Richardson MD;  [facility];  Service: Urology    PROSTATE BIOPSY  02/01/2018    Dr Sneha Velazquez  05/2018       Social History   Social History     Substance and Sexual Activity   Alcohol Use No     Social History     Substance and Sexual Activity   Drug Use No     Social History     Tobacco Use   Smoking Status Never Smoker   Smokeless Tobacco Never Used         Meds/Allergies     Current Outpatient Medications:     aspirin (ASPIRIN LOW DOSE) 81 MG tablet, Take 1 tablet by mouth daily, Disp: , Rfl:     calcium citrate-vitamin D (CITRACAL+D) 315-200 MG-UNIT per tablet, Take by mouth 2 (two) times a day  , Disp: , Rfl:     clotrimazole-betamethasone (LOTRISONE) 1-0 05 % cream, Apply 1 application topically 2 (two) times a day , Disp: , Rfl:     fexofenadine (ALLEGRA) 180 MG tablet, Take 1 tablet by mouth daily, Disp: , Rfl:     levETIRAcetam (KEPPRA) 1000 MG tablet, Take 1 tablet by mouth 2 (two) times a day, Disp: , Rfl:     lisinopril (ZESTRIL) 5 mg tablet, Take 1 tablet by mouth daily, Disp: , Rfl:     Melatonin 5 MG TABS, Take 5 mg by mouth daily at bedtime  , Disp: , Rfl:     Multiple Vitamin (MULTIVITAMIN) tablet, Take 1 tablet by mouth daily, Disp: , Rfl:     sildenafil (VIAGRA) 50 MG tablet, Take 2 tablets (100 mg total) by mouth daily as needed for erectile dysfunction, Disp: 5 tablet, Rfl: 0    simvastatin (ZOCOR) 40 mg tablet, Take 1 tablet by mouth, Disp: , Rfl:   Allergies   Allergen Reactions    Molds & Smuts      Other reaction(s): Respiratory Distress  Congestion, sore throat, coughing  seasonal    Other      Other reaction(s): Respiratory Distress  Congestion, sore throat, coughing  Review of Systems   Constitutional: Negative for activity change, appetite change and fever  HENT: Negative for congestion, sneezing and sore throat  Eyes: Negative  Respiratory: Negative for cough and shortness of breath  Cardiovascular: Negative for chest pain and leg swelling  Gastrointestinal: Negative for abdominal pain, blood in stool, diarrhea and rectal pain  Endocrine: Negative  Genitourinary: Negative for difficulty urinating, dysuria and hematuria  Musculoskeletal: Negative for arthralgias, back pain and myalgias  Skin: Negative  Allergic/Immunologic: Negative  Neurological: Negative for dizziness, weakness, numbness and headaches  Hematological: Negative for adenopathy  Psychiatric/Behavioral: Negative              OBJECTIVE:   /81 (BP Location: Right arm, Patient Position: Sitting)   Pulse 69   Temp (!) 97 °F (36 1 °C) (Temporal)   Resp 17   Ht 6' 1" (1 854 m)   Wt 91 1 kg (200 lb 12 8 oz)   BMI 26 49 kg/m²   Pain Assessment:  0  Karnofsky: 100 - Fully active, able to carry on all pre-disease performed without restriction    Physical Exam  Constitutional:       Appearance: Normal appearance  He is normal weight  HENT:      Nose: No congestion  Mouth/Throat:      Pharynx: Oropharynx is clear  Eyes:      Extraocular Movements: Extraocular movements intact  Pupils: Pupils are equal, round, and reactive to light  Neck:      Musculoskeletal: Normal range of motion and neck supple  Cardiovascular:      Rate and Rhythm: Normal rate and regular rhythm  Heart sounds: Normal heart sounds  Pulmonary:      Breath sounds: Normal breath sounds  Abdominal:      Palpations: Abdomen is soft  There is no mass  Tenderness: There is no abdominal tenderness  Genitourinary:     Comments: Defer rectal examination due to very low PSA value  Musculoskeletal: Normal range of motion  General: No swelling  Skin:     General: Skin is warm  Findings: No lesion or rash  Neurological:      General: No focal deficit present  Mental Status: He is alert and oriented to person, place, and time  Mental status is at baseline  Psychiatric:         Mood and Affect: Mood normal          Behavior: Behavior normal               RESULTS    Lab Results: No results found for this or any previous visit (from the past 672 hour(s))  Imaging Studies:No results found  Assessment/Plan:  No orders of the defined types were placed in this encounter  Gloria Gates is a 79y o  year old male with   Stage II B [place] 7 adenocarcinoma prostate completed salvage radiation therapy IMRT 3 years ago and 1 dose of Lupron  He is still experiencing hot flashes the but refused to take Megace  He has an appointment to see Dr Ozzie Whitman in October of this year    Discussed with him and he agrees will see him as needed  Naveen Menon MD  4/94/2488,41:96 AM    Portions of the record may have been created with voice recognition software   Occasional wrong word or "sound a like" substitutions may have occurred due to the inherent limitations of voice recognition software   Read the chart carefully and recognize, using context, where substitutions have occurred

## 2021-10-12 ENCOUNTER — TELEPHONE (OUTPATIENT)
Dept: UROLOGY | Facility: MEDICAL CENTER | Age: 71
End: 2021-10-12

## 2021-10-12 ENCOUNTER — OFFICE VISIT (OUTPATIENT)
Dept: UROLOGY | Facility: AMBULATORY SURGERY CENTER | Age: 71
End: 2021-10-12
Payer: MEDICARE

## 2021-10-12 VITALS
WEIGHT: 196 LBS | BODY MASS INDEX: 26.55 KG/M2 | HEIGHT: 72 IN | DIASTOLIC BLOOD PRESSURE: 82 MMHG | SYSTOLIC BLOOD PRESSURE: 120 MMHG | HEART RATE: 66 BPM

## 2021-10-12 DIAGNOSIS — C61 PROSTATE CANCER (HCC): Primary | ICD-10-CM

## 2021-10-12 PROCEDURE — 99214 OFFICE O/P EST MOD 30 MIN: CPT | Performed by: UROLOGY

## 2021-10-12 RX ORDER — SIMVASTATIN 40 MG
40 TABLET ORAL
COMMUNITY
Start: 2021-05-21

## 2021-10-12 RX ORDER — LISINOPRIL 5 MG/1
TABLET ORAL
COMMUNITY
Start: 2021-05-21

## 2021-10-12 RX ORDER — LEVETIRACETAM 1000 MG/1
TABLET ORAL
COMMUNITY
Start: 2021-07-02

## 2021-10-16 ENCOUNTER — IMMUNIZATIONS (OUTPATIENT)
Dept: FAMILY MEDICINE CLINIC | Facility: HOSPITAL | Age: 71
End: 2021-10-16

## 2021-10-16 DIAGNOSIS — Z23 ENCOUNTER FOR IMMUNIZATION: Primary | ICD-10-CM

## 2021-10-16 PROCEDURE — 91300 SARS-COV-2 / COVID-19 MRNA VACCINE (PFIZER-BIONTECH) 30 MCG: CPT

## 2021-10-16 PROCEDURE — 0001A SARS-COV-2 / COVID-19 MRNA VACCINE (PFIZER-BIONTECH) 30 MCG: CPT

## 2021-10-16 NOTE — PROGRESS NOTES
Assessment:  1  Onychomycosis Left hallux nail  2  Ingrown nail Left hallux nail    Plan:  1  Sample of distal Left hallux nail plate obtained and sent dry to [facility] pathology for KOH smear and fungal culture  2  Ingrown nail margin removed lateral Left hallux  Patient instructed to dress daily with bacitracin ointment and DSD once daily x 7 days  Nail removal  Date/Time: 7/16/2019 8:59 AM  Performed by: Madeline Flores DPM  Authorized by: Madeline Flores DPM     Patient location:  Clinic  Indications / Diagnosis:  Ingrown toenail  Consent:     Consent obtained:  Verbal    Consent given by:  Patient    Risks discussed:  Bleeding, incomplete removal, infection and pain  Universal protocol:     Procedure explained and questions answered to patient or proxy's satisfaction: yes      Site/side marked: yes      Immediately prior to procedure a time out was called: yes      Patient identity confirmed:  Verbally with patient  Location:     Foot:  L big toe  Pre-procedure details:     Skin preparation:  Alcohol  Anesthesia (see MAR for exact dosages): Anesthesia method:  None (Ethyl Chloride)  Nail Removal:     Nail removed:  Vertical    Nail bed sutured: no      Removed nail replaced and anchored: no    Trephination:     Subungual hematoma drained: no    Ingrown nail:     Wedge excision of skin: no      Nail matrix removed or ablated:  None  Post-procedure details:     Dressing:  Antibiotic ointment    Patient tolerance of procedure: Tolerated well, no immediate complications          Problem List Items Addressed This Visit        Musculoskeletal and Integument    Tinea unguium - Primary    Relevant Orders    KOH prep    Fungal culture    Ingrowing nail             Diagnoses and all orders for this visit:    Tinea unguium  -     KOH prep; Future  -     Fungal culture; Future    Ingrowing nail          Subjective:      Patient ID: Mariaelena Ling is a 71 y o  male      Carmela Barbosa is here for the first time to see me with two distinct problems  His more acute issue is tenderness at the lateral hallux nail margin x about a week  It's pretty painful, worsening daily  He denies self treatment  His second issue is on the same nail  He traumatized the nail about 2 years ago and notes slowly progressing nail thickness and discoloration  He denies self treatment  The following portions of the patient's history were reviewed and updated as appropriate: allergies, current medications, past family history, past medical history, past social history, past surgical history and problem list     Review of Systems   Constitutional: Negative  Respiratory: Negative  Cardiovascular: Negative  Gastrointestinal: Negative  Genitourinary: Negative for difficulty urinating  Musculoskeletal: Negative  Neurological: Negative  Hematological: Negative  Psychiatric/Behavioral: Negative  Objective:      /73   Pulse 70   Ht 6' 1" (1 854 m)   Wt 87 5 kg (193 lb)   BMI 25 46 kg/m²          Physical Exam   Constitutional: He is oriented to person, place, and time  He appears well-developed and well-nourished  No distress  HENT:   Head: Normocephalic  Eyes: Pupils are equal, round, and reactive to light  Neck: No JVD present  Cardiovascular:   Pulses:       Dorsalis pedis pulses are 3+ on the right side, and 3+ on the left side  Posterior tibial pulses are 3+ on the right side, and 3+ on the left side  Pulmonary/Chest: Effort normal and breath sounds normal    Abdominal: Soft  Bowel sounds are normal    Musculoskeletal: Normal range of motion  Right foot: There is normal range of motion and no deformity  Left foot: There is normal range of motion and no deformity  Feet:   Right Foot:   Protective Sensation: 10 sites tested  10 sites sensed  Skin Integrity: Negative for ulcer, blister, skin breakdown, erythema, warmth, callus or dry skin  Left Foot:   Protective Sensation: 10 sites tested  10 sites sensed  Skin Integrity: Negative for ulcer, blister, skin breakdown, erythema, warmth, callus or dry skin  Neurological: He is alert and oriented to person, place, and time  Skin: Skin is warm  Capillary refill takes less than 2 seconds  He is not diaphoretic  Psychiatric: He has a normal mood and affect  Nursing note and vitals reviewed  I note the Left hallux nail is discolored with a grey-yellow tone at the distal 1/3 corresponding to an area of thickening with subungual crumbling debris  I note the same nail has inflammed skin at the lateral nail border with acute pain on palpation and serous drainage  Stretcher Alarms/[place]ourly Rounding

## 2021-11-11 DIAGNOSIS — N52.31 ERECTILE DYSFUNCTION AFTER RADICAL PROSTATECTOMY: ICD-10-CM

## 2021-11-11 RX ORDER — SILDENAFIL 50 MG/1
TABLET, FILM COATED ORAL
Qty: 5 TABLET | Refills: 0 | Status: SHIPPED | OUTPATIENT
Start: 2021-11-11

## 2022-04-08 ENCOUNTER — OFFICE VISIT (OUTPATIENT)
Dept: UROLOGY | Facility: AMBULATORY SURGERY CENTER | Age: 72
End: 2022-04-08
Payer: MEDICARE

## 2022-04-08 VITALS
HEIGHT: 72 IN | HEART RATE: 40 BPM | WEIGHT: 200.4 LBS | SYSTOLIC BLOOD PRESSURE: 140 MMHG | DIASTOLIC BLOOD PRESSURE: 80 MMHG | BODY MASS INDEX: 27.14 KG/M2

## 2022-04-08 DIAGNOSIS — N52.31 ERECTILE DYSFUNCTION AFTER RADICAL PROSTATECTOMY: ICD-10-CM

## 2022-04-08 DIAGNOSIS — C61 PROSTATE CANCER (HCC): Primary | ICD-10-CM

## 2022-04-08 PROCEDURE — 99213 OFFICE O/P EST LOW 20 MIN: CPT | Performed by: NURSE PRACTITIONER

## 2022-04-08 NOTE — PROGRESS NOTES
4/8/2022    Jacinto Nguyen  1950  [id number]      Assessment  -[place] 7 prostate cancer s/p radical prostatectomy (5/2019), XRT (6/2019)  -Erectile dysfunction     Discussion/Plan  Nicole Campos is a 70 y o  male being managed by our office    1  [place] 7 prostate cancer s/p radical prostatectomy (5/2019), XRT (6/2019)- reviewed results of his recent PSA which remains <0 01  Continue to monitor PSA, and plan to recheck in 6 months  2  Erectile dysfunction- patient defers medication management at this time    Follow up in 6 months with PSA  He was advised to call sooner with any issues        -All questions answered, patient agrees with plan      History of Present Illness  70 y o  male with a history of [place] 7 prostate cancer and ED presents today for follow up  He was last seen in the office in October 2021  He underwent robot assisted laparoscopic prostatectomy in 2018 by Dr Laura Baird  Pathology noted negative surgical margins and no extracapsular extension or seminal vesicle invasion  His PSA slowly began to rise and he received adjuvant radiation therapy  PSA has since been undetectable  He denies any lower urinary tract symptoms, gross hematuria, dysuria, or incontinence  He continues to experience erectile dysfunction, but defers medication management  No changes to his overall health  Review of Systems  Review of Systems   Constitutional: Negative  HENT: Negative  Respiratory: Negative  Cardiovascular: Negative  Gastrointestinal: Negative  Genitourinary: Negative for decreased urine volume, difficulty urinating, dysuria, flank pain, frequency, hematuria and urgency  Musculoskeletal: Negative  Skin: Negative  Neurological: Negative  Psychiatric/Behavioral: Negative        AUA SYMPTOM SCORE      Most Recent Value   AUA SYMPTOM SCORE    How often have you had a sensation of not emptying your bladder completely after you finished urinating? 0 (P)     How often have you had to urinate again less than two hours after you finished urinating? 0 (P)     How often have you found you stopped and started again several times when you urinate? 0 (P)     How often have you found it difficult to postpone urination? 0 (P)     How often have you had a weak urinary stream? 0 (P)     How often have you had to push or strain to begin urination? 0 (P)     How many times did you most typically get up to urinate from the time you went to bed at night until the time you got up in the morning? 0 (P)     Quality of Life: If you were to spend the rest of your life with your urinary condition just the way it is now, how would you feel about that? 1 (P)     AUA SYMPTOM SCORE 0 (P)           Past Medical History  Past Medical History:   Diagnosis Date    Basal cell carcinoma     Basal cell carcinoma     Hyperlipidemia     Hypertension     Hypertension     last assesed 12-22-11    PONV (postoperative nausea and vomiting)     [facility]     Seizures ([facility] )     simple complex partial seizure--last seizure in 2004    Urinary incontinence        Past Social History  Past Surgical History:   Procedure Laterality Date    INGUINAL HERNIA REPAIR      [place] LAP,PROSTATECTOMY,RADICAL,W/NERVE SPARE,INCL ROBOTIC N/A 5/23/2018    Procedure: PROSTATECTOMY RADICAL W ROBOTICS;  Surgeon: Pan Salvador MD;  [facility];  Service: Urology    PROSTATE BIOPSY  02/01/2018    Dr Shanice Alaniz  05/2018       Past Family History  Family History   Problem Relation Age of Onset    No Known Problems Mother     Cancer Father     Cancer Brother     Cancer Sister        Past Social history  Social History     Socioeconomic History    Marital status: /Civil Union     Spouse name: Not on file    Number of children: Not on file    Years of education: Not on file    Highest education level: Not on file   Occupational History    Not on file   Tobacco Use    Smoking status: Never Smoker    Smokeless tobacco: Never Used   Vaping Use    Vaping Use: Never used   Substance and Sexual Activity    Alcohol use: No    Drug use: No    Sexual activity: Not on file   Other Topics Concern    Not on file   Social History Narrative    Not on file     Social Determinants of Health     Financial Resource Strain: Not on file   Food Insecurity: Not on file   Transportation Needs: Not on file   Physical Activity: Not on file   Stress: Not on file   Social Connections: Not on file   Intimate Partner Violence: Not on file   Housing Stability: Not on file       Current Medications  Current Outpatient Medications   Medication Sig Dispense Refill    aspirin (ASPIRIN LOW DOSE) 81 MG tablet Take 1 tablet by mouth daily      calcium citrate-vitamin D (CITRACAL+D) 315-200 MG-UNIT per tablet Take by mouth 2 (two) times a day        clotrimazole-betamethasone (LOTRISONE) 1-0 05 % cream Apply 1 application topically 2 (two) times a day       fexofenadine (ALLEGRA) 180 MG tablet Take 1 tablet by mouth daily      levETIRAcetam (KEPPRA) 1000 MG tablet TAKE 1 TABLET BY MOUTH  TWICE DAILY      lisinopril (ZESTRIL) 5 mg tablet Take 1 tablet by mouth daily      Melatonin 5 MG TABS Take 5 mg by mouth daily at bedtime        Multiple Vitamin (MULTIVITAMIN) tablet Take 1 tablet by mouth daily      simvastatin (ZOCOR) 40 mg tablet Take 40 mg by mouth      levETIRAcetam (KEPPRA) 1000 MG tablet Take 1 tablet by mouth 2 (two) times a day      lisinopril (ZESTRIL) 5 mg tablet TAKE 1 TABLET BY MOUTH  DAILY      sildenafil (VIAGRA) 50 MG tablet TAKE 2 TABLETS BY MOUTH  DAILY AS NEEDED FOR  ERECTILE DYSFUNCTION 5 tablet 0    simvastatin (ZOCOR) 40 mg tablet Take 1 tablet by mouth       No current facility-administered medications for this visit  Allergies  Allergies   Allergen Reactions    Molds & Smuts      Other reaction(s): Respiratory Distress  Congestion, sore throat, coughing   seasonal    Other Other reaction(s): Respiratory Distress  Congestion, sore throat, coughing  Past Medical History, Social History, Family History, medications and allergies were reviewed  Vitals  Vitals:    04/08/22 0754   BP: 140/80   Pulse: (!) 40   Weight: 90 9 kg (200 lb 6 4 oz)   Height: 6' (1 829 m)       Physical Exam  Physical Exam  Constitutional:       Appearance: Normal appearance  He is well-developed  HENT:      Head: Normocephalic  Eyes:      Pupils: Pupils are equal, round, and reactive to light  Pulmonary:      Effort: Pulmonary effort is normal    Abdominal:      Palpations: Abdomen is soft  Musculoskeletal:         General: Normal range of motion  Cervical back: Normal range of motion  Skin:     General: Skin is warm and dry  Neurological:      General: No focal deficit present  Mental Status: He is alert and oriented to person, place, and time  Psychiatric:         Mood and Affect: Mood normal          Behavior: Behavior normal          Thought Content: Thought content normal          Judgment: Judgment normal          Results    I have personally reviewed all pertinent lab results and reviewed with patient  No results found for: PSA  Lab Results   Component Value Date    GLUCOSE 96 02/19/2015    CALCIUM 8 5 05/24/2018     02/19/2015    K 4 7 05/24/2018    CO2 27 05/24/2018     (H) 05/24/2018    BUN 10 05/24/2018    CREATININE 0 97 05/24/2018     Lab Results   Component Value Date    WBC 7 18 05/24/2018    HGB 12 7 05/24/2018    HCT 37 2 05/24/2018    MCV 93 05/24/2018     (L) 05/24/2018     No results found for this or any previous visit (from the past 1 hour(s))

## 2022-10-24 ENCOUNTER — OFFICE VISIT (OUTPATIENT)
Dept: UROLOGY | Facility: AMBULATORY SURGERY CENTER | Age: 72
End: 2022-10-24
Payer: MEDICARE

## 2022-10-24 VITALS
HEIGHT: 73 IN | OXYGEN SATURATION: 98 % | DIASTOLIC BLOOD PRESSURE: 82 MMHG | WEIGHT: 200 LBS | SYSTOLIC BLOOD PRESSURE: 144 MMHG | BODY MASS INDEX: 26.51 KG/M2 | HEART RATE: 50 BPM

## 2022-10-24 DIAGNOSIS — C61 PROSTATE CANCER (HCC): Primary | ICD-10-CM

## 2022-10-24 PROCEDURE — 99213 OFFICE O/P EST LOW 20 MIN: CPT | Performed by: NURSE PRACTITIONER

## 2022-10-24 NOTE — PROGRESS NOTES
10/24/2022    Cristino James  1950  [id number]      Assessment  -[place] 7 prostate cancer s/p radical prostatectomy (5/2018), XRT (6/2019)  -Erectile dysfunction    Discussion/Plan  Keiko Cortes is a 67 y o  male being managed by our office    1  Min 7 prostate cancer s/p radical prostatectomy (5/2018), XRT (6/2019)- we reviewed the results of his recent PSA which remains <0 1  Patient has no complaints at this time  He defers management for erectile dysfunction  Plan to repeat PSA in 6 months  He requests that we call with his results  If PSA remains undetectable, he will return to the office in 1 year with next PSA  Patient was advised to call sooner with any questions or issues     -All questions answered, patient agrees with plan      History of Present Illness  67 y o  male with a history of Gl 7 prostate cancer and ED presents today for follow up  Patient underwent robot assisted laparoscopic prostatectomy in 2018 by Dr Paulo Barron  Pathology noted negative surgical margins and no extracapsular extension or seminal vesicle invasion  His PSA slowly began to rise and he received adjuvant radiation therapy  PSA has since been undetectable  He denies any lower urinary tract symptoms, gross hematuria, dysuria, or incontinence  Patient continues to experience erectile dysfunction, but has deferred medication management  No additional changes to his overall health  Review of Systems  Review of Systems   Constitutional: Negative  HENT: Negative  Respiratory: Negative  Cardiovascular: Negative  Gastrointestinal: Negative  Genitourinary: Negative for decreased urine volume, difficulty urinating, dysuria, flank pain, frequency, hematuria and urgency  Musculoskeletal: Negative  Skin: Negative  Neurological: Negative  Psychiatric/Behavioral: Negative        AUA SYMPTOM SCORE    Flowsheet Row Most Recent Value   AUA SYMPTOM SCORE    How often have you had a sensation of not emptying your bladder completely after you finished urinating? 0 (P)     How often have you had to urinate again less than two hours after you finished urinating? 0 (P)     How often have you found you stopped and started again several times when you urinate? 0 (P)     How often have you found it difficult to postpone urination? 0 (P)     How often have you had a weak urinary stream? 0 (P)     How often have you had to push or strain to begin urination? 0 (P)     How many times did you most typically get up to urinate from the time you went to bed at night until the time you got up in the morning? 0 (P)     Quality of Life: If you were to spend the rest of your life with your urinary condition just the way it is now, how would you feel about that? 1 (P)     AUA SYMPTOM SCORE 0 (P)           Past Medical History  Past Medical History:   Diagnosis Date   • Basal cell carcinoma    • Basal cell carcinoma    • Hyperlipidemia    • Hypertension    • Hypertension     last assesed 12-22-11   • PONV (postoperative nausea and vomiting)    • Prostate cancer ([facility] [address] )    • Seizures ([facility] [address] )     simple complex partial seizure--last seizure in 2004   • Urinary incontinence        Past Social History  Past Surgical History:   Procedure Laterality Date   • INGUINAL HERNIA REPAIR     • KS LAP,PROSTATECTOMY,RADICAL,W/NERVE SPARE,INCL ROBOTIC N/A 5/23/2018    Procedure: PROSTATECTOMY RADICAL W ROBOTICS;  Surgeon: Flakita Madsen MD;  Location: [facility];  Service: Urology   • PROSTATE BIOPSY  02/01/2018    Dr Jasmin Smart    • PROSTATE SURGERY     • PROSTATECTOMY  05/2018       Past Family History  Family History   Problem Relation Age of Onset   • No Known Problems Mother    • Cancer Father    • Cancer Brother    • Cancer Sister        Past Social history  Social History     Socioeconomic History   • Marital status: /Civil Union     Spouse name: Not on file   • Number of children: Not on file   • Years of education: Not on file   • Highest education level: Not on file   Occupational History   • Not on file   Tobacco Use   • Smoking status: Never Smoker   • Smokeless tobacco: Never Used   Vaping Use   • Vaping Use: Never used   Substance and Sexual Activity   • Alcohol use: No   • Drug use: No   • Sexual activity: Not on file   Other Topics Concern   • Not on file   Social History Narrative   • Not on file     Social Determinants of Health     Financial Resource Strain: Not on file   Food Insecurity: Not on file   Transportation Needs: Not on file   Physical Activity: Not on file   Stress: Not on file   Social Connections: Not on file   Intimate Partner Violence: Not on file   Housing Stability: Not on file       Current Medications  Current Outpatient Medications   Medication Sig Dispense Refill   • aspirin (ASPIRIN LOW DOSE) 81 MG tablet Take 1 tablet by mouth daily     • calcium citrate-vitamin D (CITRACAL+D) 315-200 MG-UNIT per tablet Take by mouth 2 (two) times a day       • clotrimazole-betamethasone (LOTRISONE) 1-0 05 % cream Apply 1 application topically 2 (two) times a day      • fexofenadine (ALLEGRA) 180 MG tablet Take 1 tablet by mouth daily     • levETIRAcetam (KEPPRA) 1000 MG tablet Take 1 tablet by mouth 2 (two) times a day     • levETIRAcetam (KEPPRA) 1000 MG tablet TAKE 1 TABLET BY MOUTH  TWICE DAILY     • lisinopril (ZESTRIL) 5 mg tablet Take 1 tablet by mouth daily     • lisinopril (ZESTRIL) 5 mg tablet TAKE 1 TABLET BY MOUTH  DAILY     • Melatonin 5 MG TABS Take 5 mg by mouth daily at bedtime       • Multiple Vitamin (MULTIVITAMIN) tablet Take 1 tablet by mouth daily     • sildenafil (VIAGRA) 50 MG tablet TAKE 2 TABLETS BY MOUTH  DAILY AS NEEDED FOR  ERECTILE DYSFUNCTION 5 tablet 0   • simvastatin (ZOCOR) 40 mg tablet Take 1 tablet by mouth     • simvastatin (ZOCOR) 40 mg tablet Take 40 mg by mouth       No current facility-administered medications for this visit         Allergies  Allergies   Allergen Reactions   • Molds & Smuts      Other reaction(s): Respiratory Distress  Congestion, sore throat, coughing  seasonal   • Other      Other reaction(s): Respiratory Distress  Congestion, sore throat, coughing  Past Medical History, Social History, Family History, medications and allergies were reviewed  Vitals  There were no vitals filed for this visit  Physical Exam  Physical Exam  Constitutional:       Appearance: Normal appearance  He is well-developed  HENT:      Head: Normocephalic  Eyes:      Pupils: Pupils are equal, round, and reactive to light  Pulmonary:      Effort: Pulmonary effort is normal    Abdominal:      Palpations: Abdomen is soft  Musculoskeletal:         General: Normal range of motion  Cervical back: Normal range of motion  Skin:     General: Skin is warm and dry  Neurological:      General: No focal deficit present  Mental Status: He is alert and oriented to person, place, and time  Psychiatric:         Mood and Affect: Mood normal          Behavior: Behavior normal          Thought Content: Thought content normal          Judgment: Judgment normal          Results    I have personally reviewed all pertinent lab results and reviewed with patient  No results found for: PSA  Lab Results   Component Value Date    GLUCOSE 96 02/19/2015    CALCIUM 8 5 05/24/2018     02/19/2015    K 4 7 05/24/2018    CO2 27 05/24/2018     (H) 05/24/2018    BUN 10 05/24/2018    CREATININE 0 97 05/24/2018     Lab Results   Component Value Date    WBC 7 18 05/24/2018    HGB 12 7 05/24/2018    HCT 37 2 05/24/2018    MCV 93 05/24/2018     (L) 05/24/2018     No results found for this or any previous visit (from the past 1 hour(s))

## 2022-11-29 ENCOUNTER — LAB REQUISITION (OUTPATIENT)
Dept: LAB | Facility: HOSPITAL | Age: 72
End: 2022-11-29

## 2022-11-29 DIAGNOSIS — K63.89 OTHER SPECIFIED DISEASES OF INTESTINE: ICD-10-CM

## 2022-11-29 DIAGNOSIS — Z12.11 ENCOUNTER FOR SCREENING FOR MALIGNANT NEOPLASM OF COLON: ICD-10-CM

## 2022-11-29 DIAGNOSIS — K63.5 POLYP OF COLON: ICD-10-CM

## 2023-07-10 ENCOUNTER — PATIENT MESSAGE (OUTPATIENT)
Dept: UROLOGY | Facility: AMBULATORY SURGERY CENTER | Age: 73
End: 2023-07-10

## 2023-07-10 ENCOUNTER — TELEPHONE (OUTPATIENT)
Dept: UROLOGY | Facility: CLINIC | Age: 73
End: 2023-07-10

## 2023-07-10 NOTE — TELEPHONE ENCOUNTER
Regarding: PSA results  Contact: [phone]  ----- Message from Polo Huizar RN sent at 7/10/2023  2:13 PM EDT -----  PSA scanned into chart from May     ----- Message from Milagro Angelo to Cristino Cox, [address] [place] [address] sent at 7/10/2023  1:50 PM -----   Miky Kay told me to contact the office about the results of my blood work. Do I need to come into the office  for a visit?   Flower Bernal

## 2023-07-10 NOTE — TELEPHONE ENCOUNTER
PSA less than 0.01.  Follow up in the office as scheduled for yearly eval. LAst seen in the office 10/24/2023

## 2023-09-05 DIAGNOSIS — Z85.46 H/O PROSTATE CANCER: Primary | ICD-10-CM

## 2023-10-04 ENCOUNTER — TELEPHONE (OUTPATIENT)
Dept: UROLOGY | Facility: AMBULATORY SURGERY CENTER | Age: 73
End: 2023-10-04

## 2023-10-04 NOTE — TELEPHONE ENCOUNTER
Called the patient to reschedule his appointment on 10- with Kevin Hernandez. Pt was offered 12- at [address] [place] [address] and would like the opinon of Kevin Hernandez if this is something he can wait until then? Return for call with results PSA in 6 months, then follow up in 1 year.

## 2023-12-13 ENCOUNTER — OFFICE VISIT (OUTPATIENT)
Dept: UROLOGY | Facility: CLINIC | Age: 73
End: 2023-12-13
Payer: MEDICARE

## 2023-12-13 VITALS
DIASTOLIC BLOOD PRESSURE: 78 MMHG | OXYGEN SATURATION: 99 % | HEART RATE: 77 BPM | WEIGHT: 182.8 LBS | SYSTOLIC BLOOD PRESSURE: 142 MMHG | BODY MASS INDEX: 24.12 KG/M2

## 2023-12-13 DIAGNOSIS — C61 PROSTATE CANCER (HCC): Primary | ICD-10-CM

## 2023-12-13 DIAGNOSIS — Z85.46 H/O PROSTATE CANCER: ICD-10-CM

## 2023-12-13 LAB — POST-VOID RESIDUAL VOLUME, ML POC: 57 ML

## 2023-12-13 PROCEDURE — 99214 OFFICE O/P EST MOD 30 MIN: CPT | Performed by: UROLOGY

## 2023-12-13 PROCEDURE — 51798 US URINE CAPACITY MEASURE: CPT | Performed by: UROLOGY

## 2023-12-13 NOTE — PATIENT INSTRUCTIONS
Get your PSA test in March. I will call you if it is concerning. Also if you get your results and it does not result in the [facility] system, and if those results are concerning, please call urology office. Your next PSA test will be in September 2024. You will get this test and see a practitioner at that time, preferably in [facility] office.

## 2023-12-13 NOTE — LETTER
December 13, 2023     Marcial May DO  [address] [place]    Patient: Juan Edgar   YOB: 1950   Date of Visit: 12/13/2023       Dear Dr. Trish Oliveira:    Thank you for referring Sepideh Mckenzie to me for evaluation. Below are my notes for this consultation. If you have questions, please do not hesitate to call me. I look forward to following your patient along with you. Sincerely,        Sloan Tavera DO        CC: No Recipients    Charles Davalos  12/13/2023  8:40 AM  Sign when Signing Visit    UROLOGY FOLLOW-UP ENCOUNTER    Juan Edgar is a 68 y.o. male with prostate cancer    Pertinent PMH/PSH: open inguinal hernia repair on L    No anticoagulation    Hx CaP s/p RALP for G7 disease in 2018. Path -margins/SVI/ELLIE. Received XRT in 2019 for BCR    Subsequent PSAs have been undetectable. PSA <0.01 on 9/19/23. Had ED since RALP. Failed oral therapy. Not interested in further treatment. Has some occasional leakage. Does not need to wear pads. Not bothered by this. PVR 57 cc on 12/13/23    Assessment and plan:     Prostate cancer    Patient with history of prostate cancer status post surgical removal in 2018. He then had biochemical recurrence in 2019. He received salvage radiation therapy. Since completing his radiation course in 2019, his PSAs have been undetectable. We reviewed his last PSA which was from 9/19/2023. This was undetectable. For now, since we are still within the 5-year window of his salvage radiation, we will continue 6-month PSA checks. He is due for his next PSA in March 2024. I ordered this for him. I told him if the results were concerning, the office will call. Additionally, he does often get his labs outside of [facility]. He is very in tune with his care and follows his labs regularly.   He knows that if his PSA is anything other than undetectable, that he needs to reach out to the office. He understands that there are times where outside facilities do not send labs over to [facility], so he will keep an eye out for the lab results. Assuming that that next PSA is undetectable, he will then be due for an additional PSA in September 2024. We will have him see a provider to review those results. He prefers to be seen in the [facility] office since it is much closer to him. We will try to arrange for this. We additionally discussed postoperative leakage. It is quite minimal for him right now. He gets occasional dribbling. He does not need to wear pads for this. No intervention is necessary. We also discussed erectile dysfunction. He did fail oral therapy after surgery. He is not interested in pursuing ICI or surgical management of erectile dysfunction. He knows that if he does become interested in this, he can reach out to the office and we can discuss further treatment. PLAN  -Will get PSA in March 2024. If results concerning will reach out to him.  -Will get another PSA in September 2024. He will see provider in office at that time. He would prefer [address] since he lives closer. At that point if PSA still undetectable, he can consider transitioning to yearly PSA since he would be 5 years out from salvage XRT. -He knows that if he becomes interested in future ED treatment, he can reach out to the office. Portions of the above record have been created with voice recognition software. Occasional wrong word or "sound alike" substitution may have occurred due to the inherent limitations of voice recognition software. Read the chart carefully and recognize, using context, where substitution may have occurred.       Cristino Dickinson DO        Chief Complaint     Prostate cancer      History of Present Illness     See summary above    No fevers or chills        The following portions of the patient's history were reviewed and updated as appropriate: allergies, current medications, past family history, past medical history, past social history, past surgical history and problem list.        AUA SYMPTOM SCORE      Flowsheet Row Most Recent Value   AUA SYMPTOM SCORE    How often have you had a sensation of not emptying your bladder completely after you finished urinating? 0 (P)    How often have you had to urinate again less than two hours after you finished urinating? 0 (P)    How often have you found you stopped and started again several times when you urinate? 0 (P)    How often have you found it difficult to postpone urination? 0 (P)    How often have you had a weak urinary stream? 0 (P)    How often have you had to push or strain to begin urination? 0 (P)    How many times did you most typically get up to urinate from the time you went to bed at night until the time you got up in the morning? 0 (P)    Quality of Life: If you were to spend the rest of your life with your urinary condition just the way it is now, how would you feel about that? 2 (P)    AUA SYMPTOM SCORE 0 (P)               Review of Systems     Review of Systems   Constitutional:  Negative for chills and fever. Respiratory:  Negative for cough and shortness of breath. Gastrointestinal:  Negative for abdominal pain. Genitourinary:  Negative for dysuria and hematuria. Neurological:  Negative for dizziness and headaches. Psychiatric/Behavioral:  Negative for agitation and behavioral problems. Allergies     Allergies   Allergen Reactions   • Molds & Smuts      Other reaction(s): Respiratory Distress  Congestion, sore throat, coughing. seasonal   • Other      Other reaction(s): Respiratory Distress  Congestion, sore throat, coughing. Physical Exam     Physical Exam  Constitutional:       General: He is not in acute distress. HENT:      Head: Normocephalic and atraumatic. Pulmonary:      Effort: Pulmonary effort is normal. No respiratory distress. Abdominal:      General: Abdomen is flat. Palpations: Abdomen is soft. Tenderness: There is no right CVA tenderness or left CVA tenderness. Skin:     General: Skin is warm and dry. Neurological:      General: No focal deficit present. Mental Status: He is alert and oriented to person, place, and time.    Psychiatric:         Mood and Affect: Mood normal.         Behavior: Behavior normal.             Vital Signs  Vitals:    12/13/23 0755   BP: 142/78   BP Location: Left arm   Patient Position: Sitting   Cuff Size: Standard   Pulse: 77   SpO2: 99%   Weight: 82.9 kg (182 lb 12.8 oz)         Current Medications       Current Outpatient Medications:   •  calcium citrate-vitamin D (CITRACAL+D) 315-200 MG-UNIT per tablet, Take by mouth 2 (two) times a day  , Disp: , Rfl:   •  fexofenadine (ALLEGRA) 180 MG tablet, Take 1 tablet by mouth daily, Disp: , Rfl:   •  levETIRAcetam (KEPPRA) 1000 MG tablet, TAKE 1 TABLET BY MOUTH  TWICE DAILY, Disp: , Rfl:   •  lisinopril (ZESTRIL) 5 mg tablet, TAKE 1 TABLET BY MOUTH  DAILY, Disp: , Rfl:   •  Multiple Vitamin (MULTIVITAMIN) tablet, Take 1 tablet by mouth daily, Disp: , Rfl:   •  simvastatin (ZOCOR) 40 mg tablet, Take 40 mg by mouth, Disp: , Rfl:   •  clotrimazole-betamethasone (LOTRISONE) 1-0.05 % cream, Apply 1 application topically 2 (two) times a day  (Patient not taking: Reported on 12/13/2023), Disp: , Rfl:   •  lisinopril (ZESTRIL) 5 mg tablet, Take 1 tablet by mouth daily (Patient not taking: Reported on 12/13/2023), Disp: , Rfl:   •  Melatonin 5 MG TABS, Take 5 mg by mouth daily at bedtime  , Disp: , Rfl:       Active Problems     Patient Active Problem List   Diagnosis   • Prostate cancer ([address])   • Tinea unguium   • Contusion of left great toe with damage to nail         Past Medical History     Past Medical History:   Diagnosis Date   • Basal cell carcinoma    • Basal cell carcinoma    • Hyperlipidemia    • Hypertension    • Hypertension     last serafin 12-22-11   • PONV (postoperative nausea and vomiting)    • Prostate cancer ([address])    • Seizures ([facility])     simple complex partial seizure--last seizure in 2004   • Urinary incontinence          Surgical History     Past Surgical History:   Procedure Laterality Date   • COLONOSCOPY     • INGUINAL HERNIA REPAIR     • GA LAPS SURG [id number] RPBIC RAD W/NRV SPARING ROBOT N/A 05/23/2018    Procedure: Sachi Zuniga W ROBOTICS;  Surgeon: Rosalie Wang MD;  Location: [facility];  Service: Urology   • PROSTATE BIOPSY  02/01/2018    Dr. Ed Ramos    • PROSTATE SURGERY     • PROSTATECTOMY  05/2018         Family History     Family History   Problem Relation Age of Onset   • No Known Problems Mother    • Cancer Father    • Cancer Brother    • Cancer Sister          Social History     Social History    Social History     Tobacco Use   Smoking Status Never   Smokeless Tobacco Never       Pertinent Lab Values     Lab Results   Component Value Date    CREATININE 0.97 05/24/2018       No results found for: "PSA"    PSA <0.01 on 9/19/23        Pertinent Imaging     N/A    Pertinent Pathology     N/A      I have spent 30 minutes with Patient  today in which greater than 50% of this time was spent in counseling/coordination of care regarding Diagnostic results, Prognosis, Risks and benefits of tx options, Instructions for management, Patient and family education, Importance of tx compliance, Impressions, Counseling / Coordination of care, Documenting in the medical record, Reviewing / ordering tests, medicine, procedures  , and Obtaining or reviewing history  . Please note this time includes cumulative time on the day of encounter, including reviewing medical records and/or coordinating care among the patient's other specialists.

## 2023-12-13 NOTE — PROGRESS NOTES
UROLOGY FOLLOW-UP ENCOUNTER    Linda Chavez is a 68 y.o. male with prostate cancer    Pertinent PMH/PSH: open inguinal hernia repair on L    No anticoagulation    Hx CaP s/p RALP for G7 disease in 2018. Path -margins/SVI/ELLIE. Received XRT in 2019 for BCR    Subsequent PSAs have been undetectable. PSA <0.01 on 9/19/23. Had ED since RALP. Failed oral therapy. Not interested in further treatment. Has some occasional leakage. Does not need to wear pads. Not bothered by this. PVR 57 cc on 12/13/23    Assessment and plan:     Prostate cancer    Patient with history of prostate cancer status post surgical removal in 2018. He then had biochemical recurrence in 2019. He received salvage radiation therapy. Since completing his radiation course in 2019, his PSAs have been undetectable. We reviewed his last PSA which was from 9/19/2023. This was undetectable. For now, since we are still within the 5-year window of his salvage radiation, we will continue 6-month PSA checks. He is due for his next PSA in March 2024. I ordered this for him. I told him if the results were concerning, the office will call. Additionally, he does often get his labs outside of [facility]. He is very in tune with his care and follows his labs regularly. He knows that if his PSA is anything other than undetectable, that he needs to reach out to the office. He understands that there are times where outside facilities do not send labs over to [facility], so he will keep an eye out for the lab results. Assuming that that next PSA is undetectable, he will then be due for an additional PSA in September 2024. We will have him see a provider to review those results. He prefers to be seen in the [facility] office since it is much closer to him. We will try to arrange for this. We additionally discussed postoperative leakage. It is quite minimal for him right now. He gets occasional dribbling.   He does not need to wear pads for this. No intervention is necessary. We also discussed erectile dysfunction. He did fail oral therapy after surgery. He is not interested in pursuing ICI or surgical management of erectile dysfunction. He knows that if he does become interested in this, he can reach out to the office and we can discuss further treatment. PLAN  -Will get PSA in March 2024. If results concerning will reach out to him.  -Will get another PSA in September 2024. He will see provider in office at that time. He would prefer [phone] since he lives closer. At that point if PSA still undetectable, he can consider transitioning to yearly PSA since he would be 5 years out from salvage XRT. -He knows that if he becomes interested in future ED treatment, he can reach out to the office. Portions of the above record have been created with voice recognition software. Occasional wrong word or "sound alike" substitution may have occurred due to the inherent limitations of voice recognition software. Read the chart carefully and recognize, using context, where substitution may have occurred.       Yamilet Padilla DO        Chief Complaint     Prostate cancer      History of Present Illness     See summary above    No fevers or chills        The following portions of the patient's history were reviewed and updated as appropriate: allergies, current medications, past family history, past medical history, past social history, past surgical history and problem list.        AUA SYMPTOM SCORE      Flowsheet Row Most Recent Value   AUA SYMPTOM SCORE    How often have you had a sensation of not emptying your bladder completely after you finished urinating? 0 (P)    How often have you had to urinate again less than two hours after you finished urinating? 0 (P)    How often have you found you stopped and started again several times when you urinate? 0 (P)    How often have you found it difficult to postpone urination? 0 (P)    How often have you had a weak urinary stream? 0 (P)    How often have you had to push or strain to begin urination? 0 (P)    How many times did you most typically get up to urinate from the time you went to bed at night until the time you got up in the morning? 0 (P)    Quality of Life: If you were to spend the rest of your life with your urinary condition just the way it is now, how would you feel about that? 2 (P)    AUA SYMPTOM SCORE 0 (P)               Review of Systems     Review of Systems   Constitutional:  Negative for chills and fever. Respiratory:  Negative for cough and shortness of breath. Gastrointestinal:  Negative for abdominal pain. Genitourinary:  Negative for dysuria and hematuria. Neurological:  Negative for dizziness and headaches. Psychiatric/Behavioral:  Negative for agitation and behavioral problems. Allergies     Allergies   Allergen Reactions    Molds & Smuts      Other reaction(s): Respiratory Distress  Congestion, sore throat, coughing. seasonal    Other      Other reaction(s): Respiratory Distress  Congestion, sore throat, coughing. Physical Exam     Physical Exam  Constitutional:       General: He is not in acute distress. HENT:      Head: Normocephalic and atraumatic. Pulmonary:      Effort: Pulmonary effort is normal. No respiratory distress. Abdominal:      General: Abdomen is flat. Palpations: Abdomen is soft. Tenderness: There is no right CVA tenderness or left CVA tenderness. Skin:     General: Skin is warm and dry. Neurological:      General: No focal deficit present. Mental Status: He is alert and oriented to person, place, and time.    Psychiatric:         Mood and Affect: Mood normal.         Behavior: Behavior normal.             Vital Signs  Vitals:    12/13/23 0755   BP: 142/78   BP Location: Left arm   Patient Position: Sitting   Cuff Size: Standard   Pulse: 77   SpO2: 99%   Weight: 82.9 kg (182 lb 12.8 oz) Current Medications       Current Outpatient Medications:     calcium citrate-vitamin D (CITRACAL+D) 315-200 MG-UNIT per tablet, Take by mouth 2 (two) times a day  , Disp: , Rfl:     fexofenadine (ALLEGRA) 180 MG tablet, Take 1 tablet by mouth daily, Disp: , Rfl:     levETIRAcetam (KEPPRA) 1000 MG tablet, TAKE 1 TABLET BY MOUTH  TWICE DAILY, Disp: , Rfl:     lisinopril (ZESTRIL) 5 mg tablet, TAKE 1 TABLET BY MOUTH  DAILY, Disp: , Rfl:     Multiple Vitamin (MULTIVITAMIN) tablet, Take 1 tablet by mouth daily, Disp: , Rfl:     simvastatin (ZOCOR) 40 mg tablet, Take 40 mg by mouth, Disp: , Rfl:     clotrimazole-betamethasone (LOTRISONE) 1-0.05 % cream, Apply 1 application topically 2 (two) times a day  (Patient not taking: Reported on 12/13/2023), Disp: , Rfl:     lisinopril (ZESTRIL) 5 mg tablet, Take 1 tablet by mouth daily (Patient not taking: Reported on 12/13/2023), Disp: , Rfl:     Melatonin 5 MG TABS, Take 5 mg by mouth daily at bedtime  , Disp: , Rfl:       Active Problems     Patient Active Problem List   Diagnosis    Prostate cancer ([address])    Tinea unguium    Contusion of left great toe with damage to nail         Past Medical History     Past Medical History:   Diagnosis Date    Basal cell carcinoma     Basal cell carcinoma     Hyperlipidemia     Hypertension     Hypertension     last assesed 12-22-11    PONV (postoperative nausea and vomiting)     Prostate cancer ([address])     Seizures ([address])     simple complex partial seizure--last seizure in 2004    Urinary incontinence          Surgical History     Past Surgical History:   Procedure Laterality Date    COLONOSCOPY      INGUINAL HERNIA REPAIR      MD LAPS SURG [id number] RPBIC RAD W/NRV SPARING ROBOT N/A 05/23/2018    Procedure: Idania Church W ROBOTICS;  Surgeon: Cecilia Nunes MD;  Location: [facility];  Service: Urology    PROSTATE BIOPSY  02/01/2018    Dr. Codi Sandoval  05/2018         Family History     Family History   Problem Relation Age of Onset    No Known Problems Mother     Cancer Father     Cancer Brother     Cancer Sister          Social History     Social History     Social History     Tobacco Use   Smoking Status Never   Smokeless Tobacco Never       Pertinent Lab Values     Lab Results   Component Value Date    CREATININE 0.97 05/24/2018       No results found for: "PSA"    PSA <0.01 on 9/19/23        Pertinent Imaging     N/A    Pertinent Pathology     N/A      I have spent 30 minutes with Patient  today in which greater than 50% of this time was spent in counseling/coordination of care regarding Diagnostic results, Prognosis, Risks and benefits of tx options, Instructions for management, Patient and family education, Importance of tx compliance, Impressions, Counseling / Coordination of care, Documenting in the medical record, Reviewing / ordering tests, medicine, procedures  , and Obtaining or reviewing history  . Please note this time includes cumulative time on the day of encounter, including reviewing medical records and/or coordinating care among the patient's other specialists.

## 2024-02-01 ENCOUNTER — TELEPHONE (OUTPATIENT)
Dept: UROLOGY | Facility: AMBULATORY SURGERY CENTER | Age: 74
End: 2024-02-01

## 2024-02-01 NOTE — TELEPHONE ENCOUNTER
Pt shows up on recall list for needing an appt in September with MD and I notice he has an AP appointment scheduled in September already... looks like he called in to schedule the AP appointment.   Can pt be removed from recall list or does he need an MD appt? Has seen FT in 2021 and Aps since then.   Thank you.

## 2024-03-12 LAB — SL AMB PSA, TOTAL: <0.01 NG/ML

## 2024-06-24 DIAGNOSIS — Z00.6 ENCOUNTER FOR EXAMINATION FOR NORMAL COMPARISON OR CONTROL IN CLINICAL RESEARCH PROGRAM: ICD-10-CM

## 2024-06-26 ENCOUNTER — APPOINTMENT (OUTPATIENT)
Dept: LAB | Facility: CLINIC | Age: 74
End: 2024-06-26
Payer: MEDICARE

## 2024-06-26 DIAGNOSIS — Z85.46 H/O PROSTATE CANCER: ICD-10-CM

## 2024-06-26 DIAGNOSIS — C61 PROSTATE CANCER (HCC): ICD-10-CM

## 2024-06-26 DIAGNOSIS — Z00.6 ENCOUNTER FOR EXAMINATION FOR NORMAL COMPARISON OR CONTROL IN CLINICAL RESEARCH PROGRAM: ICD-10-CM

## 2024-06-26 PROCEDURE — 36415 COLL VENOUS BLD VENIPUNCTURE: CPT

## 2024-07-06 LAB
APOB+LDLR+PCSK9 GENE MUT ANL BLD/T: NOT DETECTED
BRCA1+BRCA2 DEL+DUP + FULL MUT ANL BLD/T: NOT DETECTED
MLH1+MSH2+MSH6+PMS2 GN DEL+DUP+FUL M: NOT DETECTED

## 2024-09-19 RX ORDER — HYDROXYZINE HYDROCHLORIDE 25 MG/1
25 TABLET, FILM COATED ORAL DAILY PRN
COMMUNITY
Start: 2024-06-25

## 2024-09-19 RX ORDER — HYDROXYZINE HYDROCHLORIDE 10 MG/1
10 TABLET, FILM COATED ORAL DAILY PRN
COMMUNITY
Start: 2024-06-25 | End: 2024-10-23

## 2024-09-19 RX ORDER — FLUOROURACIL 50 MG/G
1 CREAM TOPICAL DAILY
COMMUNITY
Start: 2024-05-13

## 2024-09-24 ENCOUNTER — OFFICE VISIT (OUTPATIENT)
Dept: UROLOGY | Facility: AMBULATORY SURGERY CENTER | Age: 74
End: 2024-09-24
Payer: MEDICARE

## 2024-09-24 VITALS
WEIGHT: 184 LBS | OXYGEN SATURATION: 97 % | HEIGHT: 72 IN | DIASTOLIC BLOOD PRESSURE: 60 MMHG | SYSTOLIC BLOOD PRESSURE: 120 MMHG | HEART RATE: 69 BPM | BODY MASS INDEX: 24.92 KG/M2

## 2024-09-24 DIAGNOSIS — C61 PROSTATE CANCER (HCC): Primary | ICD-10-CM

## 2024-09-24 PROCEDURE — 99213 OFFICE O/P EST LOW 20 MIN: CPT

## 2024-09-24 NOTE — PROGRESS NOTES
Office Visit- Urology  Fredy Bragg 1950 MRN: [id number]      Assessment/Discussion/Plan    74 y.o. male managed by     Prostate cancer  -S/P RALP in 2018  -Final pathology with [place] 4+3 equal 7 prostate cancer with negative extraprostatic extension, urinary bladder invasion, seminal vesicle invasion, negative margins, involved nodes  -Biochemical recurrence in 2019.  He underwent salvage XRT completed in June 2019    -PSA remains undetectable measuring less than 0.01 including most updated PSA of 9/10/2024 as well.    2. ED  -Failed oral therapy not interested in any further intervention        Chief Complaint:   Fredy is a 74 y.o. male presenting to the office for a follow up visit regarding prostate cancer        Subjective    Patient is a 74-year-old male with a history of prostate cancer status post robot-assisted laparoscopic prostatectomy in 2018.  He was last seen in the office by Dr. Horn in December 2023.  Final pathology from RALP  returned with [place] 4+3 equal 7 prostate cancer with negative margins, extraprostatic extension, urinary bladder invasion, seminal vesicle invasion or involved sarita disease.  Patient had biochemical recurrence in 2019 underwent salvage radiation therapy completed in June 2019.  PSA since that point in time of been undetectable and remains of the type of last measurement of  less than 0.01 on 9/10/2024.  Patient denies any bothersome urinary tract symptoms this point in time.  No dysuria or gross hematuria      AUA SYMPTOM SCORE      Flowsheet Row Most Recent Value   AUA SYMPTOM SCORE    How often have you had a sensation of not emptying your bladder completely after you finished urinating? 0 (P)     How often have you had to urinate again less than two hours after you finished urinating? 0 (P)     How often have you found you stopped and started again several times when you urinate? 0 (P)     How often have you found it difficult to postpone urination? 0 (P)      How often have you had a weak urinary stream? 0 (P)     How often have you had to push or strain to begin urination? 0 (P)     How many times did you most typically get up to urinate from the time you went to bed at night until the time you got up in the morning? 0 (P)     Quality of Life: If you were to spend the rest of your life with your urinary condition just the way it is now, how would you feel about that? 0 (P)     AUA SYMPTOM SCORE 0 (P)              ROS:   Review of Systems   Constitutional: Negative.  Negative for chills, fatigue and fever.   HENT: Negative.     Respiratory:  Negative for shortness of breath.    Cardiovascular:  Negative for chest pain.   Gastrointestinal: Negative.  Negative for abdominal pain.   Endocrine: Negative.    Musculoskeletal: Negative.    Skin: Negative.    Neurological: Negative.  Negative for dizziness and light-headedness.   Hematological: Negative.    Psychiatric/Behavioral: Negative.           Past Medical History  Past Medical History:   Diagnosis Date    Basal cell carcinoma     Basal cell carcinoma     Hyperlipidemia     Hypertension     Hypertension     last assesed 12-22-11    PONV (postoperative nausea and vomiting)     Prostate cancer (HCC)     Seizures (HCC)     simple complex partial seizure--last seizure in 2004    Urinary incontinence        Past Surgical History  Past Surgical History:   Procedure Laterality Date    COLONOSCOPY      INGUINAL HERNIA REPAIR      IL LAPS SURG [id number] RPBIC RAD W/NRV SPARING ROBOT N/A 05/23/2018    Procedure: PROSTATECTOMY RADICAL W ROBOTICS;  Surgeon: Juancarlos Krueger MD;  Location: [facility];  Service: Urology    PROSTATE BIOPSY  02/01/2018    Dr. Krueger     PROSTATE SURGERY      PROSTATECTOMY  05/2018       Past Family History  Family History   Problem Relation Age of Onset    No Known Problems Mother     Cancer Father     Cancer Brother     Cancer Sister        Past Social history  Social History     Socioeconomic History     Marital status: /Civil Union     Spouse name: Not on file    Number of children: Not on file    Years of education: Not on file    Highest education level: Not on file   Occupational History    Not on file   Tobacco Use    Smoking status: Never    Smokeless tobacco: Never   Vaping Use    Vaping status: Never Used   Substance and Sexual Activity    Alcohol use: No    Drug use: No    Sexual activity: Not on file   Other Topics Concern    Not on file   Social History Narrative    Not on file     Social Determinants of Health     Financial Resource Strain: Low Risk  (1/10/2024)    Received from [facility]    Overall Financial Resource Strain (CARDIA)     Difficulty of Paying Living Expenses: Not hard at all   Food Insecurity: No Food Insecurity (1/10/2024)    Received from [facility]    Hunger Vital Sign     Worried About Running Out of Food in the Last Year: Never true     Ran Out of Food in the Last Year: Never true   Transportation Needs: No Transportation Needs (1/10/2024)    Received from [facility]    PRAPARE - Transportation     Lack of Transportation (Medical): No     Lack of Transportation (Non-Medical): No   Physical Activity: Not on file   Stress: Stress Concern Present (1/10/2024)    Received from [facility]    [employer] - Occupational Stress Questionnaire     Feeling of Stress : To some extent   Social Connections: Feeling Socially Integrated (1/10/2024)    Received from [facility]    OASIS : Social Isolation     How often do you feel lonely or isolated from those around you?: Never   Intimate Partner Violence: Not At Risk (1/10/2024)    Received from [facility]    Humiliation, Afraid, Rape, and Kick questionnaire     Fear of Current or Ex-Partner: No     Emotionally Abused: No     Physically Abused: No     Sexually Abused: No   Housing Stability: Not on file        Current Medications  Current Outpatient Medications   Medication Sig Dispense Refill    calcium citrate-vitamin D (CITRACAL+D) 315-200 MG-UNIT per tablet Take by mouth 2 (two) times a day        fexofenadine (ALLEGRA) 180 MG tablet Take 1 tablet by mouth daily      fluorouracil (EFUDEX) 5 % cream Apply 1 Application topically daily      hydrOXYzine HCL (ATARAX) 10 mg tablet Take 10 mg by mouth daily as needed      hydrOXYzine HCL (ATARAX) 25 mg tablet Take 25 mg by mouth daily as needed      levETIRAcetam (KEPPRA) 1000 MG tablet TAKE 1 TABLET BY MOUTH  TWICE DAILY      Multiple Vitamin (MULTIVITAMIN) tablet Take 1 tablet by mouth daily      simvastatin (ZOCOR) 40 mg tablet Take 40 mg by mouth      clotrimazole-betamethasone (LOTRISONE) 1-0.05 % cream Apply 1 application topically 2 (two) times a day  (Patient not taking: Reported on 12/13/2023)      lisinopril (ZESTRIL) 5 mg tablet Take 1 tablet by mouth daily (Patient not taking: Reported on 12/13/2023)      Melatonin 5 MG TABS Take 5 mg by mouth daily at bedtime   (Patient not taking: Reported on 9/24/2024)       No current facility-administered medications for this visit.       Allergies  Allergies   Allergen Reactions    Molds & Smuts      Other reaction(s): Respiratory Distress  Congestion, sore throat, coughing. seasonal    Other      Other reaction(s): Respiratory Distress  Congestion, sore throat, coughing.        OBJECTIVE    Vitals   Vitals:    09/24/24 0739   BP: 120/60   BP Location: Left arm   Patient Position: Sitting   Cuff Size: Standard   Pulse: 69   SpO2: 97%   Weight: 83.5 kg (184 lb)   Height: 6' (1.829 m)       PVR:    Physical Exam  Constitutional:       General: He is not in acute distress.     Appearance: Normal appearance. He is normal weight. He is not ill-appearing or toxic-appearing.   HENT:      Head: Normocephalic and atraumatic.   Eyes:      Conjunctiva/sclera: Conjunctivae normal.   Cardiovascular:      Rate and Rhythm: Normal  "rate.   Pulmonary:      Effort: Pulmonary effort is normal. No respiratory distress.   Skin:     General: Skin is warm and dry.   Neurological:      General: No focal deficit present.      Mental Status: He is alert and oriented to person, place, and time.      Cranial Nerves: No cranial nerve deficit.   Psychiatric:         Mood and Affect: Mood normal.         Behavior: Behavior normal.         Thought Content: Thought content normal.          Labs:     Lab Results   Component Value Date    PSA <0.01 03/12/2024     Lab Results   Component Value Date    CREATININE 0.92 01/03/2024      No results found for: \"HGBA1C\"  Lab Results   Component Value Date    GLUCOSE 96 02/19/2015    CALCIUM 9.1 01/03/2024     02/19/2015    K 4.4 01/03/2024    CO2 28 01/03/2024     01/03/2024    BUN 21 01/03/2024    CREATININE 0.92 01/03/2024       I have personally reviewed all pertinent lab results and reviewed with patient    Imaging       Rich Shelton PA-C  Date: 9/24/2024 Time: 7:43 AM  [facility]    This note was written using fluency dictation software. Please excuse any resulting minor grammatical errors.      "

## (undated) DEVICE — MONOPOLAR CURVED SCISSORS: Brand: ENDOWRIST

## (undated) DEVICE — LUBRICANT SURGILUBE TUBE 4 OZ  FLIP TOP

## (undated) DEVICE — COLUMN DRAPE

## (undated) DEVICE — CLAMP TOWEL TUBING DISPOSABLE

## (undated) DEVICE — INTENDED FOR TISSUE SEPARATION, AND OTHER PROCEDURES THAT REQUIRE A SHARP SURGICAL BLADE TO PUNCTURE OR CUT.: Brand: BARD-PARKER SAFETY BLADES SIZE 15, STERILE

## (undated) DEVICE — SUT ETHILON 3-0 FS-1 18 IN 663G

## (undated) DEVICE — ENDOPATH PNEUMONEEDLE INSUFFLATION NEEDLES WITH LUER LOCK CONNECTORS 120MM: Brand: ENDOPATH

## (undated) DEVICE — ENDOPOUCH RETRIEVER SPECIMEN RETRIEVAL BAGS: Brand: ENDOPOUCH RETRIEVER

## (undated) DEVICE — INTENDED FOR TISSUE SEPARATION, AND OTHER PROCEDURES THAT REQUIRE A SHARP SURGICAL BLADE TO PUNCTURE OR CUT.: Brand: BARD-PARKER SAFETY BLADES SIZE 11, STERILE

## (undated) DEVICE — ANTIBACTERIAL VIOLET BRAIDED (POLYGLACTIN 910), SYNTHETIC ABSORBABLE SUTURE: Brand: COATED VICRYL

## (undated) DEVICE — GLOVE SRG BIOGEL ORTHOPEDIC 7.5

## (undated) DEVICE — ENDOPATH XCEL BLADELESS TROCARS WITH STABILITY SLEEVES: Brand: ENDOPATH XCEL

## (undated) DEVICE — URIMETER 2500ML

## (undated) DEVICE — VESSEL SEALER: Brand: ENDOWRIST

## (undated) DEVICE — ADHESIVE SKN CLSR HISTOACRYL FLEX 0.5ML LF

## (undated) DEVICE — KERLIX BANDAGE ROLL: Brand: KERLIX

## (undated) DEVICE — 3000CC GUARDIAN II: Brand: GUARDIAN

## (undated) DEVICE — 3M™ DURAPORE™ SURGICAL TAPE 2 INCHES X 10YARDS (5.0CM X 9.1M) 6ROLLS/CARTON 10CARTONS/CASE 1538-2: Brand: 3M™ DURAPORE™

## (undated) DEVICE — SUT MONOCRYL 4-0 PS-2 27 IN Y426H

## (undated) DEVICE — SPONGE STICK WITH PVP-I: Brand: KENDALL

## (undated) DEVICE — CATH FOLEY 18FR 5ML 2 WAY SILICONE ELASTIMER

## (undated) DEVICE — 3M™ TEGADERM™ TRANSPARENT FILM DRESSING FRAME STYLE, 1628, 6 IN X 8 IN (15 CM X 20 CM), 10/CT 8CT/CASE: Brand: 3M™ TEGADERM™

## (undated) DEVICE — ASTOUND STANDARD SURGICAL GOWN, XL: Brand: CONVERTORS

## (undated) DEVICE — PACK ROBOTIC PROSTATE PBDS DA VINCI SI/XI

## (undated) DEVICE — PROGRASP FORCEPS: Brand: ENDOWRIST

## (undated) DEVICE — LARGE NEEDLE DRIVER: Brand: ENDOWRIST

## (undated) DEVICE — JACKSON-PRATT 100CC BULB RESERVOIR: Brand: CARDINAL HEALTH

## (undated) DEVICE — SUT MONOCRYL 3-0 RB-1 27 IN Y215H

## (undated) DEVICE — LUBRICANT INST ELECTROLUBE ANTISTK WO PAD

## (undated) DEVICE — CANNULA SEAL

## (undated) DEVICE — FENESTRATED BIPOLAR FORCEPS: Brand: ENDOWRIST

## (undated) DEVICE — POSITIONER EGGCRATE

## (undated) DEVICE — CATH FOLEY COUNCIL 20FR 5ML 2 WAY LUBRICATH

## (undated) DEVICE — ARM DRAPE

## (undated) DEVICE — STRL COTTON TIP APPLCTR 6IN PK: Brand: CARDINAL HEALTH

## (undated) DEVICE — JP PERF DRN SIL FLT 10MM FULL: Brand: CARDINAL HEALTH

## (undated) DEVICE — CHLORAPREP HI-LITE 26ML ORANGE

## (undated) DEVICE — SUT MONOCRYL 3-0 RB-1 27 IN Y305H

## (undated) DEVICE — TIP COVER ACCESSORY

## (undated) DEVICE — GAUZE SPONGES,USP TYPE VII GAUZE, 12 PLY: Brand: CURITY

## (undated) DEVICE — SUT PDS II 0 CT-1 27 IN Z340H